# Patient Record
Sex: MALE | Race: WHITE | Employment: FULL TIME | URBAN - METROPOLITAN AREA
[De-identification: names, ages, dates, MRNs, and addresses within clinical notes are randomized per-mention and may not be internally consistent; named-entity substitution may affect disease eponyms.]

---

## 2017-01-27 RX ORDER — TRAZODONE HYDROCHLORIDE 100 MG/1
TABLET ORAL
Qty: 270 TABLET | Refills: 1 | Status: SHIPPED | OUTPATIENT
Start: 2017-01-27 | End: 2017-02-16 | Stop reason: SDUPTHER

## 2017-01-27 RX ORDER — ATORVASTATIN CALCIUM 80 MG/1
TABLET, FILM COATED ORAL
Qty: 90 TABLET | Refills: 1 | Status: SHIPPED | OUTPATIENT
Start: 2017-01-27 | End: 2017-02-16 | Stop reason: SDUPTHER

## 2017-01-27 RX ORDER — AMLODIPINE BESYLATE 10 MG/1
TABLET ORAL
Qty: 90 TABLET | Refills: 1 | Status: SHIPPED | OUTPATIENT
Start: 2017-01-27 | End: 2017-08-11 | Stop reason: SDUPTHER

## 2017-01-27 RX ORDER — VALSARTAN 320 MG/1
TABLET ORAL
Qty: 90 TABLET | Refills: 1 | Status: SHIPPED | OUTPATIENT
Start: 2017-01-27 | End: 2017-08-11 | Stop reason: SDUPTHER

## 2017-01-27 RX ORDER — POTASSIUM CHLORIDE 750 MG/1
TABLET, FILM COATED, EXTENDED RELEASE ORAL
Qty: 90 TABLET | Refills: 1 | Status: SHIPPED | OUTPATIENT
Start: 2017-01-27 | End: 2017-08-15 | Stop reason: SDUPTHER

## 2017-02-07 RX ORDER — OXYBUTYNIN CHLORIDE 10 MG/1
10 TABLET, EXTENDED RELEASE ORAL DAILY
Qty: 90 TABLET | Refills: 1 | Status: SHIPPED | OUTPATIENT
Start: 2017-02-07 | End: 2017-09-25 | Stop reason: SDUPTHER

## 2017-02-10 ENCOUNTER — TELEPHONE (OUTPATIENT)
Dept: FAMILY MEDICINE CLINIC | Age: 67
End: 2017-02-10

## 2017-02-10 RX ORDER — HYDROCHLOROTHIAZIDE 25 MG/1
25 TABLET ORAL DAILY
Qty: 30 TABLET | Refills: 0 | Status: SHIPPED | OUTPATIENT
Start: 2017-02-10 | End: 2017-03-28 | Stop reason: SDUPTHER

## 2017-02-16 ENCOUNTER — OFFICE VISIT (OUTPATIENT)
Dept: FAMILY MEDICINE CLINIC | Age: 67
End: 2017-02-16

## 2017-02-16 VITALS
OXYGEN SATURATION: 95 % | BODY MASS INDEX: 32.39 KG/M2 | WEIGHT: 266 LBS | SYSTOLIC BLOOD PRESSURE: 134 MMHG | DIASTOLIC BLOOD PRESSURE: 70 MMHG | RESPIRATION RATE: 17 BRPM | HEIGHT: 76 IN | HEART RATE: 83 BPM | TEMPERATURE: 97.7 F

## 2017-02-16 DIAGNOSIS — J40 BRONCHITIS: Primary | ICD-10-CM

## 2017-02-16 PROCEDURE — 1036F TOBACCO NON-USER: CPT | Performed by: NURSE PRACTITIONER

## 2017-02-16 PROCEDURE — G8427 DOCREV CUR MEDS BY ELIG CLIN: HCPCS | Performed by: NURSE PRACTITIONER

## 2017-02-16 PROCEDURE — G8417 CALC BMI ABV UP PARAM F/U: HCPCS | Performed by: NURSE PRACTITIONER

## 2017-02-16 PROCEDURE — 1123F ACP DISCUSS/DSCN MKR DOCD: CPT | Performed by: NURSE PRACTITIONER

## 2017-02-16 PROCEDURE — G8484 FLU IMMUNIZE NO ADMIN: HCPCS | Performed by: NURSE PRACTITIONER

## 2017-02-16 PROCEDURE — 3017F COLORECTAL CA SCREEN DOC REV: CPT | Performed by: NURSE PRACTITIONER

## 2017-02-16 PROCEDURE — 4040F PNEUMOC VAC/ADMIN/RCVD: CPT | Performed by: NURSE PRACTITIONER

## 2017-02-16 PROCEDURE — 99213 OFFICE O/P EST LOW 20 MIN: CPT | Performed by: NURSE PRACTITIONER

## 2017-02-16 RX ORDER — GUAIFENESIN AND CODEINE PHOSPHATE 100; 10 MG/5ML; MG/5ML
5-10 SOLUTION ORAL EVERY 4 HOURS PRN
Qty: 120 ML | Refills: 0 | Status: SHIPPED | OUTPATIENT
Start: 2017-02-16 | End: 2017-02-23

## 2017-02-16 RX ORDER — AZITHROMYCIN 250 MG/1
TABLET, FILM COATED ORAL
Qty: 6 TABLET | Refills: 0 | Status: SHIPPED | OUTPATIENT
Start: 2017-02-16 | End: 2017-02-26

## 2017-03-03 RX ORDER — FUROSEMIDE 40 MG/1
TABLET ORAL
Qty: 30 TABLET | Refills: 2 | Status: SHIPPED | OUTPATIENT
Start: 2017-03-03 | End: 2017-08-15 | Stop reason: SDUPTHER

## 2017-03-13 ENCOUNTER — PATIENT MESSAGE (OUTPATIENT)
Dept: FAMILY MEDICINE CLINIC | Age: 67
End: 2017-03-13

## 2017-03-28 RX ORDER — HYDROCHLOROTHIAZIDE 25 MG/1
TABLET ORAL
Qty: 30 TABLET | Refills: 0 | Status: SHIPPED | OUTPATIENT
Start: 2017-03-28 | End: 2017-04-24 | Stop reason: SDUPTHER

## 2017-04-24 RX ORDER — DULOXETIN HYDROCHLORIDE 60 MG/1
CAPSULE, DELAYED RELEASE ORAL
Qty: 30 CAPSULE | Refills: 2 | Status: SHIPPED | OUTPATIENT
Start: 2017-04-24 | End: 2017-07-18 | Stop reason: SDUPTHER

## 2017-04-24 RX ORDER — HYDROCHLOROTHIAZIDE 25 MG/1
TABLET ORAL
Qty: 30 TABLET | Refills: 0 | Status: SHIPPED | OUTPATIENT
Start: 2017-04-24 | End: 2017-05-26 | Stop reason: SDUPTHER

## 2017-04-27 RX ORDER — GABAPENTIN 800 MG/1
TABLET ORAL
Qty: 90 TABLET | Refills: 1 | Status: SHIPPED | OUTPATIENT
Start: 2017-04-27 | End: 2017-06-25 | Stop reason: SDUPTHER

## 2017-05-23 RX ORDER — FUROSEMIDE 40 MG/1
TABLET ORAL
Qty: 30 TABLET | Refills: 2 | Status: SHIPPED | OUTPATIENT
Start: 2017-05-23 | End: 2017-07-18 | Stop reason: SDUPTHER

## 2017-05-23 RX ORDER — OMEPRAZOLE 20 MG/1
CAPSULE, DELAYED RELEASE ORAL
Qty: 30 CAPSULE | Refills: 2 | Status: SHIPPED | OUTPATIENT
Start: 2017-05-23 | End: 2017-07-18 | Stop reason: SDUPTHER

## 2017-05-26 RX ORDER — HYDROCHLOROTHIAZIDE 25 MG/1
TABLET ORAL
Qty: 30 TABLET | Refills: 0 | Status: SHIPPED | OUTPATIENT
Start: 2017-05-26 | End: 2017-06-26 | Stop reason: SDUPTHER

## 2017-06-26 RX ORDER — GABAPENTIN 800 MG/1
TABLET ORAL
Qty: 90 TABLET | Refills: 1 | Status: SHIPPED | OUTPATIENT
Start: 2017-06-26 | End: 2017-10-13 | Stop reason: SDUPTHER

## 2017-07-18 RX ORDER — OMEPRAZOLE 20 MG/1
CAPSULE, DELAYED RELEASE ORAL
Qty: 30 CAPSULE | Refills: 2 | Status: SHIPPED | OUTPATIENT
Start: 2017-07-18 | End: 2017-08-15 | Stop reason: SDUPTHER

## 2017-07-18 RX ORDER — DULOXETIN HYDROCHLORIDE 60 MG/1
CAPSULE, DELAYED RELEASE ORAL
Qty: 30 CAPSULE | Refills: 2 | Status: SHIPPED | OUTPATIENT
Start: 2017-07-18 | End: 2017-08-15 | Stop reason: SDUPTHER

## 2017-07-18 RX ORDER — FUROSEMIDE 40 MG/1
TABLET ORAL
Qty: 90 TABLET | Refills: 1 | Status: CANCELLED | OUTPATIENT
Start: 2017-07-18

## 2017-07-18 RX ORDER — OMEPRAZOLE 20 MG/1
CAPSULE, DELAYED RELEASE ORAL
Qty: 90 CAPSULE | Refills: 1 | Status: CANCELLED | OUTPATIENT
Start: 2017-07-18

## 2017-07-18 RX ORDER — DULOXETIN HYDROCHLORIDE 60 MG/1
CAPSULE, DELAYED RELEASE ORAL
Qty: 90 CAPSULE | Refills: 1 | Status: CANCELLED | OUTPATIENT
Start: 2017-07-18

## 2017-07-18 RX ORDER — FUROSEMIDE 40 MG/1
40 TABLET ORAL DAILY
Qty: 30 TABLET | Refills: 2 | Status: SHIPPED | OUTPATIENT
Start: 2017-07-18 | End: 2017-08-15 | Stop reason: SDUPTHER

## 2017-08-22 RX ORDER — FINASTERIDE 5 MG/1
TABLET, FILM COATED ORAL
Qty: 30 TABLET | Refills: 8 | Status: SHIPPED | OUTPATIENT
Start: 2017-08-22 | End: 2018-05-14 | Stop reason: SDUPTHER

## 2017-09-13 RX ORDER — TAMSULOSIN HYDROCHLORIDE 0.4 MG/1
CAPSULE ORAL
Qty: 30 CAPSULE | Refills: 0 | Status: SHIPPED | OUTPATIENT
Start: 2017-09-13 | End: 2017-10-20 | Stop reason: SDUPTHER

## 2017-09-13 RX ORDER — DULOXETIN HYDROCHLORIDE 60 MG/1
CAPSULE, DELAYED RELEASE ORAL
Qty: 30 CAPSULE | Refills: 0 | Status: SHIPPED | OUTPATIENT
Start: 2017-09-13 | End: 2017-10-15 | Stop reason: SDUPTHER

## 2017-09-14 RX ORDER — AMLODIPINE BESYLATE 10 MG/1
TABLET ORAL
Qty: 30 TABLET | Refills: 0 | Status: SHIPPED | OUTPATIENT
Start: 2017-09-14 | End: 2017-09-20 | Stop reason: SDUPTHER

## 2017-09-14 RX ORDER — ATORVASTATIN CALCIUM 80 MG/1
TABLET, FILM COATED ORAL
Qty: 30 TABLET | Refills: 0 | Status: SHIPPED | OUTPATIENT
Start: 2017-09-14 | End: 2017-09-20 | Stop reason: SDUPTHER

## 2017-09-14 RX ORDER — OMEPRAZOLE 20 MG/1
CAPSULE, DELAYED RELEASE ORAL
Qty: 30 CAPSULE | Refills: 0 | Status: SHIPPED | OUTPATIENT
Start: 2017-09-14 | End: 2017-10-15 | Stop reason: SDUPTHER

## 2017-09-14 RX ORDER — POTASSIUM CHLORIDE 750 MG/1
TABLET, FILM COATED, EXTENDED RELEASE ORAL
Qty: 30 TABLET | Refills: 0 | Status: SHIPPED | OUTPATIENT
Start: 2017-09-14 | End: 2017-09-20 | Stop reason: SDUPTHER

## 2017-09-14 RX ORDER — VALSARTAN 320 MG/1
TABLET ORAL
Qty: 30 TABLET | Refills: 0 | Status: SHIPPED | OUTPATIENT
Start: 2017-09-14 | End: 2017-09-20 | Stop reason: SDUPTHER

## 2017-09-14 RX ORDER — FUROSEMIDE 40 MG/1
TABLET ORAL
Qty: 30 TABLET | Refills: 0 | Status: SHIPPED | OUTPATIENT
Start: 2017-09-14 | End: 2017-10-15 | Stop reason: SDUPTHER

## 2017-09-20 RX ORDER — ATORVASTATIN CALCIUM 80 MG/1
TABLET, FILM COATED ORAL
Qty: 30 TABLET | Refills: 0 | Status: SHIPPED | OUTPATIENT
Start: 2017-09-20 | End: 2018-10-16 | Stop reason: SDUPTHER

## 2017-09-20 RX ORDER — VALSARTAN 320 MG/1
TABLET ORAL
Qty: 30 TABLET | Refills: 0 | Status: SHIPPED | OUTPATIENT
Start: 2017-09-20 | End: 2018-08-23

## 2017-09-20 RX ORDER — POTASSIUM CHLORIDE 750 MG/1
TABLET, FILM COATED, EXTENDED RELEASE ORAL
Qty: 30 TABLET | Refills: 0 | Status: SHIPPED | OUTPATIENT
Start: 2017-09-20 | End: 2018-06-21 | Stop reason: SDUPTHER

## 2017-09-20 RX ORDER — AMLODIPINE BESYLATE 10 MG/1
TABLET ORAL
Qty: 30 TABLET | Refills: 0 | Status: SHIPPED | OUTPATIENT
Start: 2017-09-20 | End: 2017-11-22 | Stop reason: SDUPTHER

## 2017-09-25 RX ORDER — OXYBUTYNIN CHLORIDE 10 MG/1
TABLET, EXTENDED RELEASE ORAL
Qty: 90 TABLET | Refills: 0 | Status: SHIPPED | OUTPATIENT
Start: 2017-09-25 | End: 2018-01-02 | Stop reason: SDUPTHER

## 2017-10-12 ENCOUNTER — OFFICE VISIT (OUTPATIENT)
Dept: FAMILY MEDICINE CLINIC | Age: 67
End: 2017-10-12

## 2017-10-12 VITALS
BODY MASS INDEX: 32.76 KG/M2 | RESPIRATION RATE: 16 BRPM | DIASTOLIC BLOOD PRESSURE: 72 MMHG | SYSTOLIC BLOOD PRESSURE: 120 MMHG | HEIGHT: 76 IN | HEART RATE: 78 BPM | WEIGHT: 269 LBS

## 2017-10-12 DIAGNOSIS — I10 ESSENTIAL HYPERTENSION: Primary | ICD-10-CM

## 2017-10-12 DIAGNOSIS — F33.42 RECURRENT MAJOR DEPRESSIVE EPISODES, IN FULL REMISSION (HCC): ICD-10-CM

## 2017-10-12 DIAGNOSIS — E55.9 VITAMIN D DEFICIENCY: ICD-10-CM

## 2017-10-12 DIAGNOSIS — E78.00 HYPERCHOLESTEREMIA: ICD-10-CM

## 2017-10-12 DIAGNOSIS — E11.40 PAINFUL DIABETIC NEUROPATHY (HCC): ICD-10-CM

## 2017-10-12 DIAGNOSIS — K59.04 CHRONIC IDIOPATHIC CONSTIPATION: ICD-10-CM

## 2017-10-12 DIAGNOSIS — Z13.6 SCREENING FOR AAA (ABDOMINAL AORTIC ANEURYSM): ICD-10-CM

## 2017-10-12 DIAGNOSIS — N40.1 BENIGN PROSTATIC HYPERPLASIA WITH LOWER URINARY TRACT SYMPTOMS, UNSPECIFIED MORPHOLOGY: ICD-10-CM

## 2017-10-12 DIAGNOSIS — F51.01 PRIMARY INSOMNIA: ICD-10-CM

## 2017-10-12 PROCEDURE — 99214 OFFICE O/P EST MOD 30 MIN: CPT | Performed by: FAMILY MEDICINE

## 2017-10-12 PROCEDURE — G8484 FLU IMMUNIZE NO ADMIN: HCPCS | Performed by: FAMILY MEDICINE

## 2017-10-12 PROCEDURE — G8427 DOCREV CUR MEDS BY ELIG CLIN: HCPCS | Performed by: FAMILY MEDICINE

## 2017-10-12 PROCEDURE — 1123F ACP DISCUSS/DSCN MKR DOCD: CPT | Performed by: FAMILY MEDICINE

## 2017-10-12 PROCEDURE — 3046F HEMOGLOBIN A1C LEVEL >9.0%: CPT | Performed by: FAMILY MEDICINE

## 2017-10-12 PROCEDURE — G8510 SCR DEP NEG, NO PLAN REQD: HCPCS | Performed by: FAMILY MEDICINE

## 2017-10-12 PROCEDURE — 1036F TOBACCO NON-USER: CPT | Performed by: FAMILY MEDICINE

## 2017-10-12 PROCEDURE — 3288F FALL RISK ASSESSMENT DOCD: CPT | Performed by: FAMILY MEDICINE

## 2017-10-12 PROCEDURE — 3017F COLORECTAL CA SCREEN DOC REV: CPT | Performed by: FAMILY MEDICINE

## 2017-10-12 PROCEDURE — 4040F PNEUMOC VAC/ADMIN/RCVD: CPT | Performed by: FAMILY MEDICINE

## 2017-10-12 PROCEDURE — G8417 CALC BMI ABV UP PARAM F/U: HCPCS | Performed by: FAMILY MEDICINE

## 2017-10-12 RX ORDER — NEBIVOLOL 10 MG/1
10 TABLET ORAL DAILY
Qty: 30 TABLET | Refills: 3 | Status: SHIPPED | OUTPATIENT
Start: 2017-10-12 | End: 2018-01-16

## 2017-10-12 ASSESSMENT — PATIENT HEALTH QUESTIONNAIRE - PHQ9
SUM OF ALL RESPONSES TO PHQ QUESTIONS 1-9: 0
2. FEELING DOWN, DEPRESSED OR HOPELESS: 0
1. LITTLE INTEREST OR PLEASURE IN DOING THINGS: 0
SUM OF ALL RESPONSES TO PHQ9 QUESTIONS 1 & 2: 0

## 2017-10-12 NOTE — PROGRESS NOTES
Subjective:      Patient ID: Shivam Agosto is a 75 y.o. male. HPI  Chief Complaint   Patient presents with    Hypertension     HTN ROUTINE FOLLOW UP [facility] GOAL NEEDED FASTING LABS ORDERED FOR FUTURE WOULD LIKE TO DISCUSS TEST BECUASE HE WAS A FORMER SMOKE WANTS TO DISCUSS CHANGING AMLODIPINE     bp up off norvasc - 160'P systolic - went back on norvasc  BP Readings from Last 3 Encounters:   10/12/17 120/72   02/16/17 134/70   11/15/16 132/72     Pulse Readings from Last 3 Encounters:   10/12/17 78   02/16/17 83   11/15/16 94     Wt Readings from Last 3 Encounters:   10/12/17 269 lb (122 kg)   02/16/17 266 lb (120.7 kg)   11/15/16 266 lb (120.7 kg)   some constipation issues - takes occ stool softener - seems to work ok. Tries to get fiber / fluids  Current Outpatient Prescriptions   Medication Sig Dispense Refill    tiZANidine (ZANAFLEX) 4 MG tablet TAKE 2 TABLETS BY MOUTH EVERY DAY IN THE EVENING 60 tablet 1    oxybutynin (DITROPAN-XL) 10 MG extended release tablet TAKE 1 TABLET BY MOUTH DAILY 90 tablet 0    KLOR-CON 10 10 MEQ extended release tablet TAKE 1 TABLET BY MOUTH EVERY DAY 30 tablet 0    amLODIPine (NORVASC) 10 MG tablet TAKE 1 TABLET BY MOUTH EVERY DAY 30 tablet 0    atorvastatin (LIPITOR) 80 MG tablet TAKE 1 TABLET BY MOUTH EVERY DAY 30 tablet 0    valsartan (DIOVAN) 320 MG tablet TAKE 1 TABLET BY MOUTH EVERY DAY 30 tablet 0    omeprazole (PRILOSEC) 20 MG delayed release capsule TAKE ONE CAPSULE BY MOUTH EVERY DAY 30 capsule 0    furosemide (LASIX) 40 MG tablet TAKE 1 TABLET BY MOUTH DAILY APPT REQUIRED PRIOR TO ADDITIONAL REFILLS 30 tablet 0    DULoxetine (CYMBALTA) 60 MG extended release capsule TAKE 1 CAPSULE BY MOUTH DAILY 30 capsule 0    tamsulosin (FLOMAX) 0.4 MG capsule TAKE 1 CAPSULE BY MOUTH DAILY. 30 capsule 0    traZODone (DESYREL) 100 MG tablet TAKE 2 TO 3 TABLETS BY MOUTH NIGHTLY.  270 tablet 0    hydrochlorothiazide (HYDRODIURIL) 25 MG tablet TAKE 1 TABLET BY MOUTH DAILY 90 tablet 0    finasteride (PROSCAR) 5 MG tablet TAKE 1 TABLET BY MOUTH EVERY DAY 30 tablet 8    gabapentin (NEURONTIN) 800 MG tablet TAKE 1 TABLET BY MOUTH 3 TIMES A DAY 90 tablet 1    traZODone (DESYREL) 100 MG tablet TAKE 2-3 TABLETS BY MOUTH AT BEDTIME 90 tablet 1    chlorthalidone (HYGROTON) 25 MG tablet TAKE 1 TABLET BY MOUTH DAILY. 30 tablet 11    ibuprofen (ADVIL;MOTRIN) 200 MG tablet Take 200 mg by mouth every 6 hours as needed for Pain.  Pseudoephedrine-Guaifenesin (MUCINEX D PO) Take 1 tablet by mouth daily.  vitamin D-3 (CHOLECALCIFEROL) 5000 UNITS TABS Take 5,000 Units by mouth daily. Current Facility-Administered Medications   Medication Dose Route Frequency Provider Last Rate Last Dose    bupivacaine (MARCAINE) 0.5 % injection 7.5 mg  1.5 mL Intra-articular Once Arley Sans, CNP        lidocaine 1 % injection 1 mL  1 mL Intra-articular Once Arley Sans, CNP        methylPREDNISolone acetate (DEPO-MEDROL) injection 40 mg  40 mg Intra-articular Once Arley Sans, CNP       no ha/ dizzy  Trying to watch diet a little more  Not sleeping great - pills help get to sleep but up in 3-4 hours. Able to sleep again  Getting thompson test in near future  bph fairly good on flomax  Some apneic episodes  No cp/palp/sob  Review of Systems  Quit smoking 1973 -smoked about 7-8 years  Objective:   Physical Exam   Constitutional: He appears well-developed. No distress. HENT:   Mouth/Throat: Oropharynx is clear and moist.   Eyes: Conjunctivae are normal. No scleral icterus. Cardiovascular: Normal rate, regular rhythm, normal heart sounds and intact distal pulses. Exam reveals no gallop. No murmur heard. Pulmonary/Chest: Effort normal and breath sounds normal. No respiratory distress. He has no wheezes. He has no rhonchi. He has no rales. Abdominal: Soft. Bowel sounds are normal. He exhibits no distension. There is no tenderness. Musculoskeletal: He exhibits no edema.

## 2017-10-13 ENCOUNTER — NURSE ONLY (OUTPATIENT)
Dept: FAMILY MEDICINE CLINIC | Age: 67
End: 2017-10-13

## 2017-10-13 DIAGNOSIS — E55.9 VITAMIN D DEFICIENCY: ICD-10-CM

## 2017-10-13 DIAGNOSIS — E78.00 HYPERCHOLESTEREMIA: ICD-10-CM

## 2017-10-13 DIAGNOSIS — I10 ESSENTIAL HYPERTENSION: ICD-10-CM

## 2017-10-13 DIAGNOSIS — N40.1 BENIGN PROSTATIC HYPERPLASIA WITH LOWER URINARY TRACT SYMPTOMS, UNSPECIFIED MORPHOLOGY: ICD-10-CM

## 2017-10-13 LAB
A/G RATIO: 2.2 (ref 1.1–2.2)
ALBUMIN SERPL-MCNC: 4.4 G/DL (ref 3.4–5)
ALP BLD-CCNC: 78 U/L (ref 40–129)
ALT SERPL-CCNC: 22 U/L (ref 10–40)
ANION GAP SERPL CALCULATED.3IONS-SCNC: 15 MMOL/L (ref 3–16)
AST SERPL-CCNC: 16 U/L (ref 15–37)
BILIRUB SERPL-MCNC: 0.6 MG/DL (ref 0–1)
BUN BLDV-MCNC: 21 MG/DL (ref 7–20)
CALCIUM SERPL-MCNC: 9.2 MG/DL (ref 8.3–10.6)
CHLORIDE BLD-SCNC: 100 MMOL/L (ref 99–110)
CHOLESTEROL, TOTAL: 184 MG/DL (ref 0–199)
CO2: 26 MMOL/L (ref 21–32)
CREAT SERPL-MCNC: 0.8 MG/DL (ref 0.8–1.3)
GFR AFRICAN AMERICAN: >60
GFR NON-AFRICAN AMERICAN: >60
GLOBULIN: 2 G/DL
GLUCOSE BLD-MCNC: 108 MG/DL (ref 70–99)
HDLC SERPL-MCNC: 56 MG/DL (ref 40–60)
LDL CHOLESTEROL CALCULATED: 105 MG/DL
POTASSIUM SERPL-SCNC: 3.9 MMOL/L (ref 3.5–5.1)
PROSTATE SPECIFIC ANTIGEN: 1.48 NG/ML (ref 0–4)
SODIUM BLD-SCNC: 141 MMOL/L (ref 136–145)
TOTAL PROTEIN: 6.4 G/DL (ref 6.4–8.2)
TRIGL SERPL-MCNC: 115 MG/DL (ref 0–150)
VITAMIN D 25-HYDROXY: 58.3 NG/ML
VLDLC SERPL CALC-MCNC: 23 MG/DL

## 2017-10-13 PROCEDURE — 36415 COLL VENOUS BLD VENIPUNCTURE: CPT | Performed by: FAMILY MEDICINE

## 2017-10-16 RX ORDER — FUROSEMIDE 40 MG/1
TABLET ORAL
Qty: 30 TABLET | Refills: 0 | Status: SHIPPED | OUTPATIENT
Start: 2017-10-16 | End: 2017-11-22 | Stop reason: SDUPTHER

## 2017-10-16 RX ORDER — DULOXETIN HYDROCHLORIDE 60 MG/1
CAPSULE, DELAYED RELEASE ORAL
Qty: 30 CAPSULE | Refills: 0 | Status: SHIPPED | OUTPATIENT
Start: 2017-10-16 | End: 2017-11-22 | Stop reason: SDUPTHER

## 2017-10-16 RX ORDER — OMEPRAZOLE 20 MG/1
CAPSULE, DELAYED RELEASE ORAL
Qty: 30 CAPSULE | Refills: 0 | Status: SHIPPED | OUTPATIENT
Start: 2017-10-16 | End: 2017-11-22 | Stop reason: SDUPTHER

## 2017-10-23 RX ORDER — TAMSULOSIN HYDROCHLORIDE 0.4 MG/1
CAPSULE ORAL
Qty: 30 CAPSULE | Refills: 0 | Status: SHIPPED | OUTPATIENT
Start: 2017-10-23 | End: 2017-11-22 | Stop reason: SDUPTHER

## 2017-10-24 RX ORDER — HYDROCHLOROTHIAZIDE 25 MG/1
TABLET ORAL
Qty: 90 TABLET | Refills: 1 | Status: SHIPPED | OUTPATIENT
Start: 2017-10-24 | End: 2018-04-02

## 2017-11-22 RX ORDER — FUROSEMIDE 40 MG/1
TABLET ORAL
Qty: 30 TABLET | Refills: 0 | Status: SHIPPED | OUTPATIENT
Start: 2017-11-22 | End: 2017-12-18 | Stop reason: SDUPTHER

## 2017-11-22 RX ORDER — TIZANIDINE 4 MG/1
TABLET ORAL
Qty: 180 TABLET | Refills: 0 | Status: SHIPPED | OUTPATIENT
Start: 2017-11-22 | End: 2018-03-03 | Stop reason: SDUPTHER

## 2017-11-22 RX ORDER — OMEPRAZOLE 20 MG/1
CAPSULE, DELAYED RELEASE ORAL
Qty: 30 CAPSULE | Refills: 0 | Status: SHIPPED | OUTPATIENT
Start: 2017-11-22 | End: 2017-12-18 | Stop reason: SDUPTHER

## 2017-11-22 RX ORDER — AMLODIPINE BESYLATE 10 MG/1
TABLET ORAL
Qty: 90 TABLET | Refills: 1 | Status: SHIPPED | OUTPATIENT
Start: 2017-11-22 | End: 2018-04-16 | Stop reason: CLARIF

## 2017-11-22 RX ORDER — TAMSULOSIN HYDROCHLORIDE 0.4 MG/1
CAPSULE ORAL
Qty: 30 CAPSULE | Refills: 0 | Status: SHIPPED | OUTPATIENT
Start: 2017-11-22 | End: 2017-12-18 | Stop reason: SDUPTHER

## 2017-11-22 RX ORDER — DULOXETIN HYDROCHLORIDE 60 MG/1
CAPSULE, DELAYED RELEASE ORAL
Qty: 30 CAPSULE | Refills: 0 | Status: SHIPPED | OUTPATIENT
Start: 2017-11-22 | End: 2017-12-18 | Stop reason: SDUPTHER

## 2017-11-22 RX ORDER — AMLODIPINE BESYLATE 10 MG/1
TABLET ORAL
Qty: 90 TABLET | Refills: 0 | Status: CANCELLED | OUTPATIENT
Start: 2017-11-22

## 2017-11-24 RX ORDER — TRAZODONE HYDROCHLORIDE 100 MG/1
TABLET ORAL
Qty: 270 TABLET | Refills: 0 | Status: SHIPPED | OUTPATIENT
Start: 2017-11-24 | End: 2018-03-03 | Stop reason: SDUPTHER

## 2017-12-08 ENCOUNTER — HOSPITAL ENCOUNTER (OUTPATIENT)
Dept: ULTRASOUND IMAGING | Age: 67
Discharge: HOME | End: 2017-12-08
Attending: FAMILY MEDICINE | Admitting: FAMILY MEDICINE

## 2017-12-08 DIAGNOSIS — Z13.6 ENCOUNTER FOR SCREENING FOR CARDIOVASCULAR DISORDERS: ICD-10-CM

## 2017-12-08 DIAGNOSIS — Z13.6 SCREENING FOR AAA (ABDOMINAL AORTIC ANEURYSM): ICD-10-CM

## 2017-12-14 ENCOUNTER — TELEPHONE (OUTPATIENT)
Dept: FAMILY MEDICINE CLINIC | Age: 67
End: 2017-12-14

## 2017-12-14 ENCOUNTER — PATIENT MESSAGE (OUTPATIENT)
Dept: FAMILY MEDICINE CLINIC | Age: 67
End: 2017-12-14

## 2017-12-14 DIAGNOSIS — K76.89 LIVER CYST: Primary | ICD-10-CM

## 2017-12-14 NOTE — TELEPHONE ENCOUNTER
From: Jonna Agosto  To: Analia Jacques MD  Sent: 12/14/2017 8:58 AM EST  Subject: Test Results Question    I had the AAA scan at [facility] on Friday December 8th and wanted to know if you have received the results     Thank you,      Vick Agosto

## 2017-12-18 NOTE — TELEPHONE ENCOUNTER
No evidence of abdominal aortic aneurysm.       Minimally complicated 9 cm hepatic cyst, likely benign.  Six-month   sonographic follow-up to document stability. SPOKE TO PT AND SCHEDULED AN APPT FOR 1/30/2018.  [facility]

## 2017-12-19 RX ORDER — TAMSULOSIN HYDROCHLORIDE 0.4 MG/1
CAPSULE ORAL
Qty: 30 CAPSULE | Refills: 2 | Status: SHIPPED | OUTPATIENT
Start: 2017-12-19 | End: 2017-12-22 | Stop reason: SDUPTHER

## 2017-12-19 RX ORDER — FUROSEMIDE 40 MG/1
TABLET ORAL
Qty: 30 TABLET | Refills: 2 | Status: SHIPPED | OUTPATIENT
Start: 2017-12-19 | End: 2017-12-22 | Stop reason: SDUPTHER

## 2017-12-19 RX ORDER — OMEPRAZOLE 20 MG/1
CAPSULE, DELAYED RELEASE ORAL
Qty: 30 CAPSULE | Refills: 2 | Status: SHIPPED | OUTPATIENT
Start: 2017-12-19 | End: 2017-12-22 | Stop reason: SDUPTHER

## 2017-12-19 RX ORDER — DULOXETIN HYDROCHLORIDE 60 MG/1
CAPSULE, DELAYED RELEASE ORAL
Qty: 30 CAPSULE | Refills: 2 | Status: SHIPPED | OUTPATIENT
Start: 2017-12-19 | End: 2017-12-22 | Stop reason: SDUPTHER

## 2017-12-20 DIAGNOSIS — K76.89 HEPATIC CYST: Primary | ICD-10-CM

## 2017-12-26 RX ORDER — DULOXETIN HYDROCHLORIDE 60 MG/1
CAPSULE, DELAYED RELEASE ORAL
Qty: 90 CAPSULE | Refills: 0 | Status: SHIPPED | OUTPATIENT
Start: 2017-12-26 | End: 2018-04-11 | Stop reason: SDUPTHER

## 2017-12-26 RX ORDER — OMEPRAZOLE 20 MG/1
CAPSULE, DELAYED RELEASE ORAL
Qty: 90 CAPSULE | Refills: 0 | Status: SHIPPED | OUTPATIENT
Start: 2017-12-26 | End: 2018-04-11 | Stop reason: SDUPTHER

## 2017-12-26 RX ORDER — FUROSEMIDE 40 MG/1
TABLET ORAL
Qty: 90 TABLET | Refills: 0 | Status: SHIPPED | OUTPATIENT
Start: 2017-12-26 | End: 2018-04-24 | Stop reason: SDUPTHER

## 2017-12-26 RX ORDER — TAMSULOSIN HYDROCHLORIDE 0.4 MG/1
CAPSULE ORAL
Qty: 90 CAPSULE | Refills: 0 | Status: SHIPPED | OUTPATIENT
Start: 2017-12-26 | End: 2018-01-02 | Stop reason: SDUPTHER

## 2018-01-02 RX ORDER — OXYBUTYNIN CHLORIDE 10 MG/1
TABLET, EXTENDED RELEASE ORAL
Qty: 90 TABLET | Refills: 0 | Status: CANCELLED | OUTPATIENT
Start: 2018-01-02

## 2018-01-02 RX ORDER — TAMSULOSIN HYDROCHLORIDE 0.4 MG/1
CAPSULE ORAL
Qty: 90 CAPSULE | Refills: 0 | Status: CANCELLED | OUTPATIENT
Start: 2018-01-02

## 2018-01-16 RX ORDER — METOPROLOL SUCCINATE 50 MG/1
50 TABLET, EXTENDED RELEASE ORAL DAILY
Qty: 30 TABLET | Refills: 0 | Status: SHIPPED | OUTPATIENT
Start: 2018-01-16 | End: 2018-02-07 | Stop reason: SDUPTHER

## 2018-01-17 ENCOUNTER — TELEPHONE (OUTPATIENT)
Dept: FAMILY MEDICINE CLINIC | Age: 68
End: 2018-01-17

## 2018-01-17 NOTE — TELEPHONE ENCOUNTER
CALLING PT IN REGARDS TO LETTER HE LEFT. SASCHA CHANGED HIS BYSTOLIC TO TOPROL XL 22GX TO START AFTER HE RUNS OUT OF THE BYSTOLIC AND WANTS HIM TO KEEP HIS APT ON 2/7/18 AND TO CHECK HIS BP 1-2 X WKLY AND BRING RECORD TO HIS VISIT.

## 2018-01-23 ENCOUNTER — TELEPHONE (OUTPATIENT)
Dept: FAMILY MEDICINE CLINIC | Age: 68
End: 2018-01-23

## 2018-02-07 ENCOUNTER — OFFICE VISIT (OUTPATIENT)
Dept: FAMILY MEDICINE CLINIC | Age: 68
End: 2018-02-07

## 2018-02-07 VITALS
RESPIRATION RATE: 16 BRPM | HEART RATE: 82 BPM | WEIGHT: 270 LBS | BODY MASS INDEX: 32.88 KG/M2 | HEIGHT: 76 IN | DIASTOLIC BLOOD PRESSURE: 80 MMHG | SYSTOLIC BLOOD PRESSURE: 138 MMHG

## 2018-02-07 DIAGNOSIS — R07.9 CHEST PAIN, UNSPECIFIED TYPE: Primary | ICD-10-CM

## 2018-02-07 DIAGNOSIS — K76.89 HEPATIC CYST: ICD-10-CM

## 2018-02-07 DIAGNOSIS — F33.42 RECURRENT MAJOR DEPRESSIVE EPISODES, IN FULL REMISSION (HCC): ICD-10-CM

## 2018-02-07 DIAGNOSIS — I10 ESSENTIAL HYPERTENSION: ICD-10-CM

## 2018-02-07 PROCEDURE — G8484 FLU IMMUNIZE NO ADMIN: HCPCS | Performed by: FAMILY MEDICINE

## 2018-02-07 PROCEDURE — 1036F TOBACCO NON-USER: CPT | Performed by: FAMILY MEDICINE

## 2018-02-07 PROCEDURE — 1123F ACP DISCUSS/DSCN MKR DOCD: CPT | Performed by: FAMILY MEDICINE

## 2018-02-07 PROCEDURE — G8417 CALC BMI ABV UP PARAM F/U: HCPCS | Performed by: FAMILY MEDICINE

## 2018-02-07 PROCEDURE — 4040F PNEUMOC VAC/ADMIN/RCVD: CPT | Performed by: FAMILY MEDICINE

## 2018-02-07 PROCEDURE — 99214 OFFICE O/P EST MOD 30 MIN: CPT | Performed by: FAMILY MEDICINE

## 2018-02-07 PROCEDURE — G8427 DOCREV CUR MEDS BY ELIG CLIN: HCPCS | Performed by: FAMILY MEDICINE

## 2018-02-07 PROCEDURE — 3017F COLORECTAL CA SCREEN DOC REV: CPT | Performed by: FAMILY MEDICINE

## 2018-02-07 PROCEDURE — 93000 ELECTROCARDIOGRAM COMPLETE: CPT | Performed by: FAMILY MEDICINE

## 2018-02-07 RX ORDER — METOPROLOL SUCCINATE 100 MG/1
100 TABLET, EXTENDED RELEASE ORAL DAILY
Qty: 90 TABLET | Refills: 1 | Status: SHIPPED | OUTPATIENT
Start: 2018-02-07 | End: 2018-08-04 | Stop reason: SDUPTHER

## 2018-02-07 NOTE — PROGRESS NOTES
Subjective:      Patient ID: Kirill Agosto is a 75 y.o. male. HPI  Fever/ chills/ malaise/ aches over weekend - no nasal / resp sx or st over weekend - several days ago - feels ok now. Temp actually low  bp running higher lately - as high as 160/90 one day - usually 130's to 140's  Had been 120's for long time  Changed meds  No sx w/ high bp   No ha/ dizzy/ cp/ palpitations - occ tightness in chest brief - nonexertional - been that way for awhile. Stress test many years ago - mom w/ cad/ high cholesterol  Running around -moving around - not going to gym  Gets in 10k steps - lifts light weights. Stomach ok - had test for aaa  No abd pain/ nausea/ abd distention  Not using ibuprofen/ alleve. No change in diet/ salt/ caffeine  Chief Complaint   Patient presents with    Hypertension     HTN ROUTINE FOLLOW UP GOAL TODAY      BP Readings from Last 3 Encounters:   02/07/18 138/80   10/12/17 120/72   02/16/17 134/70     Pulse Readings from Last 3 Encounters:   02/07/18 82   10/12/17 78   02/16/17 83     Wt Readings from Last 3 Encounters:   02/07/18 270 lb (122.5 kg)   10/12/17 269 lb (122 kg)   02/16/17 266 lb (120.7 kg)         Review of Systems    Objective:   Physical Exam   Constitutional: He appears well-developed. No distress. HENT:   Mouth/Throat: Oropharynx is clear and moist.   Eyes: Conjunctivae are normal. No scleral icterus. Cardiovascular: Normal rate, regular rhythm, normal heart sounds and intact distal pulses. Exam reveals no gallop. No murmur heard. Pulmonary/Chest: Effort normal and breath sounds normal. No respiratory distress. He has no wheezes. He has no rhonchi. He has no rales. Abdominal: Soft. Bowel sounds are normal. He exhibits distension (mild). There is no tenderness. Musculoskeletal: He exhibits no edema. Neurological: He is alert. Skin: Skin is intact. No rash noted. No erythema. Psychiatric: He has a normal mood and affect. Assessment:      1.  Chest pain,

## 2018-03-05 ENCOUNTER — TELEPHONE (OUTPATIENT)
Dept: FAMILY MEDICINE CLINIC | Age: 68
End: 2018-03-05

## 2018-03-05 DIAGNOSIS — R07.9 CHEST PAIN, UNSPECIFIED TYPE: Primary | ICD-10-CM

## 2018-03-06 RX ORDER — TIZANIDINE 4 MG/1
TABLET ORAL
Qty: 180 TABLET | Refills: 0 | Status: SHIPPED | OUTPATIENT
Start: 2018-03-06 | End: 2018-05-18 | Stop reason: SDUPTHER

## 2018-03-06 RX ORDER — TRAZODONE HYDROCHLORIDE 100 MG/1
TABLET ORAL
Qty: 270 TABLET | Refills: 0 | Status: SHIPPED | OUTPATIENT
Start: 2018-03-06 | End: 2018-05-18 | Stop reason: SDUPTHER

## 2018-03-08 RX ORDER — ATORVASTATIN CALCIUM 80 MG/1
TABLET, FILM COATED ORAL
Qty: 30 TABLET | Refills: 6 | Status: SHIPPED | OUTPATIENT
Start: 2018-03-08 | End: 2018-04-02

## 2018-03-08 RX ORDER — VALSARTAN 320 MG/1
TABLET ORAL
Qty: 30 TABLET | Refills: 6 | Status: SHIPPED | OUTPATIENT
Start: 2018-03-08 | End: 2018-04-02

## 2018-03-27 ENCOUNTER — PATIENT MESSAGE (OUTPATIENT)
Dept: FAMILY MEDICINE CLINIC | Age: 68
End: 2018-03-27

## 2018-04-02 ENCOUNTER — PAT TELEPHONE (OUTPATIENT)
Dept: PREADMISSION TESTING | Age: 68
End: 2018-04-02

## 2018-04-02 VITALS — HEIGHT: 76 IN | BODY MASS INDEX: 32.27 KG/M2 | WEIGHT: 265 LBS

## 2018-04-10 ENCOUNTER — HOSPITAL ENCOUNTER (OUTPATIENT)
Dept: ENDOSCOPY | Age: 68
Discharge: HOME | End: 2018-04-10
Attending: INTERNAL MEDICINE | Admitting: INTERNAL MEDICINE

## 2018-04-10 VITALS
HEART RATE: 56 BPM | RESPIRATION RATE: 18 BRPM | BODY MASS INDEX: 32.39 KG/M2 | WEIGHT: 266.13 LBS | SYSTOLIC BLOOD PRESSURE: 106 MMHG | TEMPERATURE: 97.9 F | OXYGEN SATURATION: 95 % | DIASTOLIC BLOOD PRESSURE: 60 MMHG

## 2018-04-10 DIAGNOSIS — Z86.010 HISTORY OF COLONIC POLYPS: ICD-10-CM

## 2018-04-10 RX ORDER — SODIUM CHLORIDE 9 MG/ML
INJECTION, SOLUTION INTRAVENOUS CONTINUOUS
Status: DISCONTINUED | OUTPATIENT
Start: 2018-04-10 | End: 2018-04-11 | Stop reason: HOSPADM

## 2018-04-10 RX ORDER — SODIUM CHLORIDE 0.9 % (FLUSH) 0.9 %
10 SYRINGE (ML) INJECTION PRN
Status: DISCONTINUED | OUTPATIENT
Start: 2018-04-10 | End: 2018-04-11 | Stop reason: HOSPADM

## 2018-04-10 RX ORDER — SODIUM CHLORIDE 0.9 % (FLUSH) 0.9 %
10 SYRINGE (ML) INJECTION EVERY 12 HOURS SCHEDULED
Status: DISCONTINUED | OUTPATIENT
Start: 2018-04-10 | End: 2018-04-11 | Stop reason: HOSPADM

## 2018-04-10 RX ORDER — ONDANSETRON 2 MG/ML
4 INJECTION INTRAMUSCULAR; INTRAVENOUS
Status: ACTIVE | OUTPATIENT
Start: 2018-04-10 | End: 2018-04-10

## 2018-04-10 RX ADMIN — SODIUM CHLORIDE: 9 INJECTION, SOLUTION INTRAVENOUS at 08:43

## 2018-04-10 ASSESSMENT — ENCOUNTER SYMPTOMS: SHORTNESS OF BREATH: 0

## 2018-04-10 ASSESSMENT — PAIN SCALES - GENERAL
PAINLEVEL_OUTOF10: 0

## 2018-04-10 ASSESSMENT — LIFESTYLE VARIABLES: SMOKING_STATUS: 0

## 2018-04-10 ASSESSMENT — PAIN - FUNCTIONAL ASSESSMENT: PAIN_FUNCTIONAL_ASSESSMENT: 0-10

## 2018-04-12 ENCOUNTER — TELEPHONE (OUTPATIENT)
Dept: FAMILY MEDICINE CLINIC | Age: 68
End: 2018-04-12

## 2018-04-12 DIAGNOSIS — R73.9 HYPERGLYCEMIA: ICD-10-CM

## 2018-04-12 DIAGNOSIS — I10 ESSENTIAL HYPERTENSION: Primary | ICD-10-CM

## 2018-04-12 DIAGNOSIS — Z11.59 ENCOUNTER FOR HEPATITIS C SCREENING TEST FOR LOW RISK PATIENT: ICD-10-CM

## 2018-04-13 ENCOUNTER — NURSE ONLY (OUTPATIENT)
Dept: FAMILY MEDICINE CLINIC | Age: 68
End: 2018-04-13

## 2018-04-13 DIAGNOSIS — Z11.59 ENCOUNTER FOR HEPATITIS C SCREENING TEST FOR LOW RISK PATIENT: ICD-10-CM

## 2018-04-13 DIAGNOSIS — R73.9 HYPERGLYCEMIA: ICD-10-CM

## 2018-04-13 DIAGNOSIS — I10 ESSENTIAL HYPERTENSION: ICD-10-CM

## 2018-04-13 LAB
ANION GAP SERPL CALCULATED.3IONS-SCNC: 13 MMOL/L (ref 3–16)
BUN BLDV-MCNC: 16 MG/DL (ref 7–20)
CALCIUM SERPL-MCNC: 8.7 MG/DL (ref 8.3–10.6)
CHLORIDE BLD-SCNC: 98 MMOL/L (ref 99–110)
CO2: 30 MMOL/L (ref 21–32)
CREAT SERPL-MCNC: 0.9 MG/DL (ref 0.8–1.3)
GFR AFRICAN AMERICAN: >60
GFR NON-AFRICAN AMERICAN: >60
GLUCOSE BLD-MCNC: 114 MG/DL (ref 70–99)
HEPATITIS C ANTIBODY INTERPRETATION: NORMAL
POTASSIUM SERPL-SCNC: 3.6 MMOL/L (ref 3.5–5.1)
SODIUM BLD-SCNC: 141 MMOL/L (ref 136–145)

## 2018-04-13 PROCEDURE — 36415 COLL VENOUS BLD VENIPUNCTURE: CPT | Performed by: FAMILY MEDICINE

## 2018-04-14 LAB
ESTIMATED AVERAGE GLUCOSE: 114 MG/DL
HBA1C MFR BLD: 5.6 %

## 2018-04-16 ENCOUNTER — OFFICE VISIT (OUTPATIENT)
Dept: SLEEP MEDICINE | Age: 68
End: 2018-04-16

## 2018-04-16 VITALS
OXYGEN SATURATION: 95 % | RESPIRATION RATE: 20 BRPM | TEMPERATURE: 97.5 F | HEIGHT: 76 IN | BODY MASS INDEX: 33.29 KG/M2 | HEART RATE: 67 BPM | WEIGHT: 273.4 LBS | DIASTOLIC BLOOD PRESSURE: 72 MMHG | SYSTOLIC BLOOD PRESSURE: 128 MMHG

## 2018-04-16 DIAGNOSIS — G47.33 OSA (OBSTRUCTIVE SLEEP APNEA): Primary | ICD-10-CM

## 2018-04-16 PROCEDURE — G8427 DOCREV CUR MEDS BY ELIG CLIN: HCPCS | Performed by: PSYCHIATRY & NEUROLOGY

## 2018-04-16 PROCEDURE — 1123F ACP DISCUSS/DSCN MKR DOCD: CPT | Performed by: PSYCHIATRY & NEUROLOGY

## 2018-04-16 PROCEDURE — 1036F TOBACCO NON-USER: CPT | Performed by: PSYCHIATRY & NEUROLOGY

## 2018-04-16 PROCEDURE — G8417 CALC BMI ABV UP PARAM F/U: HCPCS | Performed by: PSYCHIATRY & NEUROLOGY

## 2018-04-16 PROCEDURE — 4040F PNEUMOC VAC/ADMIN/RCVD: CPT | Performed by: PSYCHIATRY & NEUROLOGY

## 2018-04-16 PROCEDURE — 3017F COLORECTAL CA SCREEN DOC REV: CPT | Performed by: PSYCHIATRY & NEUROLOGY

## 2018-04-16 PROCEDURE — 99204 OFFICE O/P NEW MOD 45 MIN: CPT | Performed by: PSYCHIATRY & NEUROLOGY

## 2018-04-16 ASSESSMENT — ENCOUNTER SYMPTOMS
SHORTNESS OF BREATH: 1
CHOKING: 0
APNEA: 0
GASTROINTESTINAL NEGATIVE: 1
EYES NEGATIVE: 1
ALLERGIC/IMMUNOLOGIC NEGATIVE: 1

## 2018-04-16 ASSESSMENT — SLEEP AND FATIGUE QUESTIONNAIRES
HOW LIKELY ARE YOU TO NOD OFF OR FALL ASLEEP WHILE SITTING QUIETLY AFTER LUNCH WITHOUT ALCOHOL: 1
HOW LIKELY ARE YOU TO NOD OFF OR FALL ASLEEP WHILE LYING DOWN TO REST IN THE AFTERNOON WHEN CIRCUMSTANCES PERMIT: 1
ESS TOTAL SCORE: 10
HOW LIKELY ARE YOU TO NOD OFF OR FALL ASLEEP WHEN YOU ARE A PASSENGER IN A CAR FOR AN HOUR WITHOUT A BREAK: 1
HOW LIKELY ARE YOU TO NOD OFF OR FALL ASLEEP WHILE SITTING AND TALKING TO SOMEONE: 1
HOW LIKELY ARE YOU TO NOD OFF OR FALL ASLEEP IN A CAR, WHILE STOPPED FOR A FEW MINUTES IN TRAFFIC: 1
NECK CIRCUMFERENCE (INCHES): 19.75
HOW LIKELY ARE YOU TO NOD OFF OR FALL ASLEEP WHILE WATCHING TV: 2
HOW LIKELY ARE YOU TO NOD OFF OR FALL ASLEEP WHILE SITTING AND READING: 2
HOW LIKELY ARE YOU TO NOD OFF OR FALL ASLEEP WHILE SITTING INACTIVE IN A PUBLIC PLACE: 1

## 2018-04-18 ENCOUNTER — PATIENT MESSAGE (OUTPATIENT)
Dept: FAMILY MEDICINE CLINIC | Age: 68
End: 2018-04-18

## 2018-04-18 DIAGNOSIS — N40.1 BENIGN PROSTATIC HYPERPLASIA WITH LOWER URINARY TRACT SYMPTOMS, SYMPTOM DETAILS UNSPECIFIED: Primary | ICD-10-CM

## 2018-04-23 ENCOUNTER — NURSE ONLY (OUTPATIENT)
Dept: FAMILY MEDICINE CLINIC | Age: 68
End: 2018-04-23

## 2018-04-23 DIAGNOSIS — N40.1 BENIGN PROSTATIC HYPERPLASIA WITH LOWER URINARY TRACT SYMPTOMS, SYMPTOM DETAILS UNSPECIFIED: ICD-10-CM

## 2018-04-23 LAB — PROSTATE SPECIFIC ANTIGEN: 1.83 NG/ML (ref 0–4)

## 2018-04-23 PROCEDURE — 36415 COLL VENOUS BLD VENIPUNCTURE: CPT | Performed by: NURSE PRACTITIONER

## 2018-04-24 RX ORDER — FUROSEMIDE 40 MG/1
TABLET ORAL
Qty: 90 TABLET | Refills: 0 | Status: SHIPPED | OUTPATIENT
Start: 2018-04-24 | End: 2018-07-24 | Stop reason: SDUPTHER

## 2018-05-14 ENCOUNTER — HOSPITAL ENCOUNTER (OUTPATIENT)
Dept: OTHER | Age: 68
Discharge: HOME | End: 2018-05-16
Attending: PSYCHIATRY & NEUROLOGY | Admitting: PSYCHIATRY & NEUROLOGY

## 2018-05-14 DIAGNOSIS — G47.33 OSA (OBSTRUCTIVE SLEEP APNEA): ICD-10-CM

## 2018-05-14 RX ORDER — FINASTERIDE 5 MG/1
TABLET, FILM COATED ORAL
Qty: 30 TABLET | Refills: 5 | Status: SHIPPED | OUTPATIENT
Start: 2018-05-14 | End: 2019-11-20 | Stop reason: SDUPTHER

## 2018-05-15 PROCEDURE — 95811 POLYSOM 6/>YRS CPAP 4/> PARM: CPT | Performed by: PSYCHIATRY & NEUROLOGY

## 2018-05-16 ENCOUNTER — TELEPHONE (OUTPATIENT)
Dept: SLEEP MEDICINE | Age: 68
End: 2018-05-16

## 2018-05-18 RX ORDER — TRAZODONE HYDROCHLORIDE 100 MG/1
TABLET ORAL
Qty: 270 TABLET | Refills: 0 | Status: SHIPPED | OUTPATIENT
Start: 2018-05-18 | End: 2018-08-15 | Stop reason: SDUPTHER

## 2018-05-18 RX ORDER — TIZANIDINE 4 MG/1
TABLET ORAL
Qty: 180 TABLET | Refills: 0 | Status: SHIPPED | OUTPATIENT
Start: 2018-05-18 | End: 2018-08-15 | Stop reason: SDUPTHER

## 2018-06-21 ENCOUNTER — HOSPITAL ENCOUNTER (OUTPATIENT)
Dept: ULTRASOUND IMAGING | Age: 68
Discharge: HOME | End: 2018-06-21
Attending: FAMILY MEDICINE | Admitting: FAMILY MEDICINE

## 2018-06-21 DIAGNOSIS — K76.89 HEPATIC CYST: ICD-10-CM

## 2018-06-21 DIAGNOSIS — K76.89 OTHER SPECIFIED DISEASES OF LIVER (CODE): ICD-10-CM

## 2018-06-21 RX ORDER — POTASSIUM CHLORIDE 750 MG/1
TABLET, FILM COATED, EXTENDED RELEASE ORAL
Qty: 30 TABLET | Refills: 2 | Status: SHIPPED | OUTPATIENT
Start: 2018-06-21 | End: 2018-09-18 | Stop reason: SDUPTHER

## 2018-06-21 RX ORDER — OXYBUTYNIN CHLORIDE 10 MG/1
TABLET, EXTENDED RELEASE ORAL
Qty: 90 TABLET | Refills: 1 | Status: SHIPPED | OUTPATIENT
Start: 2018-06-21 | End: 2018-12-06 | Stop reason: SDUPTHER

## 2018-06-21 RX ORDER — TAMSULOSIN HYDROCHLORIDE 0.4 MG/1
CAPSULE ORAL
Qty: 90 CAPSULE | Refills: 1 | Status: SHIPPED | OUTPATIENT
Start: 2018-06-21 | End: 2019-02-25 | Stop reason: ALTCHOICE

## 2018-06-27 RX ORDER — POTASSIUM CHLORIDE 750 MG/1
TABLET, FILM COATED, EXTENDED RELEASE ORAL
Qty: 30 TABLET | Refills: 2 | OUTPATIENT
Start: 2018-06-27

## 2018-06-29 ENCOUNTER — OFFICE VISIT (OUTPATIENT)
Dept: FAMILY MEDICINE CLINIC | Age: 68
End: 2018-06-29

## 2018-06-29 VITALS
HEART RATE: 67 BPM | RESPIRATION RATE: 16 BRPM | DIASTOLIC BLOOD PRESSURE: 78 MMHG | BODY MASS INDEX: 33.76 KG/M2 | SYSTOLIC BLOOD PRESSURE: 124 MMHG | HEIGHT: 76 IN | OXYGEN SATURATION: 94 % | WEIGHT: 277.2 LBS

## 2018-06-29 DIAGNOSIS — K76.89 HEPATIC CYST: Primary | ICD-10-CM

## 2018-06-29 DIAGNOSIS — G62.9 PERIPHERAL POLYNEUROPATHY: ICD-10-CM

## 2018-06-29 DIAGNOSIS — M79.605 BILATERAL LEG PAIN: ICD-10-CM

## 2018-06-29 DIAGNOSIS — M79.604 BILATERAL LEG PAIN: ICD-10-CM

## 2018-06-29 DIAGNOSIS — M25.552 LEFT HIP PAIN: ICD-10-CM

## 2018-06-29 PROCEDURE — 4040F PNEUMOC VAC/ADMIN/RCVD: CPT | Performed by: FAMILY MEDICINE

## 2018-06-29 PROCEDURE — 99213 OFFICE O/P EST LOW 20 MIN: CPT | Performed by: FAMILY MEDICINE

## 2018-06-29 PROCEDURE — 1123F ACP DISCUSS/DSCN MKR DOCD: CPT | Performed by: FAMILY MEDICINE

## 2018-06-29 PROCEDURE — G8417 CALC BMI ABV UP PARAM F/U: HCPCS | Performed by: FAMILY MEDICINE

## 2018-06-29 PROCEDURE — 1036F TOBACCO NON-USER: CPT | Performed by: FAMILY MEDICINE

## 2018-06-29 PROCEDURE — 3017F COLORECTAL CA SCREEN DOC REV: CPT | Performed by: FAMILY MEDICINE

## 2018-06-29 PROCEDURE — G8427 DOCREV CUR MEDS BY ELIG CLIN: HCPCS | Performed by: FAMILY MEDICINE

## 2018-07-24 RX ORDER — FUROSEMIDE 40 MG/1
TABLET ORAL
Qty: 90 TABLET | Refills: 0 | Status: SHIPPED | OUTPATIENT
Start: 2018-07-24 | End: 2018-10-16 | Stop reason: SDUPTHER

## 2018-08-06 RX ORDER — METOPROLOL SUCCINATE 100 MG/1
100 TABLET, EXTENDED RELEASE ORAL DAILY
Qty: 90 TABLET | Refills: 1 | Status: SHIPPED | OUTPATIENT
Start: 2018-08-06 | End: 2019-02-12 | Stop reason: SDUPTHER

## 2018-08-15 RX ORDER — TIZANIDINE 4 MG/1
TABLET ORAL
Qty: 180 TABLET | Refills: 0 | Status: SHIPPED | OUTPATIENT
Start: 2018-08-15 | End: 2018-11-19 | Stop reason: SDUPTHER

## 2018-08-15 RX ORDER — TRAZODONE HYDROCHLORIDE 100 MG/1
TABLET ORAL
Qty: 270 TABLET | Refills: 0 | Status: SHIPPED | OUTPATIENT
Start: 2018-08-15 | End: 2018-11-19 | Stop reason: SDUPTHER

## 2018-08-16 ENCOUNTER — OFFICE VISIT (OUTPATIENT)
Dept: SLEEP MEDICINE | Age: 68
End: 2018-08-16

## 2018-08-16 VITALS
TEMPERATURE: 98 F | HEART RATE: 59 BPM | HEIGHT: 76 IN | SYSTOLIC BLOOD PRESSURE: 130 MMHG | OXYGEN SATURATION: 95 % | BODY MASS INDEX: 33.93 KG/M2 | RESPIRATION RATE: 18 BRPM | WEIGHT: 278.6 LBS | DIASTOLIC BLOOD PRESSURE: 80 MMHG

## 2018-08-16 DIAGNOSIS — Z99.89 OSA ON CPAP: Primary | ICD-10-CM

## 2018-08-16 DIAGNOSIS — G47.33 OSA ON CPAP: Primary | ICD-10-CM

## 2018-08-16 PROCEDURE — 1036F TOBACCO NON-USER: CPT | Performed by: PSYCHIATRY & NEUROLOGY

## 2018-08-16 PROCEDURE — 4040F PNEUMOC VAC/ADMIN/RCVD: CPT | Performed by: PSYCHIATRY & NEUROLOGY

## 2018-08-16 PROCEDURE — G8417 CALC BMI ABV UP PARAM F/U: HCPCS | Performed by: PSYCHIATRY & NEUROLOGY

## 2018-08-16 PROCEDURE — 99213 OFFICE O/P EST LOW 20 MIN: CPT | Performed by: PSYCHIATRY & NEUROLOGY

## 2018-08-16 PROCEDURE — G8427 DOCREV CUR MEDS BY ELIG CLIN: HCPCS | Performed by: PSYCHIATRY & NEUROLOGY

## 2018-08-16 PROCEDURE — 1101F PT FALLS ASSESS-DOCD LE1/YR: CPT | Performed by: PSYCHIATRY & NEUROLOGY

## 2018-08-16 PROCEDURE — 1123F ACP DISCUSS/DSCN MKR DOCD: CPT | Performed by: PSYCHIATRY & NEUROLOGY

## 2018-08-16 PROCEDURE — 3017F COLORECTAL CA SCREEN DOC REV: CPT | Performed by: PSYCHIATRY & NEUROLOGY

## 2018-08-16 ASSESSMENT — SLEEP AND FATIGUE QUESTIONNAIRES
HOW LIKELY ARE YOU TO NOD OFF OR FALL ASLEEP WHEN YOU ARE A PASSENGER IN A CAR FOR AN HOUR WITHOUT A BREAK: 1
HOW LIKELY ARE YOU TO NOD OFF OR FALL ASLEEP WHILE SITTING AND TALKING TO SOMEONE: 0
HOW LIKELY ARE YOU TO NOD OFF OR FALL ASLEEP WHILE SITTING AND READING: 1
HOW LIKELY ARE YOU TO NOD OFF OR FALL ASLEEP WHILE WATCHING TV: 1
HOW LIKELY ARE YOU TO NOD OFF OR FALL ASLEEP WHILE LYING DOWN TO REST IN THE AFTERNOON WHEN CIRCUMSTANCES PERMIT: 3
HOW LIKELY ARE YOU TO NOD OFF OR FALL ASLEEP WHILE SITTING QUIETLY AFTER LUNCH WITHOUT ALCOHOL: 0
HOW LIKELY ARE YOU TO NOD OFF OR FALL ASLEEP WHILE SITTING INACTIVE IN A PUBLIC PLACE: 0
ESS TOTAL SCORE: 6
HOW LIKELY ARE YOU TO NOD OFF OR FALL ASLEEP IN A CAR, WHILE STOPPED FOR A FEW MINUTES IN TRAFFIC: 0

## 2018-08-16 ASSESSMENT — ENCOUNTER SYMPTOMS
GASTROINTESTINAL NEGATIVE: 1
ALLERGIC/IMMUNOLOGIC NEGATIVE: 1
APNEA: 0
EYES NEGATIVE: 1
CHOKING: 0

## 2018-08-16 NOTE — PROGRESS NOTES
MD SMOOTH Mayorga Board Certified in Sleep Medicine  Certified in [address] [facility] Certified in Neurology [facility] [address], [address] ([facility], [address]                      [address] SLEEP MEDICINE [place]    Subjective:     Patient ID: Sonali Agosto is a 70 y.o. male. Chief Complaint   Patient presents with    Follow-up     cpap       HPI:        Sonali Agosto is a 70 y.o. male was seen today as a follow for obstructive sleep apnea. The patient underwent comprehensive split polysomnogram on 05/14/2018, the overnight registration revealed severe obstructive sleep apnea with apnea hypopnea index of 75.2 with lowest O2 saturation of 67%, patient spent about 100.9 minutes below 90%. Subsequently, the patient underwent successful PAP titration on the second half of the night, the lowest O2 saturation while on PAP was 90%. Patient is using the PAP machine about 97% of the time, more than 4 hours a night about  97 %, in total average of 7:46 hours a night in last 30 days. Currently on PAP at 13 cm, the AHI is only 2.3 events per hour at this pressure. Patient improved regarding daytime sleepiness and fatigue, wakes up refreshed in the morning. The Patient scored Total score: 6 on Bolt Sleepiness Scale ( more than 10 is indicative of daytime sleepiness)   Patient has no problem with PAP pressure or mask, uses SimPlus  Wakes up in the morning with dry mouth, the setting of the heated humidifier at # 2. DOT/CDL - N/A        Previous Report(s) Reviewed: historical medical records         Social History     Social History    Marital status:      Spouse name: N/A    Number of children: N/A    Years of education: N/A     Occupational History    Not on file.      Social History Main Topics    Smoking supplies, mask, filters. ... No orders of the defined types were placed in this encounter. Return in about 1 year (around 8/16/2019) for Reveiwing CPAP usage and compliance report and tro.     Mya York MD  Medical Director [address] [place] [address]

## 2018-08-17 DIAGNOSIS — M47.816 ARTHROPATHY OF LUMBAR FACET JOINT: ICD-10-CM

## 2018-08-17 DIAGNOSIS — M16.10 ARTHRITIS, HIP: ICD-10-CM

## 2018-08-17 DIAGNOSIS — M51.36 DDD (DEGENERATIVE DISC DISEASE), LUMBAR: ICD-10-CM

## 2018-08-17 DIAGNOSIS — M51.37 DEGENERATION OF LUMBAR OR LUMBOSACRAL INTERVERTEBRAL DISC: ICD-10-CM

## 2018-08-17 RX ORDER — GABAPENTIN 800 MG/1
TABLET ORAL
Qty: 90 TABLET | Refills: 2 | Status: SHIPPED | OUTPATIENT
Start: 2018-08-17 | End: 2018-11-14 | Stop reason: SDUPTHER

## 2018-08-28 ENCOUNTER — OFFICE VISIT (OUTPATIENT)
Dept: FAMILY MEDICINE CLINIC | Age: 68
End: 2018-08-28

## 2018-08-28 VITALS
OXYGEN SATURATION: 97 % | WEIGHT: 278.2 LBS | SYSTOLIC BLOOD PRESSURE: 130 MMHG | DIASTOLIC BLOOD PRESSURE: 72 MMHG | HEIGHT: 76 IN | BODY MASS INDEX: 33.88 KG/M2 | TEMPERATURE: 97.7 F | HEART RATE: 61 BPM

## 2018-08-28 DIAGNOSIS — J01.91 ACUTE RECURRENT SINUSITIS, UNSPECIFIED LOCATION: Primary | ICD-10-CM

## 2018-08-28 PROCEDURE — G8417 CALC BMI ABV UP PARAM F/U: HCPCS | Performed by: FAMILY MEDICINE

## 2018-08-28 PROCEDURE — 1123F ACP DISCUSS/DSCN MKR DOCD: CPT | Performed by: FAMILY MEDICINE

## 2018-08-28 PROCEDURE — 1036F TOBACCO NON-USER: CPT | Performed by: FAMILY MEDICINE

## 2018-08-28 PROCEDURE — 3017F COLORECTAL CA SCREEN DOC REV: CPT | Performed by: FAMILY MEDICINE

## 2018-08-28 PROCEDURE — 4040F PNEUMOC VAC/ADMIN/RCVD: CPT | Performed by: FAMILY MEDICINE

## 2018-08-28 PROCEDURE — 1101F PT FALLS ASSESS-DOCD LE1/YR: CPT | Performed by: FAMILY MEDICINE

## 2018-08-28 PROCEDURE — G8427 DOCREV CUR MEDS BY ELIG CLIN: HCPCS | Performed by: FAMILY MEDICINE

## 2018-08-28 PROCEDURE — 99213 OFFICE O/P EST LOW 20 MIN: CPT | Performed by: FAMILY MEDICINE

## 2018-08-28 RX ORDER — AZITHROMYCIN 250 MG/1
TABLET, FILM COATED ORAL
Qty: 6 TABLET | Refills: 0 | Status: SHIPPED | OUTPATIENT
Start: 2018-08-28 | End: 2018-09-07

## 2018-08-28 NOTE — PROGRESS NOTES
Subjective:       Yolanda Agosto is a 70 y.o. male who presents for evaluation of possible sinus infection. Symptoms include achiness, congestion, facial pain, nasal congestion, post nasal drip, purulent nasal discharge and sinus pressure with no fever, chills, night sweats or weight loss. Onset of symptoms was 3 days ago, gradually worsening since that time. He is drinking plenty of fluids. Past history is significant for yearly sinus infection which resolves with Zpack. Patient is a non-smoker. Patient's medications, allergies, past medical, surgical, social and family histories were reviewed and updated as appropriate. Review of Systems  Pertinent items are noted in HPI. Objective:      /72 (Site: Left Arm, Position: Sitting, Cuff Size: Large Adult)   Pulse 61   Temp 97.7 °F (36.5 °C) (Oral)   Ht 6' 4\" (1.93 m)   Wt 278 lb 3.2 oz (126.2 kg) Comment: WITH SANDELS  SpO2 97%   BMI 33.86 kg/m²      General appearance: alert, appears stated age and cooperative  Head: Normocephalic, without obvious abnormality, atraumatic, sinuses tender to percussion  Eyes: conjunctivae/corneas clear. PERRL, EOM's intact. Ears: normal TM's and external ear canals both ears  Nose: mild congestion  Throat: lips, mucosa, and tongue normal; teeth and gums normal  Lungs: clear to auscultation bilaterally  Heart: regular rate and rhythm     Assessment:      Acute possible bacterial sinusitis      Plan:      1. Suggest otc nasal steroid  2. Zithromax  3. Nasal saline rinses as needed for congestion.   4. Follow-up with PCP in several days if symptoms worsen or persist.

## 2018-09-20 ENCOUNTER — OFFICE VISIT (OUTPATIENT)
Dept: FAMILY MEDICINE CLINIC | Age: 68
End: 2018-09-20

## 2018-09-20 VITALS
SYSTOLIC BLOOD PRESSURE: 122 MMHG | HEART RATE: 66 BPM | BODY MASS INDEX: 33.73 KG/M2 | WEIGHT: 277 LBS | RESPIRATION RATE: 16 BRPM | HEIGHT: 76 IN | DIASTOLIC BLOOD PRESSURE: 74 MMHG

## 2018-09-20 DIAGNOSIS — G56.02 CARPAL TUNNEL SYNDROME ON LEFT: ICD-10-CM

## 2018-09-20 DIAGNOSIS — Z23 NEED FOR INFLUENZA VACCINATION: ICD-10-CM

## 2018-09-20 DIAGNOSIS — R97.20 PSA ELEVATION: ICD-10-CM

## 2018-09-20 DIAGNOSIS — N40.0 BENIGN PROSTATIC HYPERPLASIA, UNSPECIFIED WHETHER LOWER URINARY TRACT SYMPTOMS PRESENT: ICD-10-CM

## 2018-09-20 DIAGNOSIS — G62.9 PERIPHERAL POLYNEUROPATHY: ICD-10-CM

## 2018-09-20 DIAGNOSIS — E78.00 HYPERCHOLESTEREMIA: ICD-10-CM

## 2018-09-20 DIAGNOSIS — I77.9 CAROTID DISEASE, BILATERAL (HCC): ICD-10-CM

## 2018-09-20 DIAGNOSIS — Z99.89 OSA ON CPAP: ICD-10-CM

## 2018-09-20 DIAGNOSIS — G47.33 OSA ON CPAP: ICD-10-CM

## 2018-09-20 DIAGNOSIS — I10 ESSENTIAL HYPERTENSION: ICD-10-CM

## 2018-09-20 DIAGNOSIS — K76.89 LIVER CYST: Primary | ICD-10-CM

## 2018-09-20 DIAGNOSIS — M47.816 ARTHROPATHY OF LUMBAR FACET JOINT: ICD-10-CM

## 2018-09-20 PROCEDURE — 99214 OFFICE O/P EST MOD 30 MIN: CPT | Performed by: FAMILY MEDICINE

## 2018-09-20 PROCEDURE — 1123F ACP DISCUSS/DSCN MKR DOCD: CPT | Performed by: FAMILY MEDICINE

## 2018-09-20 PROCEDURE — G8417 CALC BMI ABV UP PARAM F/U: HCPCS | Performed by: FAMILY MEDICINE

## 2018-09-20 PROCEDURE — G0008 ADMIN INFLUENZA VIRUS VAC: HCPCS | Performed by: FAMILY MEDICINE

## 2018-09-20 PROCEDURE — 90662 IIV NO PRSV INCREASED AG IM: CPT | Performed by: FAMILY MEDICINE

## 2018-09-20 PROCEDURE — 3017F COLORECTAL CA SCREEN DOC REV: CPT | Performed by: FAMILY MEDICINE

## 2018-09-20 PROCEDURE — 1101F PT FALLS ASSESS-DOCD LE1/YR: CPT | Performed by: FAMILY MEDICINE

## 2018-09-20 PROCEDURE — 4040F PNEUMOC VAC/ADMIN/RCVD: CPT | Performed by: FAMILY MEDICINE

## 2018-09-20 PROCEDURE — G8510 SCR DEP NEG, NO PLAN REQD: HCPCS | Performed by: FAMILY MEDICINE

## 2018-09-20 PROCEDURE — 1036F TOBACCO NON-USER: CPT | Performed by: FAMILY MEDICINE

## 2018-09-20 PROCEDURE — G8427 DOCREV CUR MEDS BY ELIG CLIN: HCPCS | Performed by: FAMILY MEDICINE

## 2018-09-20 ASSESSMENT — PATIENT HEALTH QUESTIONNAIRE - PHQ9
1. LITTLE INTEREST OR PLEASURE IN DOING THINGS: 0
SUM OF ALL RESPONSES TO PHQ QUESTIONS 1-9: 0
SUM OF ALL RESPONSES TO PHQ9 QUESTIONS 1 & 2: 0
SUM OF ALL RESPONSES TO PHQ QUESTIONS 1-9: 0
2. FEELING DOWN, DEPRESSED OR HOPELESS: 0

## 2018-09-20 NOTE — PROGRESS NOTES
Subjective:      Patient ID: Noah Agosto is a 70 y.o. male. HPI  Chief Complaint   Patient presents with    Results     WANTS TO FOLLOW UP ON LIFE SCREEN HE HAD DONE AND HE HAD [address] ALSO HAS A CYST NEXT TO LIVER HE IS SEEING SOMEONE FOR BUT WANTS TO DISCUSS WITH [address].  Depression     NEGATIVE DEPRESSION SCREEN    HAD RECENT SCREENING  DONE - HAS NEUROPATHY IN LEGS - PAD SCREEN NEGATIVE  HAD MILD ASVD IN CAROTID - GOING TO [employer] -PLANS TO GET . WALKING A LOT DOWN TOWN- TRIES TO STAY ACTIVE  Low testosterone - has low libido. Mood generally good  Strength seems okay - just leg issues  Felt like left leg wanting to give out  Left hand goes numb at night - has to work it to keep from falling asleep  Sleep doing well - using cpap for thompson -makes big difference  Wife also sleeping better. BP Readings from Last 3 Encounters:   09/20/18 122/74   08/28/18 130/72   08/16/18 130/80     Pulse Readings from Last 3 Encounters:   09/20/18 66   08/28/18 61   08/16/18 59     Wt Readings from Last 3 Encounters:   09/20/18 277 lb (125.6 kg)   08/28/18 278 lb 3.2 oz (126.2 kg)   08/16/18 278 lb 9.6 oz (126.4 kg)   urine flow generally good on present meds  On cymbalta daily - oa pain in hips - back fairly good overall  Stiff/ achy all over  Not much swelling in feet/ ankles. Review of Systems    Objective:   Physical Exam   Constitutional: He appears well-developed. No distress. HENT:   Mouth/Throat: Oropharynx is clear and moist.   Eyes: Conjunctivae are normal. No scleral icterus. Cardiovascular: Normal rate, regular rhythm and normal heart sounds. Exam reveals no gallop. No murmur heard. Pulmonary/Chest: Effort normal and breath sounds normal. No respiratory distress. He has no wheezes. He has no rhonchi. He has no rales. Abdominal: Soft. Bowel sounds are normal. He exhibits no distension. There is no tenderness. Musculoskeletal: He exhibits no edema.    Neurological: He is alert.   Skin: Skin is intact. No rash noted. No erythema. Psychiatric: He has a normal mood and affect. Assessment:       Diagnosis Orders   1. Liver cyst     2. Need for influenza vaccination     3. PSA elevation  PSA, Prostatic Specific Antigen   4. Hypercholesteremia  Lipid Panel   5. Carotid disease, bilateral ([address]     6. Peripheral polyneuropathy     7. Essential hypertension  Comprehensive Metabolic Panel   8. MATIAS on CPAP     9. Benign prostatic hyperplasia, unspecified whether lower urinary tract symptoms present     10. Carpal tunnel syndrome on left             Plan:      Cymbalta/ neurontin for chronic nerve pain/ oa pain  Flu shot today  Fasting labs next month  bp stable on bb/ avapro/ hctz  Trazodone w/ cpap for sleep - working well  Ditropan for oab  zanaflex for tight muscles at night  Cut back on lasix to qod and if tolerated - prn - w/ kcl  Mri/ specialist note reviewed as well as lif[facility] screen  Low testosterone dw/ pt at length - hold on tx for now  Carotid scan in 1-2 years  Weight loss/ diet/ exercise and import of lipid,bp.   Asa low dose +/- d/w pt - cont low dose for now as long as tolerated w/ carotid disease          Olivier Toribio MD

## 2018-09-21 RX ORDER — IRBESARTAN 300 MG/1
300 TABLET ORAL DAILY
Qty: 90 TABLET | Refills: 2 | Status: SHIPPED | OUTPATIENT
Start: 2018-09-21 | End: 2019-07-01 | Stop reason: SDUPTHER

## 2018-09-21 RX ORDER — POTASSIUM CHLORIDE 750 MG/1
10 TABLET, FILM COATED, EXTENDED RELEASE ORAL DAILY
Qty: 90 TABLET | Refills: 2 | Status: SHIPPED | OUTPATIENT
Start: 2018-09-21 | End: 2019-06-26 | Stop reason: SDUPTHER

## 2018-10-15 RX ORDER — DULOXETIN HYDROCHLORIDE 60 MG/1
CAPSULE, DELAYED RELEASE ORAL
Qty: 90 CAPSULE | Refills: 1 | Status: SHIPPED | OUTPATIENT
Start: 2018-10-15 | End: 2019-04-05 | Stop reason: SDUPTHER

## 2018-10-15 RX ORDER — HYDROCHLOROTHIAZIDE 25 MG/1
TABLET ORAL
Qty: 90 TABLET | Refills: 1 | Status: SHIPPED | OUTPATIENT
Start: 2018-10-15 | End: 2019-04-05 | Stop reason: SDUPTHER

## 2018-10-16 RX ORDER — ATORVASTATIN CALCIUM 80 MG/1
TABLET, FILM COATED ORAL
Qty: 90 TABLET | Refills: 3 | Status: SHIPPED | OUTPATIENT
Start: 2018-10-16 | End: 2019-10-03 | Stop reason: SDUPTHER

## 2018-10-16 RX ORDER — FUROSEMIDE 40 MG/1
TABLET ORAL
Qty: 90 TABLET | Refills: 0 | Status: SHIPPED | OUTPATIENT
Start: 2018-10-16 | End: 2019-01-16 | Stop reason: SDUPTHER

## 2018-10-18 ENCOUNTER — NURSE ONLY (OUTPATIENT)
Dept: FAMILY MEDICINE CLINIC | Age: 68
End: 2018-10-18
Payer: MEDICARE

## 2018-10-18 DIAGNOSIS — E78.00 HYPERCHOLESTEREMIA: ICD-10-CM

## 2018-10-18 DIAGNOSIS — I10 ESSENTIAL HYPERTENSION: ICD-10-CM

## 2018-10-18 DIAGNOSIS — R97.20 PSA ELEVATION: ICD-10-CM

## 2018-10-18 LAB
A/G RATIO: 2.4 (ref 1.1–2.2)
ALBUMIN SERPL-MCNC: 4.3 G/DL (ref 3.4–5)
ALP BLD-CCNC: 77 U/L (ref 40–129)
ALT SERPL-CCNC: 20 U/L (ref 10–40)
ANION GAP SERPL CALCULATED.3IONS-SCNC: 13 MMOL/L (ref 3–16)
AST SERPL-CCNC: 15 U/L (ref 15–37)
BILIRUB SERPL-MCNC: 0.4 MG/DL (ref 0–1)
BUN BLDV-MCNC: 21 MG/DL (ref 7–20)
CALCIUM SERPL-MCNC: 9.7 MG/DL (ref 8.3–10.6)
CHLORIDE BLD-SCNC: 101 MMOL/L (ref 99–110)
CHOLESTEROL, TOTAL: 168 MG/DL (ref 0–199)
CO2: 28 MMOL/L (ref 21–32)
CREAT SERPL-MCNC: 0.9 MG/DL (ref 0.8–1.3)
GFR AFRICAN AMERICAN: >60
GFR NON-AFRICAN AMERICAN: >60
GLOBULIN: 1.8 G/DL
GLUCOSE BLD-MCNC: 108 MG/DL (ref 70–99)
HDLC SERPL-MCNC: 45 MG/DL (ref 40–60)
LDL CHOLESTEROL CALCULATED: 102 MG/DL
POTASSIUM SERPL-SCNC: 4.1 MMOL/L (ref 3.5–5.1)
PROSTATE SPECIFIC ANTIGEN: 2.09 NG/ML (ref 0–4)
SODIUM BLD-SCNC: 142 MMOL/L (ref 136–145)
TOTAL PROTEIN: 6.1 G/DL (ref 6.4–8.2)
TRIGL SERPL-MCNC: 104 MG/DL (ref 0–150)
VLDLC SERPL CALC-MCNC: 21 MG/DL

## 2018-10-18 PROCEDURE — 36415 COLL VENOUS BLD VENIPUNCTURE: CPT | Performed by: FAMILY MEDICINE

## 2018-10-18 RX ORDER — OMEPRAZOLE 20 MG/1
CAPSULE, DELAYED RELEASE ORAL
Qty: 90 CAPSULE | Refills: 1 | Status: SHIPPED | OUTPATIENT
Start: 2018-10-18 | End: 2019-02-12 | Stop reason: SDUPTHER

## 2018-10-18 RX ORDER — ATORVASTATIN CALCIUM 80 MG/1
TABLET, FILM COATED ORAL
Qty: 30 TABLET | Refills: 1 | OUTPATIENT
Start: 2018-10-18

## 2018-11-13 RX ORDER — METOPROLOL SUCCINATE 100 MG/1
100 TABLET, EXTENDED RELEASE ORAL DAILY
Qty: 90 TABLET | Refills: 1 | OUTPATIENT
Start: 2018-11-13

## 2018-11-14 DIAGNOSIS — M47.816 ARTHROPATHY OF LUMBAR FACET JOINT: ICD-10-CM

## 2018-11-14 DIAGNOSIS — M16.10 ARTHRITIS, HIP: ICD-10-CM

## 2018-11-14 DIAGNOSIS — M51.36 DDD (DEGENERATIVE DISC DISEASE), LUMBAR: ICD-10-CM

## 2018-11-14 DIAGNOSIS — M51.37 DEGENERATION OF LUMBAR OR LUMBOSACRAL INTERVERTEBRAL DISC: ICD-10-CM

## 2018-11-15 RX ORDER — GABAPENTIN 800 MG/1
TABLET ORAL
Qty: 180 TABLET | Refills: 0 | Status: SHIPPED | OUTPATIENT
Start: 2018-11-15 | End: 2019-05-28 | Stop reason: SDUPTHER

## 2018-11-19 RX ORDER — TRAZODONE HYDROCHLORIDE 100 MG/1
TABLET ORAL
Qty: 270 TABLET | Refills: 0 | Status: SHIPPED | OUTPATIENT
Start: 2018-11-19 | End: 2019-02-14 | Stop reason: SDUPTHER

## 2018-11-19 RX ORDER — TIZANIDINE 4 MG/1
TABLET ORAL
Qty: 180 TABLET | Refills: 0 | Status: SHIPPED | OUTPATIENT
Start: 2018-11-19 | End: 2019-02-14 | Stop reason: SDUPTHER

## 2018-12-06 RX ORDER — OXYBUTYNIN CHLORIDE 10 MG/1
10 TABLET, EXTENDED RELEASE ORAL DAILY
Qty: 90 TABLET | Refills: 1 | Status: SHIPPED | OUTPATIENT
Start: 2018-12-06 | End: 2019-05-19 | Stop reason: SDUPTHER

## 2019-01-10 DIAGNOSIS — F43.22 ADJUSTMENT DISORDER WITH ANXIETY: Primary | ICD-10-CM

## 2019-01-10 DIAGNOSIS — F40.240 CLAUSTROPHOBIA: ICD-10-CM

## 2019-01-10 RX ORDER — ALPRAZOLAM 0.5 MG/1
TABLET ORAL
Qty: 20 TABLET | Refills: 0 | Status: SHIPPED | OUTPATIENT
Start: 2019-01-10 | End: 2019-01-10

## 2019-01-16 RX ORDER — FUROSEMIDE 40 MG/1
TABLET ORAL
Qty: 90 TABLET | Refills: 0 | Status: SHIPPED | OUTPATIENT
Start: 2019-01-16 | End: 2019-04-11 | Stop reason: SDUPTHER

## 2019-01-31 ENCOUNTER — TELEPHONE (OUTPATIENT)
Dept: FAMILY MEDICINE CLINIC | Age: 69
End: 2019-01-31

## 2019-01-31 RX ORDER — L-METHYLFOLATE-ALGAE-VIT B12-B6 CAP 3-90.314-2-35 MG 3-90.314-2-35 MG
1 CAP ORAL 2 TIMES DAILY
Qty: 180 CAPSULE | Refills: 3 | Status: SHIPPED | OUTPATIENT
Start: 2019-01-31 | End: 2020-01-23

## 2019-02-12 RX ORDER — METOPROLOL SUCCINATE 100 MG/1
100 TABLET, EXTENDED RELEASE ORAL DAILY
Qty: 90 TABLET | Refills: 1 | Status: SHIPPED | OUTPATIENT
Start: 2019-02-12 | End: 2019-08-15 | Stop reason: SDUPTHER

## 2019-02-12 RX ORDER — OMEPRAZOLE 20 MG/1
CAPSULE, DELAYED RELEASE ORAL
Qty: 90 CAPSULE | Refills: 1 | Status: SHIPPED | OUTPATIENT
Start: 2019-02-12 | End: 2019-10-28 | Stop reason: SDUPTHER

## 2019-02-14 RX ORDER — TRAZODONE HYDROCHLORIDE 100 MG/1
TABLET ORAL
Qty: 270 TABLET | Refills: 0 | Status: SHIPPED | OUTPATIENT
Start: 2019-02-14 | End: 2019-05-11 | Stop reason: SDUPTHER

## 2019-02-14 RX ORDER — TIZANIDINE 4 MG/1
TABLET ORAL
Qty: 180 TABLET | Refills: 0 | Status: SHIPPED | OUTPATIENT
Start: 2019-02-14 | End: 2019-05-11 | Stop reason: SDUPTHER

## 2019-02-25 RX ORDER — TAMSULOSIN HYDROCHLORIDE 0.4 MG/1
CAPSULE ORAL
Qty: 90 CAPSULE | Refills: 0 | Status: SHIPPED | OUTPATIENT
Start: 2019-02-25 | End: 2019-05-19 | Stop reason: SDUPTHER

## 2019-04-05 ENCOUNTER — OFFICE VISIT (OUTPATIENT)
Dept: FAMILY MEDICINE CLINIC | Age: 69
End: 2019-04-05
Payer: MEDICARE

## 2019-04-05 VITALS
HEIGHT: 76 IN | BODY MASS INDEX: 33.12 KG/M2 | HEART RATE: 60 BPM | OXYGEN SATURATION: 98 % | TEMPERATURE: 98.4 F | WEIGHT: 272 LBS | RESPIRATION RATE: 16 BRPM | SYSTOLIC BLOOD PRESSURE: 120 MMHG | DIASTOLIC BLOOD PRESSURE: 70 MMHG

## 2019-04-05 DIAGNOSIS — H25.013 CORTICAL AGE-RELATED CATARACT OF BOTH EYES: Primary | ICD-10-CM

## 2019-04-05 DIAGNOSIS — F33.40 RECURRENT MAJOR DEPRESSIVE DISORDER, IN REMISSION (HCC): ICD-10-CM

## 2019-04-05 DIAGNOSIS — G47.33 OSA (OBSTRUCTIVE SLEEP APNEA): ICD-10-CM

## 2019-04-05 DIAGNOSIS — M16.10 ARTHRITIS, HIP: ICD-10-CM

## 2019-04-05 DIAGNOSIS — E78.00 HYPERCHOLESTEREMIA: ICD-10-CM

## 2019-04-05 DIAGNOSIS — F33.42 MAJOR DEPRESSIVE DISORDER, RECURRENT, IN FULL REMISSION (HCC): ICD-10-CM

## 2019-04-05 DIAGNOSIS — K76.89 LIVER CYST: ICD-10-CM

## 2019-04-05 DIAGNOSIS — I10 ESSENTIAL HYPERTENSION: ICD-10-CM

## 2019-04-05 DIAGNOSIS — N40.1 BENIGN PROSTATIC HYPERPLASIA WITH LOWER URINARY TRACT SYMPTOMS, SYMPTOM DETAILS UNSPECIFIED: ICD-10-CM

## 2019-04-05 LAB
A/G RATIO: 2 (ref 1.1–2.2)
ALBUMIN SERPL-MCNC: 4.5 G/DL (ref 3.4–5)
ALP BLD-CCNC: 101 U/L (ref 40–129)
ALT SERPL-CCNC: 23 U/L (ref 10–40)
ANION GAP SERPL CALCULATED.3IONS-SCNC: 12 MMOL/L (ref 3–16)
AST SERPL-CCNC: 18 U/L (ref 15–37)
BILIRUB SERPL-MCNC: 0.5 MG/DL (ref 0–1)
BUN BLDV-MCNC: 26 MG/DL (ref 7–20)
CALCIUM SERPL-MCNC: 9.7 MG/DL (ref 8.3–10.6)
CHLORIDE BLD-SCNC: 98 MMOL/L (ref 99–110)
CHOLESTEROL, TOTAL: 200 MG/DL (ref 0–199)
CO2: 31 MMOL/L (ref 21–32)
CREAT SERPL-MCNC: 1.1 MG/DL (ref 0.8–1.3)
GFR AFRICAN AMERICAN: >60
GFR NON-AFRICAN AMERICAN: >60
GLOBULIN: 2.3 G/DL
GLUCOSE BLD-MCNC: 105 MG/DL (ref 70–99)
HDLC SERPL-MCNC: 49 MG/DL (ref 40–60)
LDL CHOLESTEROL CALCULATED: 118 MG/DL
POTASSIUM SERPL-SCNC: 4.1 MMOL/L (ref 3.5–5.1)
PROSTATE SPECIFIC ANTIGEN: 3.18 NG/ML (ref 0–4)
SODIUM BLD-SCNC: 141 MMOL/L (ref 136–145)
TOTAL PROTEIN: 6.8 G/DL (ref 6.4–8.2)
TRIGL SERPL-MCNC: 163 MG/DL (ref 0–150)
VLDLC SERPL CALC-MCNC: 33 MG/DL

## 2019-04-05 PROCEDURE — G8417 CALC BMI ABV UP PARAM F/U: HCPCS | Performed by: FAMILY MEDICINE

## 2019-04-05 PROCEDURE — 3017F COLORECTAL CA SCREEN DOC REV: CPT | Performed by: FAMILY MEDICINE

## 2019-04-05 PROCEDURE — 1123F ACP DISCUSS/DSCN MKR DOCD: CPT | Performed by: FAMILY MEDICINE

## 2019-04-05 PROCEDURE — 36415 COLL VENOUS BLD VENIPUNCTURE: CPT | Performed by: FAMILY MEDICINE

## 2019-04-05 PROCEDURE — 4040F PNEUMOC VAC/ADMIN/RCVD: CPT | Performed by: FAMILY MEDICINE

## 2019-04-05 PROCEDURE — 99214 OFFICE O/P EST MOD 30 MIN: CPT | Performed by: FAMILY MEDICINE

## 2019-04-05 PROCEDURE — G8427 DOCREV CUR MEDS BY ELIG CLIN: HCPCS | Performed by: FAMILY MEDICINE

## 2019-04-05 PROCEDURE — 1036F TOBACCO NON-USER: CPT | Performed by: FAMILY MEDICINE

## 2019-04-05 RX ORDER — DULOXETIN HYDROCHLORIDE 60 MG/1
CAPSULE, DELAYED RELEASE ORAL
Qty: 90 CAPSULE | Refills: 3 | Status: SHIPPED | OUTPATIENT
Start: 2019-04-05 | End: 2020-03-31

## 2019-04-05 RX ORDER — HYDROCHLOROTHIAZIDE 25 MG/1
TABLET ORAL
Qty: 90 TABLET | Refills: 3 | Status: SHIPPED | OUTPATIENT
Start: 2019-04-05 | End: 2020-03-31

## 2019-04-05 NOTE — PROGRESS NOTES
2019     Sindi Agosto (:  1950) is a 78 y.o. male, here for evaluation of the following medical concerns:    HPI  NIGHT VISION EFFECTED OU - GUSTABO W/ DRIVING  Getting cataracts done soon - one eye then another  Went to [place]/ [place] for 1 month - 78 birthday present  BP Readings from Last 3 Encounters:   19 120/70   18 122/74   18 130/72     Pulse Readings from Last 3 Encounters:   19 60   18 66   18 61     Wt Readings from Last 3 Encounters:   19 272 lb (123.4 kg)   18 277 lb (125.6 kg)   18 278 lb 3.2 oz (126.2 kg)     Doing 10k steps daily. Good balanced diet. Joints pretty good  No cp/palp/sob/  Quit smoking . Lost hair w/ anesthetic after thr , left - no other problems  No urinary changes/ frequency/ weak stream  No n/v/c/d/ abd/ pain/ gerd  No recent uri/ fever - some mild cough  Needs refills on cymbalta/ hctz    Past Surgical History:   Procedure Laterality Date    COLONOSCOPY  2008    normal,    COLONOSCOPY  2018    [place]    JOINT REPLACEMENT Left     TONSILLECTOMY AND ADENOIDECTOMY             Review of Systems    Prior to Visit Medications    Medication Sig Taking? Authorizing Provider   tamsulosin (FLOMAX) 0.4 MG capsule TAKE 1 CAPSULE BY MOUTH DAILY. Yes SARA Chamberlain CNP   traZODone (DESYREL) 100 MG tablet TAKE 2 TO 3 TABLETS BY MOUTH NIGHTLY.  Yes SARA Menjivar CNP   tiZANidine (ZANAFLEX) 4 MG tablet TAKE 2 TABLETS BY MOUTH EVERY DAY IN THE EVENING Yes SARA Menjivar CNP   omeprazole (PRILOSEC) 20 MG delayed release capsule TAKE ONE CAPSULE BY MOUTH EVERY DAY Yes SARA Fish CNP   metoprolol succinate (TOPROL XL) 100 MG extended release tablet TAKE 1 TABLET BY MOUTH DAILY Yes SARA Fish CNP   L-methylfolate-B6-B12 [facility]) 9-21.949-7-49 MG CAPS capsule Take 1 capsule by mouth 2 times daily Yes Mike Serrano MD   furosemide (LASIX) 40 MG tablet TAKE 1 TABLET BY MOUTH EVERY DAY Yes Wilmar Benitez MD   oxybutynin (DITROPAN-XL) 10 MG extended release tablet Take 1 tablet by mouth daily Yes Wilmar Benitez MD   gabapentin (NEURONTIN) 800 MG tablet TAKE 1 TABLET BY MOUTH 3 TIMES A DAY. Yes SARA Lama CNP   atorvastatin (LIPITOR) 80 MG tablet TAKE 1 TABLET BY MOUTH EVERY DAY Yes Wilmar Benitez MD   DULoxetine (CYMBALTA) 60 MG extended release capsule TAKE 1 CAPSULE BY MOUTH DAILY Yes SARA Lira CNP   hydrochlorothiazide (HYDRODIURIL) 25 MG tablet TAKE 1 TABLET BY MOUTH EVERY DAY Yes SARA Lira CNP   potassium chloride (KLOR-CON 10) 10 MEQ extended release tablet Take 1 tablet by mouth daily Yes SARA Lama CNP   irbesartan (AVAPRO) 300 MG tablet Take 1 tablet by mouth daily Yes Wilmar Benitez MD   finasteride (PROSCAR) 5 MG tablet TAKE 1 TABLET BY MOUTH EVERY DAY Yes SARA Lama CNP   ibuprofen (ADVIL;MOTRIN) 200 MG tablet Take 200 mg by mouth every 6 hours as needed for Pain. Yes Historical Provider, MD   Pseudoephedrine-Guaifenesin [facility] D PO) Take 1 tablet by mouth daily. Yes Historical Provider, MD   vitamin D-3 (CHOLECALCIFEROL) 5000 UNITS TABS Take 5,000 Units by mouth daily.  Yes Historical Provider, MD        Social History     Tobacco Use    Smoking status: Former Smoker     Packs/day: 2.00     Years: 8.00     Pack years: 16.00     Last attempt to quit: 1974     Years since quittin.2    Smokeless tobacco: Never Used    Tobacco comment: quit smoking 40 years ago   Substance Use Topics    Alcohol use: Yes    see H and P for complete ros    Vitals:    19 0918   BP: 120/70   Site: Left Upper Arm   Position: Sitting   Cuff Size: Large Adult   Pulse: 60   Resp: 16   Temp: 98.4 °F (36.9 °C)   TempSrc: Oral   SpO2: 98%   Weight: 272 lb (123.4 kg)   Height: 6' 4\" (1.93 m)   cpap helps thompson - energy/ sleep  Estimated body mass index is 33.11 kg/m² as calculated from the following: Height as of this encounter: 6' 4\" (1.93 m). Weight as of this encounter: 272 lb (123.4 kg). Physical Exam   Constitutional: He appears well-developed. No distress. HENT:   Mouth/Throat: Oropharynx is clear and moist.   Eyes: Conjunctivae are normal. No scleral icterus. Cardiovascular: Normal rate, regular rhythm, normal heart sounds and intact distal pulses. Exam reveals no gallop. No murmur heard. Pulmonary/Chest: Effort normal and breath sounds normal. No respiratory distress. He has no wheezes. He has no rhonchi. He has no rales. Abdominal: Soft. Bowel sounds are normal. He exhibits no distension. There is no tenderness. Musculoskeletal: He exhibits no edema. Neurological: He is alert. Skin: Skin is intact. No rash noted. No erythema. Psychiatric: He has a normal mood and affect. ASSESSMENT/PLAN:  There are no diagnoses linked to this encounter. Noa Bacon was seen today for pre-op exam.    Diagnoses and all orders for this visit:    Cortical age-related cataract of both eyes  Clear for surgery - low risk ou  Essential hypertension  Stable on present regimen  Hypercholesteremia  On lipitor - check labs today  Arthritis, hip  Doing well s/p thr  Benign prostatic hyperplasia with lower urinary tract symptoms, symptom details unspecified  proscar - stable  Recurrent major depressive disorder, in remission ([facility] [address]  cymbalta refill - stable  Liver cyst  F/u gi yearly for mointoring  MATIAS (obstructive sleep apnea)  cpap - doing well      No follow-ups on file. An electronic signature was used to authenticate this note.     --Eliu Rudolph MD on 4/5/2019 at 9:37 AM

## 2019-04-12 RX ORDER — FUROSEMIDE 40 MG/1
TABLET ORAL
Qty: 90 TABLET | Refills: 0 | Status: SHIPPED | OUTPATIENT
Start: 2019-04-12 | End: 2019-07-09 | Stop reason: SDUPTHER

## 2019-05-13 RX ORDER — TRAZODONE HYDROCHLORIDE 100 MG/1
TABLET ORAL
Qty: 270 TABLET | Refills: 0 | Status: SHIPPED | OUTPATIENT
Start: 2019-05-13 | End: 2019-08-16 | Stop reason: SDUPTHER

## 2019-05-13 RX ORDER — TIZANIDINE 4 MG/1
TABLET ORAL
Qty: 180 TABLET | Refills: 0 | Status: SHIPPED | OUTPATIENT
Start: 2019-05-13 | End: 2019-08-06 | Stop reason: SDUPTHER

## 2019-05-20 RX ORDER — TAMSULOSIN HYDROCHLORIDE 0.4 MG/1
CAPSULE ORAL
Qty: 90 CAPSULE | Refills: 0 | Status: SHIPPED | OUTPATIENT
Start: 2019-05-20 | End: 2019-08-14 | Stop reason: SDUPTHER

## 2019-05-20 RX ORDER — OXYBUTYNIN CHLORIDE 10 MG/1
TABLET, EXTENDED RELEASE ORAL
Qty: 90 TABLET | Refills: 1 | Status: SHIPPED | OUTPATIENT
Start: 2019-05-20 | End: 2019-11-18 | Stop reason: SDUPTHER

## 2019-06-05 ENCOUNTER — TELEPHONE (OUTPATIENT)
Dept: FAMILY MEDICINE CLINIC | Age: 69
End: 2019-06-05

## 2019-06-05 RX ORDER — AMOXICILLIN 875 MG/1
875 TABLET, COATED ORAL 2 TIMES DAILY
Qty: 14 TABLET | Refills: 0 | Status: SHIPPED | OUTPATIENT
Start: 2019-06-05 | End: 2019-06-12

## 2019-06-05 NOTE — TELEPHONE ENCOUNTER
Patient is in [place] for vacation and he has a sinus infection and was wondering if Dr. Clement Palmer could call something into [facility] - phone [phone]. Please give him a call back. This started about 5 days ago, coughing up - yellow, head congestion, no fever. Just feeling bad.

## 2019-06-05 NOTE — TELEPHONE ENCOUNTER
Amoxil sent to pharmacy in [facility] but take as directed with food and take probiotic supplement along with this.

## 2019-06-27 RX ORDER — POTASSIUM CHLORIDE 750 MG/1
TABLET, FILM COATED, EXTENDED RELEASE ORAL
Qty: 90 TABLET | Refills: 2 | Status: SHIPPED | OUTPATIENT
Start: 2019-06-27 | End: 2021-03-04 | Stop reason: SDUPTHER

## 2019-07-01 RX ORDER — IRBESARTAN 300 MG/1
TABLET ORAL
Qty: 90 TABLET | Refills: 2 | Status: SHIPPED | OUTPATIENT
Start: 2019-07-01 | End: 2019-07-05

## 2019-07-05 ENCOUNTER — TELEPHONE (OUTPATIENT)
Dept: FAMILY MEDICINE CLINIC | Age: 69
End: 2019-07-05

## 2019-07-05 RX ORDER — TELMISARTAN 80 MG/1
80 TABLET ORAL DAILY
Qty: 90 TABLET | Refills: 1 | Status: SHIPPED | OUTPATIENT
Start: 2019-07-05 | End: 2020-01-03

## 2019-07-05 NOTE — TELEPHONE ENCOUNTER
[facility] is calling because the medication IRBESARTAN IS ON BACKORDER. What would we like to replace this medication with? Please give [facility] a call back.

## 2019-07-09 RX ORDER — FUROSEMIDE 40 MG/1
TABLET ORAL
Qty: 90 TABLET | Refills: 0 | Status: SHIPPED | OUTPATIENT
Start: 2019-07-09 | End: 2019-10-03 | Stop reason: SDUPTHER

## 2019-08-07 RX ORDER — TIZANIDINE 4 MG/1
TABLET ORAL
Qty: 180 TABLET | Refills: 0 | Status: SHIPPED | OUTPATIENT
Start: 2019-08-07 | End: 2019-11-03 | Stop reason: SDUPTHER

## 2019-08-13 ENCOUNTER — PATIENT MESSAGE (OUTPATIENT)
Dept: FAMILY MEDICINE CLINIC | Age: 69
End: 2019-08-13

## 2019-08-13 NOTE — LETTER
[address] [address]  Phone: [phone]  Fax: [phone]    Antoine Zarate MD        August 14, 2019     Patient: Marissa Agosto   YOB: 1950   Date of Visit: 8/13/2019       To Whom It May Concern: It is my medical opinion that Rob Agosto requires a disability parking placard for the following reasons:  He has limited walking ability due to an arthritic condition. Duration of need: 5 years    If you have any questions or concerns, please don't hesitate to call.     Sincerely,        Antoine Zarate MD

## 2019-08-13 NOTE — TELEPHONE ENCOUNTER
From: Franco Garcia Even  To: Karol Hurd MD  Sent: 2019 3:27 PM EDT  Subject: Non-Urgent Medical Question    Dr. Barbra Sharif, My handicap plaque has  and I would like to get a renewal if its ok with you Still having some trouble with my legs. .    Thank Nupur Marcelo Even

## 2019-08-14 RX ORDER — TAMSULOSIN HYDROCHLORIDE 0.4 MG/1
CAPSULE ORAL
Qty: 90 CAPSULE | Refills: 2 | Status: SHIPPED | OUTPATIENT
Start: 2019-08-14 | End: 2020-05-19

## 2019-08-15 DIAGNOSIS — M47.816 ARTHROPATHY OF LUMBAR FACET JOINT: ICD-10-CM

## 2019-08-15 DIAGNOSIS — M16.10 ARTHRITIS, HIP: ICD-10-CM

## 2019-08-15 DIAGNOSIS — M51.36 DDD (DEGENERATIVE DISC DISEASE), LUMBAR: ICD-10-CM

## 2019-08-15 DIAGNOSIS — M51.37 DEGENERATION OF LUMBAR OR LUMBOSACRAL INTERVERTEBRAL DISC: ICD-10-CM

## 2019-08-15 RX ORDER — METOPROLOL SUCCINATE 100 MG/1
TABLET, EXTENDED RELEASE ORAL
Qty: 90 TABLET | Refills: 0 | Status: SHIPPED | OUTPATIENT
Start: 2019-08-15 | End: 2019-11-17 | Stop reason: SDUPTHER

## 2019-08-15 RX ORDER — GABAPENTIN 800 MG/1
TABLET ORAL
Qty: 180 TABLET | Refills: 0 | Status: SHIPPED | OUTPATIENT
Start: 2019-08-15 | End: 2020-01-27

## 2019-08-16 RX ORDER — TRAZODONE HYDROCHLORIDE 100 MG/1
TABLET ORAL
Qty: 270 TABLET | Refills: 0 | Status: SHIPPED | OUTPATIENT
Start: 2019-08-16 | End: 2020-01-06

## 2019-08-29 ENCOUNTER — OFFICE VISIT (OUTPATIENT)
Dept: SLEEP MEDICINE | Age: 69
End: 2019-08-29
Payer: MEDICARE

## 2019-08-29 VITALS
BODY MASS INDEX: 33.97 KG/M2 | DIASTOLIC BLOOD PRESSURE: 63 MMHG | OXYGEN SATURATION: 97 % | RESPIRATION RATE: 16 BRPM | HEART RATE: 66 BPM | SYSTOLIC BLOOD PRESSURE: 109 MMHG | HEIGHT: 76 IN | WEIGHT: 279 LBS

## 2019-08-29 DIAGNOSIS — Z99.89 DEPENDENCE ON OTHER ENABLING MACHINES AND DEVICES: ICD-10-CM

## 2019-08-29 DIAGNOSIS — Z99.89 OSA ON CPAP: Primary | ICD-10-CM

## 2019-08-29 DIAGNOSIS — G47.33 OSA ON CPAP: Primary | ICD-10-CM

## 2019-08-29 PROCEDURE — 1036F TOBACCO NON-USER: CPT | Performed by: PSYCHIATRY & NEUROLOGY

## 2019-08-29 PROCEDURE — 99213 OFFICE O/P EST LOW 20 MIN: CPT | Performed by: PSYCHIATRY & NEUROLOGY

## 2019-08-29 PROCEDURE — G8417 CALC BMI ABV UP PARAM F/U: HCPCS | Performed by: PSYCHIATRY & NEUROLOGY

## 2019-08-29 PROCEDURE — 3017F COLORECTAL CA SCREEN DOC REV: CPT | Performed by: PSYCHIATRY & NEUROLOGY

## 2019-08-29 PROCEDURE — G8427 DOCREV CUR MEDS BY ELIG CLIN: HCPCS | Performed by: PSYCHIATRY & NEUROLOGY

## 2019-08-29 PROCEDURE — 1123F ACP DISCUSS/DSCN MKR DOCD: CPT | Performed by: PSYCHIATRY & NEUROLOGY

## 2019-08-29 PROCEDURE — 4040F PNEUMOC VAC/ADMIN/RCVD: CPT | Performed by: PSYCHIATRY & NEUROLOGY

## 2019-08-29 ASSESSMENT — SLEEP AND FATIGUE QUESTIONNAIRES
HOW LIKELY ARE YOU TO NOD OFF OR FALL ASLEEP IN A CAR, WHILE STOPPED FOR A FEW MINUTES IN TRAFFIC: 0
HOW LIKELY ARE YOU TO NOD OFF OR FALL ASLEEP WHILE SITTING AND TALKING TO SOMEONE: 0
HOW LIKELY ARE YOU TO NOD OFF OR FALL ASLEEP WHILE LYING DOWN TO REST IN THE AFTERNOON WHEN CIRCUMSTANCES PERMIT: 1
HOW LIKELY ARE YOU TO NOD OFF OR FALL ASLEEP WHILE SITTING AND READING: 0
HOW LIKELY ARE YOU TO NOD OFF OR FALL ASLEEP WHILE WATCHING TV: 1
ESS TOTAL SCORE: 2
HOW LIKELY ARE YOU TO NOD OFF OR FALL ASLEEP WHILE SITTING INACTIVE IN A PUBLIC PLACE: 0
HOW LIKELY ARE YOU TO NOD OFF OR FALL ASLEEP WHILE SITTING QUIETLY AFTER LUNCH WITHOUT ALCOHOL: 0
HOW LIKELY ARE YOU TO NOD OFF OR FALL ASLEEP WHEN YOU ARE A PASSENGER IN A CAR FOR AN HOUR WITHOUT A BREAK: 0

## 2019-08-29 ASSESSMENT — ENCOUNTER SYMPTOMS
CHOKING: 0
APNEA: 0

## 2019-08-29 NOTE — PROGRESS NOTES
MD SMOOTH De Los Santos Board Certified in Sleep Medicine  Certified in [address] [place] [address] Certified in Neurology [address]  Darcy Rockwell 56 911 [address],  [phone]   [address] [place] [address], [address]                      [address]  [address] [facility] [address] [phone]    Subjective:     Patient ID: Delta Agosto is a 78 y.o. male. Chief Complaint   Patient presents with    Follow-up     yearly       HPI:        Delta Agosto is a 78 y.o. male was seen today as annual follow for severe obstructive sleep apnea with apnea hypopnea index of 75.2 with lowest O2 saturation of 67%, patient spent about 100.9 minutes below 90%. Patient is using the PAP machine about 100% of the time, more than 4 hours a nightabout  97 %, in total average of 8:24 hours a night in last 90 days. Currently on PAP at 13 cm, the AHI is only 3.4 events per hour atthis pressure. Patient improved regarding daytime sleepiness and fatigue, wakes up refreshed in the morning. The Patient scored Total score: 2 on [place] Sleepiness Scale ( more than 10 is indicative of daytime sleepiness)   Patient has no problem with PAP pressure or mask. Some morning sleepiness, takes Trazodone 100 mg QHS, Gabapentin 800 mg QHS and Zanaflex 4 mg QHS.     DOT/CDL - N/A        Previous Report(s)Reviewed: historical medical records         Social History     Socioeconomic History    Marital status:      Spouse name: Not on file    Number of children: Not on file    Years of education: Not on file    Highest education level: Not on file   Occupational History    Not on file   Social Needs    Financial resource strain: Not on file    Food insecurity:     Worry: Not on file     Inability: Not on file    Transportation needs:     Medical: Not on file - CNP   KLOR-CON 10 10 MEQ extended release tablet TAKE 1 TABLET BY MOUTH EVERY DAY 6/27/19   SARA Brenner - CNP   oxybutynin (DITROPAN-XL) 10 MG extended release tablet TAKE 1 TABLET BY MOUTH EVERY DAY 5/20/19   SARA Ward CNP   hydrochlorothiazide (HYDRODIURIL) 25 MG tablet TAKE 1 TABLET BY MOUTH EVERY DAY 4/5/19   Lauryn Tobin MD   DULoxetine (CYMBALTA) 60 MG extended release capsule TAKE 1 CAPSULE BY MOUTH DAILY 4/5/19   Lauryn Tobin MD   omeprazole (PRILOSEC) 20 MG delayed release capsule TAKE ONE CAPSULE BY MOUTH EVERY DAY 2/12/19   SARA Ward CNP   L-methylfolate-B6-B12 [facility]) 2-95.374-7-11 MG CAPS capsule Take 1 capsule by mouth 2 times daily 1/31/19   Lauryn Tobin MD   atorvastatin (LIPITOR) 80 MG tablet TAKE 1 TABLET BY MOUTH EVERY DAY 10/16/18   Lauryn Tobin MD   finasteride (PROSCAR) 5 MG tablet TAKE 1 TABLET BY MOUTH EVERY DAY 5/14/18   SARA Brenner CNP   ibuprofen (ADVIL;MOTRIN) 200 MG tablet Take 200 mg by mouth every 6 hours as needed for Pain. Historical Provider, MD   Pseudoephedrine-Guaifenesin [facility] D PO) Take 1 tablet by mouth daily. Historical Provider, MD   vitamin D-3 (CHOLECALCIFEROL) 5000 UNITS TABS Take 5,000 Units by mouth daily.     Historical Provider, MD       Allergies as of 08/29/2019 - Review Complete 08/29/2019   Allergen Reaction Noted    Ambien [zolpidem tartrate]  12/03/2013       Patient Active Problem List   Diagnosis    Hypercholesteremia    Hypertension    Insomnia    DDD (degenerative disc disease), lumbar    Arthritis, hip    BPH (benign prostatic hyperplasia)    Peyronie disease    Depression    Arthropathy of lumbar facet joint    Degeneration of lumbar or lumbosacral intervertebral disc    OAB (overactive bladder)    Liver cyst    MATIAS (obstructive sleep apnea)       Past Medical History:   Diagnosis Date    Arthritis, hip 6/29/2011    Arthropathy of lumbar facet joint 7/23/2013    BPH (benign prostatic hyperplasia) 6/29/2011    Current smoker     DDD (degenerative disc disease), lumbar 6/29/2011    Degeneration of lumbar or lumbosacral intervertebral disc 9/24/2013    Depression 11/11/2011    Hepatic cyst     Hypercholesteremia 6/29/2011    Hyperlipidemia     Hypertension     Insomnia     Painful respiration     Peyronie disease 6/29/2011    Sleep apnea        Past Surgical History:   Procedure Laterality Date    COLONOSCOPY  2008    normal,    COLONOSCOPY  2018    [facility]    JOINT REPLACEMENT Left 2000    [facility]    TONSILLECTOMY AND ADENOIDECTOMY         Family History   Problem Relation Age of Onset    Cancer Father        Review of Systems   Respiratory: Negative for apnea and choking. Cardiovascular: Negative for leg swelling. Genitourinary: Negative for frequency. Neurological: Negative for headaches. Objective:     Vitals:  Weight BMI Neck circumference    Wt Readings from Last 3 Encounters:   08/29/19 279 lb (126.6 kg)   04/05/19 272 lb (123.4 kg)   09/20/18 277 lb (125.6 kg)    Body mass index is 33.96 kg/m². BP HR SaO2   BP Readings from Last 3 Encounters:   08/29/19 109/63   04/05/19 120/70   09/20/18 122/74    Pulse Readings from Last 3 Encounters:   08/29/19 66   04/05/19 60   09/20/18 66    SpO2 Readings from Last 3 Encounters:   08/29/19 97%   04/05/19 98%   08/28/18 97%      Themandibular molar Class :   [x]1 []2 []3      Mallampati I Airway Classification:   []1 []2 []3 [x]4      Physical Exam   Constitutional: No distress. HENT:   Nose: Nose normal.   Eyes: EOM are normal.   Neck: Neck supple. Cardiovascular: Normal rate, regular rhythm and normal heart sounds. Pulmonary/Chest: Effort normal and breath sounds normal. No respiratory distress. Musculoskeletal: Normal range of motion. He exhibits no edema. Neurological: He is alert. Skin: Skin is warm. Psychiatric: He has a normal mood and affect.    Nursing

## 2019-10-04 RX ORDER — FUROSEMIDE 40 MG/1
TABLET ORAL
Qty: 90 TABLET | Refills: 0 | Status: SHIPPED | OUTPATIENT
Start: 2019-10-04 | End: 2021-01-22 | Stop reason: SDUPTHER

## 2019-10-04 RX ORDER — ATORVASTATIN CALCIUM 80 MG/1
TABLET, FILM COATED ORAL
Qty: 90 TABLET | Refills: 3 | Status: SHIPPED | OUTPATIENT
Start: 2019-10-04 | End: 2020-10-01 | Stop reason: SDUPTHER

## 2019-10-14 ENCOUNTER — TELEPHONE (OUTPATIENT)
Dept: FAMILY MEDICINE CLINIC | Age: 69
End: 2019-10-14

## 2019-10-15 ENCOUNTER — OFFICE VISIT (OUTPATIENT)
Dept: FAMILY MEDICINE CLINIC | Age: 69
End: 2019-10-15
Payer: MEDICARE

## 2019-10-15 VITALS
HEIGHT: 76 IN | DIASTOLIC BLOOD PRESSURE: 68 MMHG | WEIGHT: 278 LBS | BODY MASS INDEX: 33.85 KG/M2 | RESPIRATION RATE: 14 BRPM | HEART RATE: 44 BPM | SYSTOLIC BLOOD PRESSURE: 110 MMHG

## 2019-10-15 DIAGNOSIS — R00.1 BRADYCARDIA: Primary | ICD-10-CM

## 2019-10-15 DIAGNOSIS — R35.1 BENIGN PROSTATIC HYPERPLASIA WITH NOCTURIA: ICD-10-CM

## 2019-10-15 DIAGNOSIS — N40.1 BENIGN PROSTATIC HYPERPLASIA WITH NOCTURIA: ICD-10-CM

## 2019-10-15 DIAGNOSIS — R42 DIZZY: ICD-10-CM

## 2019-10-15 DIAGNOSIS — R06.02 SOB (SHORTNESS OF BREATH): ICD-10-CM

## 2019-10-15 LAB
A/G RATIO: 2.4 (ref 1.1–2.2)
ALBUMIN SERPL-MCNC: 4.4 G/DL (ref 3.4–5)
ALP BLD-CCNC: 79 U/L (ref 40–129)
ALT SERPL-CCNC: 27 U/L (ref 10–40)
ANION GAP SERPL CALCULATED.3IONS-SCNC: 14 MMOL/L (ref 3–16)
AST SERPL-CCNC: 17 U/L (ref 15–37)
BASOPHILS ABSOLUTE: 0 K/UL (ref 0–0.2)
BASOPHILS RELATIVE PERCENT: 0.6 %
BILIRUB SERPL-MCNC: 0.7 MG/DL (ref 0–1)
BUN BLDV-MCNC: 17 MG/DL (ref 7–20)
CALCIUM SERPL-MCNC: 9.6 MG/DL (ref 8.3–10.6)
CHLORIDE BLD-SCNC: 100 MMOL/L (ref 99–110)
CO2: 26 MMOL/L (ref 21–32)
CREAT SERPL-MCNC: 1 MG/DL (ref 0.8–1.3)
EOSINOPHILS ABSOLUTE: 0.1 K/UL (ref 0–0.6)
EOSINOPHILS RELATIVE PERCENT: 2 %
GFR AFRICAN AMERICAN: >60
GFR NON-AFRICAN AMERICAN: >60
GLOBULIN: 1.8 G/DL
GLUCOSE BLD-MCNC: 111 MG/DL (ref 70–99)
HCT VFR BLD CALC: 39.5 % (ref 40.5–52.5)
HEMOGLOBIN: 13.3 G/DL (ref 13.5–17.5)
LYMPHOCYTES ABSOLUTE: 1 K/UL (ref 1–5.1)
LYMPHOCYTES RELATIVE PERCENT: 15.9 %
MCH RBC QN AUTO: 32.6 PG (ref 26–34)
MCHC RBC AUTO-ENTMCNC: 33.8 G/DL (ref 31–36)
MCV RBC AUTO: 96.3 FL (ref 80–100)
MONOCYTES ABSOLUTE: 0.4 K/UL (ref 0–1.3)
MONOCYTES RELATIVE PERCENT: 6.3 %
NEUTROPHILS ABSOLUTE: 4.8 K/UL (ref 1.7–7.7)
NEUTROPHILS RELATIVE PERCENT: 75.2 %
PDW BLD-RTO: 13.7 % (ref 12.4–15.4)
PLATELET # BLD: 176 K/UL (ref 135–450)
PMV BLD AUTO: 9.8 FL (ref 5–10.5)
POTASSIUM SERPL-SCNC: 4.4 MMOL/L (ref 3.5–5.1)
PRO-BNP: 639 PG/ML (ref 0–124)
PROSTATE SPECIFIC ANTIGEN: 3.76 NG/ML (ref 0–4)
RBC # BLD: 4.1 M/UL (ref 4.2–5.9)
REASON FOR REJECTION: NORMAL
REJECTED TEST: NORMAL
SODIUM BLD-SCNC: 140 MMOL/L (ref 136–145)
TOTAL PROTEIN: 6.2 G/DL (ref 6.4–8.2)
WBC # BLD: 6.4 K/UL (ref 4–11)

## 2019-10-15 PROCEDURE — 36415 COLL VENOUS BLD VENIPUNCTURE: CPT | Performed by: FAMILY MEDICINE

## 2019-10-15 PROCEDURE — 1123F ACP DISCUSS/DSCN MKR DOCD: CPT | Performed by: FAMILY MEDICINE

## 2019-10-15 PROCEDURE — 93000 ELECTROCARDIOGRAM COMPLETE: CPT | Performed by: FAMILY MEDICINE

## 2019-10-15 PROCEDURE — G8417 CALC BMI ABV UP PARAM F/U: HCPCS | Performed by: FAMILY MEDICINE

## 2019-10-15 PROCEDURE — G8482 FLU IMMUNIZE ORDER/ADMIN: HCPCS | Performed by: FAMILY MEDICINE

## 2019-10-15 PROCEDURE — 99214 OFFICE O/P EST MOD 30 MIN: CPT | Performed by: FAMILY MEDICINE

## 2019-10-15 PROCEDURE — 1036F TOBACCO NON-USER: CPT | Performed by: FAMILY MEDICINE

## 2019-10-15 PROCEDURE — 3017F COLORECTAL CA SCREEN DOC REV: CPT | Performed by: FAMILY MEDICINE

## 2019-10-15 PROCEDURE — 4040F PNEUMOC VAC/ADMIN/RCVD: CPT | Performed by: FAMILY MEDICINE

## 2019-10-15 PROCEDURE — G8427 DOCREV CUR MEDS BY ELIG CLIN: HCPCS | Performed by: FAMILY MEDICINE

## 2019-10-17 DIAGNOSIS — R79.89 ELEVATED BRAIN NATRIURETIC PEPTIDE (BNP) LEVEL: ICD-10-CM

## 2019-10-17 DIAGNOSIS — R06.02 SOBOE (SHORTNESS OF BREATH ON EXERTION): Primary | ICD-10-CM

## 2019-10-28 ENCOUNTER — OFFICE VISIT (OUTPATIENT)
Dept: FAMILY MEDICINE CLINIC | Age: 69
End: 2019-10-28
Payer: MEDICARE

## 2019-10-28 VITALS
TEMPERATURE: 97.4 F | HEART RATE: 68 BPM | DIASTOLIC BLOOD PRESSURE: 84 MMHG | BODY MASS INDEX: 32.54 KG/M2 | RESPIRATION RATE: 14 BRPM | HEIGHT: 77 IN | SYSTOLIC BLOOD PRESSURE: 132 MMHG | WEIGHT: 275.6 LBS

## 2019-10-28 DIAGNOSIS — L30.9 DERMATITIS: Primary | ICD-10-CM

## 2019-10-28 PROCEDURE — 3017F COLORECTAL CA SCREEN DOC REV: CPT | Performed by: FAMILY MEDICINE

## 2019-10-28 PROCEDURE — 1036F TOBACCO NON-USER: CPT | Performed by: FAMILY MEDICINE

## 2019-10-28 PROCEDURE — G8417 CALC BMI ABV UP PARAM F/U: HCPCS | Performed by: FAMILY MEDICINE

## 2019-10-28 PROCEDURE — G8482 FLU IMMUNIZE ORDER/ADMIN: HCPCS | Performed by: FAMILY MEDICINE

## 2019-10-28 PROCEDURE — 96372 THER/PROPH/DIAG INJ SC/IM: CPT | Performed by: FAMILY MEDICINE

## 2019-10-28 PROCEDURE — 1123F ACP DISCUSS/DSCN MKR DOCD: CPT | Performed by: FAMILY MEDICINE

## 2019-10-28 PROCEDURE — G8427 DOCREV CUR MEDS BY ELIG CLIN: HCPCS | Performed by: FAMILY MEDICINE

## 2019-10-28 PROCEDURE — 4040F PNEUMOC VAC/ADMIN/RCVD: CPT | Performed by: FAMILY MEDICINE

## 2019-10-28 PROCEDURE — 99213 OFFICE O/P EST LOW 20 MIN: CPT | Performed by: FAMILY MEDICINE

## 2019-10-28 RX ORDER — AMOXICILLIN AND CLAVULANATE POTASSIUM 875; 125 MG/1; MG/1
TABLET, FILM COATED ORAL
COMMUNITY
Start: 2019-10-26 | End: 2020-06-01

## 2019-10-28 RX ORDER — OMEPRAZOLE 20 MG/1
CAPSULE, DELAYED RELEASE ORAL
Qty: 90 CAPSULE | Refills: 1 | Status: SHIPPED | OUTPATIENT
Start: 2019-10-28 | End: 2020-04-22 | Stop reason: SDUPTHER

## 2019-10-28 ASSESSMENT — ENCOUNTER SYMPTOMS
TROUBLE SWALLOWING: 0
COLOR CHANGE: 1
COUGH: 0
WHEEZING: 0
STRIDOR: 0
SHORTNESS OF BREATH: 0

## 2019-11-04 RX ORDER — TIZANIDINE 4 MG/1
TABLET ORAL
Qty: 180 TABLET | Refills: 0 | Status: SHIPPED | OUTPATIENT
Start: 2019-11-04 | End: 2020-02-25

## 2019-11-18 RX ORDER — OXYBUTYNIN CHLORIDE 10 MG/1
TABLET, EXTENDED RELEASE ORAL
Qty: 90 TABLET | Refills: 3 | Status: SHIPPED | OUTPATIENT
Start: 2019-11-18 | End: 2020-11-11 | Stop reason: SDUPTHER

## 2019-11-18 RX ORDER — METOPROLOL SUCCINATE 100 MG/1
TABLET, EXTENDED RELEASE ORAL
Qty: 90 TABLET | Refills: 0 | Status: SHIPPED | OUTPATIENT
Start: 2019-11-18 | End: 2020-02-17 | Stop reason: DRUGHIGH

## 2019-11-20 RX ORDER — FINASTERIDE 5 MG/1
TABLET, FILM COATED ORAL
Qty: 90 TABLET | Refills: 3 | Status: SHIPPED | OUTPATIENT
Start: 2019-11-20 | End: 2020-11-11 | Stop reason: SDUPTHER

## 2020-01-03 RX ORDER — TELMISARTAN 80 MG/1
TABLET ORAL
Qty: 90 TABLET | Refills: 1 | Status: SHIPPED | OUTPATIENT
Start: 2020-01-03 | End: 2020-06-03 | Stop reason: SDUPTHER

## 2020-01-06 RX ORDER — TRAZODONE HYDROCHLORIDE 100 MG/1
TABLET ORAL
Qty: 270 TABLET | Refills: 0 | Status: SHIPPED | OUTPATIENT
Start: 2020-01-06 | End: 2020-04-06

## 2020-01-23 RX ORDER — L-METHYLFOLATE-ALGAE-VIT B12-B6 CAP 3-90.314-2-35 MG 3-90.314-2-35 MG
CAP ORAL
Qty: 180 CAPSULE | Refills: 3 | Status: SHIPPED | OUTPATIENT
Start: 2020-01-23 | End: 2020-02-21 | Stop reason: SDUPTHER

## 2020-01-27 RX ORDER — GABAPENTIN 800 MG/1
TABLET ORAL
Qty: 180 TABLET | Refills: 1 | Status: SHIPPED | OUTPATIENT
Start: 2020-01-27 | End: 2020-05-28

## 2020-02-21 RX ORDER — L-METHYLFOLATE-ALGAE-VIT B12-B6 CAP 3-90.314-2-35 MG 3-90.314-2-35 MG
1 CAP ORAL 2 TIMES DAILY
Qty: 180 CAPSULE | Refills: 3 | Status: SHIPPED | OUTPATIENT
Start: 2020-02-21 | End: 2021-03-11

## 2020-02-25 RX ORDER — TIZANIDINE 4 MG/1
TABLET ORAL
Qty: 180 TABLET | Refills: 1 | Status: SHIPPED | OUTPATIENT
Start: 2020-02-25 | End: 2020-08-24

## 2020-02-26 ENCOUNTER — NURSE ONLY (OUTPATIENT)
Dept: FAMILY MEDICINE CLINIC | Age: 70
End: 2020-02-26
Payer: MEDICARE

## 2020-02-26 LAB
A/G RATIO: 2.4 (ref 1.1–2.2)
ALBUMIN SERPL-MCNC: 4.4 G/DL (ref 3.4–5)
ALP BLD-CCNC: 84 U/L (ref 40–129)
ALT SERPL-CCNC: 22 U/L (ref 10–40)
ANION GAP SERPL CALCULATED.3IONS-SCNC: 14 MMOL/L (ref 3–16)
AST SERPL-CCNC: 17 U/L (ref 15–37)
BASOPHILS ABSOLUTE: 0.1 K/UL (ref 0–0.2)
BASOPHILS RELATIVE PERCENT: 0.9 %
BILIRUB SERPL-MCNC: 0.4 MG/DL (ref 0–1)
BUN BLDV-MCNC: 24 MG/DL (ref 7–20)
CALCIUM SERPL-MCNC: 9.5 MG/DL (ref 8.3–10.6)
CHLORIDE BLD-SCNC: 102 MMOL/L (ref 99–110)
CHOLESTEROL, TOTAL: 203 MG/DL (ref 0–199)
CO2: 26 MMOL/L (ref 21–32)
CREAT SERPL-MCNC: 1.1 MG/DL (ref 0.8–1.3)
D DIMER: 204 NG/ML DDU (ref 0–229)
EOSINOPHILS ABSOLUTE: 0.1 K/UL (ref 0–0.6)
EOSINOPHILS RELATIVE PERCENT: 2.1 %
FOLATE: >20 NG/ML (ref 4.78–24.2)
GFR AFRICAN AMERICAN: >60
GFR NON-AFRICAN AMERICAN: >60
GLOBULIN: 1.8 G/DL
GLUCOSE BLD-MCNC: 110 MG/DL (ref 70–99)
HCT VFR BLD CALC: 39.5 % (ref 40.5–52.5)
HDLC SERPL-MCNC: 51 MG/DL (ref 40–60)
HEMOGLOBIN: 13.6 G/DL (ref 13.5–17.5)
LDL CHOLESTEROL CALCULATED: 114 MG/DL
LYMPHOCYTES ABSOLUTE: 1.3 K/UL (ref 1–5.1)
LYMPHOCYTES RELATIVE PERCENT: 21 %
MCH RBC QN AUTO: 33.5 PG (ref 26–34)
MCHC RBC AUTO-ENTMCNC: 34.5 G/DL (ref 31–36)
MCV RBC AUTO: 97.1 FL (ref 80–100)
MONOCYTES ABSOLUTE: 0.4 K/UL (ref 0–1.3)
MONOCYTES RELATIVE PERCENT: 5.9 %
NEUTROPHILS ABSOLUTE: 4.5 K/UL (ref 1.7–7.7)
NEUTROPHILS RELATIVE PERCENT: 70.1 %
PDW BLD-RTO: 13.1 % (ref 12.4–15.4)
PLATELET # BLD: 192 K/UL (ref 135–450)
PMV BLD AUTO: 9.4 FL (ref 5–10.5)
POTASSIUM SERPL-SCNC: 3.9 MMOL/L (ref 3.5–5.1)
PROSTATE SPECIFIC ANTIGEN: 3.64 NG/ML (ref 0–4)
RBC # BLD: 4.07 M/UL (ref 4.2–5.9)
SODIUM BLD-SCNC: 142 MMOL/L (ref 136–145)
TOTAL PROTEIN: 6.2 G/DL (ref 6.4–8.2)
TRIGL SERPL-MCNC: 192 MG/DL (ref 0–150)
VITAMIN B-12: 1063 PG/ML (ref 211–911)
VLDLC SERPL CALC-MCNC: 38 MG/DL
WBC # BLD: 6.3 K/UL (ref 4–11)

## 2020-02-26 PROCEDURE — 36415 COLL VENOUS BLD VENIPUNCTURE: CPT | Performed by: FAMILY MEDICINE

## 2020-03-31 ENCOUNTER — TELEMEDICINE (OUTPATIENT)
Dept: FAMILY MEDICINE CLINIC | Age: 70
End: 2020-03-31
Payer: MEDICARE

## 2020-03-31 PROCEDURE — 3017F COLORECTAL CA SCREEN DOC REV: CPT | Performed by: FAMILY MEDICINE

## 2020-03-31 PROCEDURE — 1123F ACP DISCUSS/DSCN MKR DOCD: CPT | Performed by: FAMILY MEDICINE

## 2020-03-31 PROCEDURE — G8428 CUR MEDS NOT DOCUMENT: HCPCS | Performed by: FAMILY MEDICINE

## 2020-03-31 PROCEDURE — 99213 OFFICE O/P EST LOW 20 MIN: CPT | Performed by: FAMILY MEDICINE

## 2020-03-31 PROCEDURE — 4040F PNEUMOC VAC/ADMIN/RCVD: CPT | Performed by: FAMILY MEDICINE

## 2020-03-31 RX ORDER — METOPROLOL SUCCINATE 100 MG/1
150 TABLET, EXTENDED RELEASE ORAL DAILY
Qty: 135 TABLET | Refills: 1 | Status: SHIPPED | OUTPATIENT
Start: 2020-03-31 | End: 2020-07-31

## 2020-03-31 RX ORDER — HYDROCHLOROTHIAZIDE 25 MG/1
TABLET ORAL
Qty: 90 TABLET | Refills: 3 | Status: SHIPPED | OUTPATIENT
Start: 2020-03-31 | End: 2021-04-15

## 2020-03-31 RX ORDER — DULOXETIN HYDROCHLORIDE 60 MG/1
CAPSULE, DELAYED RELEASE ORAL
Qty: 90 CAPSULE | Refills: 3 | Status: SHIPPED | OUTPATIENT
Start: 2020-03-31 | End: 2021-04-15

## 2020-03-31 NOTE — PROGRESS NOTES
amoxicillin-clavulanate (AUGMENTIN) 875-125 MG per tablet   Historical Provider, MD   furosemide (LASIX) 40 MG tablet TAKE 1 TABLET BY MOUTH EVERY DAY  SARA Amaro CNP   atorvastatin (LIPITOR) 80 MG tablet TAKE 1 TABLET BY MOUTH EVERY DAY  SARA Amaro CNP   tamsulosin (FLOMAX) 0.4 MG capsule TAKE 1 CAPSULE BY MOUTH EVERY DAY  Lysbeth Turner, MD   KLOR-CON 10 10 MEQ extended release tablet TAKE 1 TABLET BY MOUTH EVERY DAY  SARA Amaro CNP   ibuprofen (ADVIL;MOTRIN) 200 MG tablet Take 200 mg by mouth every 6 hours as needed for Pain. Historical Provider, MD   Pseudoephedrine-Guaifenesin [facility] D PO) Take 1 tablet by mouth daily. Historical Provider, MD   vitamin D-3 (CHOLECALCIFEROL) 5000 UNITS TABS Take 5,000 Units by mouth daily. Historical Provider, MD       Social History     Tobacco Use    Smoking status: Former Smoker     Packs/day: 2.00     Years: 8.00     Pack years: 16.00     Last attempt to quit: 1974     Years since quittin.2    Smokeless tobacco: Never Used    Tobacco comment: quit smoking 40 years ago   Substance Use Topics    Alcohol use: Yes    Drug use: No          PHYSICAL EXAMINATION:  [ INSTRUCTIONS:  \"[x]\" Indicates a positive item  \"[]\" Indicates a negative item  -- DELETE ALL ITEMS NOT EXAMINED]  Vital Signs: (As obtained by patient/caregiver or practitioner observation)    Blood pressure-  Heart rate-    Respiratory rate-    Temperature-  Pulse oximetry-     Constitutional: [x] Appears well-developed and well-nourished [x] No apparent distress      [] Abnormal-   Mental status  [x] Alert and awake  [x] Oriented to person/place/time [x]Able to follow commands      Eyes:  EOM    []  Normal  [] Abnormal-  Sclera  [x]  Normal  [] Abnormal -         Discharge []  None visible  [] Abnormal -    HENT:   [x] Normocephalic, atraumatic.   [] Abnormal   [] Mouth/Throat: Mucous membranes are moist.     External Ears [x] Normal  [] Abnormal-     Neck: [x] No of care for an established patient. Services were provided through a video synchronous discussion virtually to substitute for in-person clinic visit.

## 2020-04-03 ENCOUNTER — TELEPHONE (OUTPATIENT)
Dept: SLEEP MEDICINE | Age: 70
End: 2020-04-03

## 2020-04-06 RX ORDER — TRAZODONE HYDROCHLORIDE 100 MG/1
TABLET ORAL
Qty: 270 TABLET | Refills: 0 | Status: SHIPPED | OUTPATIENT
Start: 2020-04-06 | End: 2020-07-06

## 2020-04-22 RX ORDER — OMEPRAZOLE 20 MG/1
CAPSULE, DELAYED RELEASE ORAL
Qty: 90 CAPSULE | Refills: 1 | Status: SHIPPED | OUTPATIENT
Start: 2020-04-22 | End: 2020-10-19

## 2020-05-19 RX ORDER — TAMSULOSIN HYDROCHLORIDE 0.4 MG/1
CAPSULE ORAL
Qty: 90 CAPSULE | Refills: 2 | Status: SHIPPED | OUTPATIENT
Start: 2020-05-19 | End: 2021-02-19

## 2020-05-26 ENCOUNTER — TELEPHONE (OUTPATIENT)
Dept: FAMILY MEDICINE CLINIC | Age: 70
End: 2020-05-26

## 2020-05-26 NOTE — TELEPHONE ENCOUNTER
What surgery?   Shouldn't surgeon determine which labs are required for preop clearance - if surgeon can place - may be able to be done at [facility] including ekg

## 2020-05-27 NOTE — TELEPHONE ENCOUNTER
CALLED AND SPOKE TO DR. Vish Olvera OFFICE TO FIND OUT WHAT IS NEEDED AWAITING A CALL BACK PREOP IS ON June 1ST TO HAVE SURGERY ON FINGER. Richard Goss

## 2020-05-27 NOTE — TELEPHONE ENCOUNTER
I PENDED ORDER FOR EKG ALSO NEEDS HEMOGLOBIN AND HEMATOCRIT I THINK I ORDERED THIS RIGHT. Evelia Prim

## 2020-05-28 RX ORDER — GABAPENTIN 800 MG/1
800 TABLET ORAL 3 TIMES DAILY
Qty: 90 TABLET | Refills: 2 | Status: SHIPPED | OUTPATIENT
Start: 2020-05-28 | End: 2020-10-28 | Stop reason: SDUPTHER

## 2020-06-01 ENCOUNTER — OFFICE VISIT (OUTPATIENT)
Dept: FAMILY MEDICINE CLINIC | Age: 70
End: 2020-06-01
Payer: MEDICARE

## 2020-06-01 VITALS
DIASTOLIC BLOOD PRESSURE: 80 MMHG | WEIGHT: 276 LBS | OXYGEN SATURATION: 98 % | TEMPERATURE: 98 F | HEIGHT: 76 IN | HEART RATE: 67 BPM | SYSTOLIC BLOOD PRESSURE: 122 MMHG | BODY MASS INDEX: 33.61 KG/M2

## 2020-06-01 DIAGNOSIS — I10 ESSENTIAL HYPERTENSION: ICD-10-CM

## 2020-06-01 DIAGNOSIS — D64.9 MILD ANEMIA: ICD-10-CM

## 2020-06-01 LAB
A/G RATIO: 2.2 (ref 1.1–2.2)
ALBUMIN SERPL-MCNC: 4.4 G/DL (ref 3.4–5)
ALP BLD-CCNC: 88 U/L (ref 40–129)
ALT SERPL-CCNC: 29 U/L (ref 10–40)
ANION GAP SERPL CALCULATED.3IONS-SCNC: 15 MMOL/L (ref 3–16)
AST SERPL-CCNC: 33 U/L (ref 15–37)
BASOPHILS ABSOLUTE: 0 K/UL (ref 0–0.2)
BASOPHILS RELATIVE PERCENT: 0.3 %
BILIRUB SERPL-MCNC: 0.8 MG/DL (ref 0–1)
BUN BLDV-MCNC: 22 MG/DL (ref 7–20)
CALCIUM SERPL-MCNC: 9.5 MG/DL (ref 8.3–10.6)
CHLORIDE BLD-SCNC: 97 MMOL/L (ref 99–110)
CO2: 25 MMOL/L (ref 21–32)
CREAT SERPL-MCNC: 1.1 MG/DL (ref 0.8–1.3)
EOSINOPHILS ABSOLUTE: 0 K/UL (ref 0–0.6)
EOSINOPHILS RELATIVE PERCENT: 0.4 %
GFR AFRICAN AMERICAN: >60
GFR NON-AFRICAN AMERICAN: >60
GLOBULIN: 2 G/DL
GLUCOSE BLD-MCNC: 128 MG/DL (ref 70–99)
HCT VFR BLD CALC: 39.4 % (ref 40.5–52.5)
HEMOGLOBIN: 13.8 G/DL (ref 13.5–17.5)
LYMPHOCYTES ABSOLUTE: 0.7 K/UL (ref 1–5.1)
LYMPHOCYTES RELATIVE PERCENT: 9.2 %
MCH RBC QN AUTO: 34 PG (ref 26–34)
MCHC RBC AUTO-ENTMCNC: 35 G/DL (ref 31–36)
MCV RBC AUTO: 97.2 FL (ref 80–100)
MONOCYTES ABSOLUTE: 0.4 K/UL (ref 0–1.3)
MONOCYTES RELATIVE PERCENT: 5.3 %
NEUTROPHILS ABSOLUTE: 6.8 K/UL (ref 1.7–7.7)
NEUTROPHILS RELATIVE PERCENT: 84.8 %
PDW BLD-RTO: 12.9 % (ref 12.4–15.4)
PLATELET # BLD: 209 K/UL (ref 135–450)
PMV BLD AUTO: 9.3 FL (ref 5–10.5)
POTASSIUM SERPL-SCNC: 4.4 MMOL/L (ref 3.5–5.1)
RBC # BLD: 4.05 M/UL (ref 4.2–5.9)
SODIUM BLD-SCNC: 137 MMOL/L (ref 136–145)
TOTAL PROTEIN: 6.4 G/DL (ref 6.4–8.2)
WBC # BLD: 8 K/UL (ref 4–11)

## 2020-06-01 PROCEDURE — 93000 ELECTROCARDIOGRAM COMPLETE: CPT | Performed by: FAMILY MEDICINE

## 2020-06-01 PROCEDURE — G8427 DOCREV CUR MEDS BY ELIG CLIN: HCPCS | Performed by: INTERNAL MEDICINE

## 2020-06-01 PROCEDURE — 1123F ACP DISCUSS/DSCN MKR DOCD: CPT | Performed by: INTERNAL MEDICINE

## 2020-06-01 PROCEDURE — 1036F TOBACCO NON-USER: CPT | Performed by: INTERNAL MEDICINE

## 2020-06-01 PROCEDURE — 99214 OFFICE O/P EST MOD 30 MIN: CPT | Performed by: INTERNAL MEDICINE

## 2020-06-01 PROCEDURE — 4040F PNEUMOC VAC/ADMIN/RCVD: CPT | Performed by: INTERNAL MEDICINE

## 2020-06-01 PROCEDURE — G8417 CALC BMI ABV UP PARAM F/U: HCPCS | Performed by: INTERNAL MEDICINE

## 2020-06-01 PROCEDURE — 3017F COLORECTAL CA SCREEN DOC REV: CPT | Performed by: INTERNAL MEDICINE

## 2020-06-01 ASSESSMENT — ENCOUNTER SYMPTOMS
BACK PAIN: 1
SHORTNESS OF BREATH: 0
APNEA: 1
GASTROINTESTINAL NEGATIVE: 1

## 2020-06-01 NOTE — PROGRESS NOTES
Subjective:      Patient ID: Kay Agosto is a 69 y.o. male. Patient presents with:  Pre-op Exam: pre op for right 5th finger release. 6-5-20, at [place], dr Cesar Adrian , need ekg, labs, fax [phone], [phone]. Dupuytren's contraction deformity of rt 5th finger. Kay Agosto is a 69 y.o. male with the following history as recorded in [facility]:  Patient Active Problem List    MATIAS (obstructive sleep apnea)      Liver cyst         Date Noted: 12/14/2017      OAB (overactive bladder)         Date Noted: 08/31/2016      Degeneration of lumbar or lumbosacral intervertebral disc         Date Noted: 09/24/2013      Arthropathy of lumbar facet joint         Date Noted: 07/23/2013      Depression         Date Noted: 11/11/2011      Hypercholesteremia         Date Noted: 06/29/2011      Hypertension         Date Noted: 06/29/2011      Insomnia         Date Noted: 06/29/2011      DDD (degenerative disc disease), lumbar         Date Noted: 06/29/2011      Arthritis, hip         Date Noted: 06/29/2011      BPH (benign prostatic hyperplasia)         Date Noted: 06/29/2011      Peyronie disease         Date Noted: 06/29/2011      Current Outpatient Medications:  gabapentin (NEURONTIN) 800 MG tablet, Take 1 tablet by mouth 3 times daily for 90 days. , Disp: 90 tablet, Rfl: 2  LORazepam (ATIVAN) 0.5 MG tablet, Take 1 tablet by mouth 2 times daily as needed for Anxiety for up to 30 days. , Disp: 30 tablet, Rfl: 0  tamsulosin (FLOMAX) 0.4 MG capsule, TAKE 1 CAPSULE BY MOUTH EVERY DAY, Disp: 90 capsule, Rfl: 2  omeprazole (PRILOSEC) 20 MG delayed release capsule, TAKE 1 CAPSULE BY MOUTH EVERY DAY, Disp: 90 capsule, Rfl: 1  traZODone (DESYREL) 100 MG tablet, TAKE 2 TO 3 TABLETS BY MOUTH NIGHTLY., Disp: 270 tablet, Rfl: 0  DULoxetine (CYMBALTA) 60 MG extended release capsule, TAKE 1 CAPSULE BY MOUTH EVERY DAY, Disp: 90 capsule, Rfl: 3  hydroCHLOROthiazide (HYDRODIURIL) 25 MG tablet, TAKE 1 TABLET BY MOUTH EVERY DAY, Disp: 90 tablet, Rfl: Hypertension  No date: Insomnia  No date: Liver disease  No date: Painful respiration  2011: Peyronie disease  No date: Sleep apnea  Past Surgical History:  2008: COLONOSCOPY      Comment:  normal,  2018: COLONOSCOPY      Comment:  Jaci  2000: JOINT REPLACEMENT; Left      Comment:  [facility]  No date: TONSILLECTOMY AND ADENOIDECTOMY  Review of patient's family history indicates:  Problem: Cancer      Relation: Father          Age of Onset: (Not Specified)    Social History    Tobacco Use      Smoking status: Former Smoker        Packs/day: 2.00        Years: 8.00        Pack years: 12        Quit date:         Years since quittin.4      Smokeless tobacco: Never Used      Tobacco comment: quit smoking 40 years ago    Alcohol use: Yes      Alcohol/week: 28.0 standard drinks      Types: 28 Shots of liquor per week        Review of Systems   Constitutional: Negative for chills. HENT: Negative. Eyes: Negative for visual disturbance. Respiratory: Positive for apnea. Negative for shortness of breath. Cardiovascular: Negative. Gastrointestinal: Negative. Genitourinary:        Dx of BPH. Musculoskeletal: Positive for arthralgias, back pain and myalgias. Rt 5th finger deformity. Skin: Negative. Neurological: Positive for numbness. Psychiatric/Behavioral: Positive for dysphoric mood and sleep disturbance. Objective:   Physical Exam  Constitutional:       Appearance: He is well-developed. HENT:      Head: Normocephalic and atraumatic. Right Ear: External ear normal.      Left Ear: External ear normal.      Nose: Nose normal.   Eyes:      General: No scleral icterus. Right eye: No discharge. Left eye: No discharge. Conjunctiva/sclera: Conjunctivae normal.   Neck:      Musculoskeletal: Normal range of motion and neck supple. Thyroid: No thyromegaly. Vascular: No JVD. Trachea: No tracheal deviation.

## 2020-06-03 RX ORDER — TELMISARTAN 80 MG/1
TABLET ORAL
Qty: 90 TABLET | Refills: 1 | Status: SHIPPED | OUTPATIENT
Start: 2020-06-03 | End: 2020-12-18

## 2020-06-29 RX ORDER — LORAZEPAM 0.5 MG/1
0.5 TABLET ORAL 2 TIMES DAILY PRN
Qty: 30 TABLET | Refills: 0 | Status: SHIPPED | OUTPATIENT
Start: 2020-06-29 | End: 2020-08-07 | Stop reason: SDUPTHER

## 2020-07-06 RX ORDER — TRAZODONE HYDROCHLORIDE 100 MG/1
TABLET ORAL
Qty: 270 TABLET | Refills: 1 | Status: SHIPPED | OUTPATIENT
Start: 2020-07-06 | End: 2021-01-08

## 2020-08-11 ENCOUNTER — VIRTUAL VISIT (OUTPATIENT)
Dept: FAMILY MEDICINE CLINIC | Age: 70
End: 2020-08-11
Payer: MEDICARE

## 2020-08-11 PROCEDURE — G8417 CALC BMI ABV UP PARAM F/U: HCPCS | Performed by: FAMILY MEDICINE

## 2020-08-11 PROCEDURE — 1123F ACP DISCUSS/DSCN MKR DOCD: CPT | Performed by: FAMILY MEDICINE

## 2020-08-11 PROCEDURE — G8427 DOCREV CUR MEDS BY ELIG CLIN: HCPCS | Performed by: FAMILY MEDICINE

## 2020-08-11 PROCEDURE — 4040F PNEUMOC VAC/ADMIN/RCVD: CPT | Performed by: FAMILY MEDICINE

## 2020-08-11 PROCEDURE — 3017F COLORECTAL CA SCREEN DOC REV: CPT | Performed by: FAMILY MEDICINE

## 2020-08-11 PROCEDURE — 99214 OFFICE O/P EST MOD 30 MIN: CPT | Performed by: FAMILY MEDICINE

## 2020-08-11 PROCEDURE — 1036F TOBACCO NON-USER: CPT | Performed by: FAMILY MEDICINE

## 2020-08-11 RX ORDER — LORAZEPAM 0.5 MG/1
0.5 TABLET ORAL 2 TIMES DAILY PRN
Qty: 60 TABLET | Refills: 1 | Status: SHIPPED | OUTPATIENT
Start: 2020-08-21 | End: 2020-08-27

## 2020-08-11 ASSESSMENT — ENCOUNTER SYMPTOMS
CHEST TIGHTNESS: 1
SHORTNESS OF BREATH: 0

## 2020-08-11 NOTE — PROGRESS NOTES
Subjective:      Patient ID: Bulmaro Agosto is a 69 y.o. male. Chief Complaint   Patient presents with    Anxiety     PT TO FOLLOW UP ON ANXIETY FOR MED REFILL   311  HPI    Anxiety  For last 5 years wife undergoing cancer treatment. Had a double mastectomy 4 years ago. Was cleared from breast cancer. Was doing well. She broke her shoulder and left arm was in a cast.  Was incapacitated for several months. Son developed liver and kidney cancer. His wife developed pancreatic cancer. His wife is at home people coming in and out all day. He gets upset and helps him settle down to take the medication. He takes 1 in the morning 1 in the evening at dinnertime. He does not drink alcohol when he takes medication. He has a CPAP machine and it helps but lately he has trouble sleeping. Has to give his wife medication every 2 hours.     Past Medical History:   Diagnosis Date    Arthritis, hip 6/29/2011    Arthropathy of lumbar facet joint 7/23/2013    BPH (benign prostatic hyperplasia) 6/29/2011    Current smoker     DDD (degenerative disc disease), lumbar 6/29/2011    Degeneration of lumbar or lumbosacral intervertebral disc 9/24/2013    Depression 11/11/2011    Hypercholesteremia 6/29/2011    Hyperlipidemia     Hypertension     Insomnia     Liver disease     Painful respiration     Peyronie disease 6/29/2011    Sleep apnea        Past Surgical History:   Procedure Laterality Date    COLONOSCOPY  2008    normal,    [facility]COPY  2018    [facility]    FINGER SURGERY Right 06/05/2020    Dupuytren's contraction deformity of rt 5th finger corrected    [facility]    TONSILLECTOMY AND ADENOIDECTOMY         Social History     Socioeconomic History    Marital status:      Spouse name: Not on file    Number of children: Not on file    Years of education: Not on file    Highest education level: Not on file   Occupational History    Occupation: Retired - owner business   Social Needs    Financial resource strain: Not on file    Food insecurity     Worry: Not on file     Inability: Not on file   [employer] needs     Medical: Not on file     Non-medical: Not on file   Tobacco Use    Smoking status: Former Smoker     Packs/day: 2.00     Years: 8.00     Pack years: 16.00     Last attempt to quit: 1974     Years since quittin.6    Smokeless tobacco: Never Used    Tobacco comment: quit smoking 40 years ago   Substance and Sexual Activity    Alcohol use: Yes     Alcohol/week: 28.0 standard drinks     Types: 28 Shots of liquor per week    Drug use: No    Sexual activity: Not on file   Lifestyle    Physical activity     Days per week: Not on file     Minutes per session: Not on file    Stress: Not on file   Relationships    Social connections     Talks on phone: Not on file     Gets together: Not on file     Attends Yazidism service: Not on file     Active member of club or organization: Not on file     Attends meetings of clubs or organizations: Not on file     Relationship status: Not on file    Intimate partner violence     Fear of current or ex partner: Not on file     Emotionally abused: Not on file     Physically abused: Not on file     Forced sexual activity: Not on file   Other Topics Concern    Not on file   Social History Narrative    No exercise program. 19       Family History   Problem Relation Age of Onset    Heart Failure Mother     Diabetes type 2  Mother     Lung Cancer Father         smoker    Dementia Sister     Heart Surgery Brother     Coronary Art Dis Brother     High Cholesterol Brother     Hypertension Brother        Allergies   Allergen Reactions    Ambien [Zolpidem Tartrate]      Sleepwalking/ sleep eating       Current Outpatient Medications   Medication Sig Dispense Refill    LORazepam (ATIVAN) 0.5 MG tablet Take 1 tablet by mouth 2 times daily as needed for Anxiety for up to 30 days.  FILL 30 tablet 0    metoprolol succinate (TOPROL XL) 100 MG extended release tablet TAKE 1 AND 1/2 TABLETS BY MOUTH EVERY  tablet 0    traZODone (DESYREL) 100 MG tablet TAKE 2 TO 3 TABLETS BY MOUTH NIGHTLY. 270 tablet 1    telmisartan (MICARDIS) 80 MG tablet TAKE 1 TABLET BY MOUTH EVERY DAY 90 tablet 1    gabapentin (NEURONTIN) 800 MG tablet Take 1 tablet by mouth 3 times daily for 90 days. 90 tablet 2    tamsulosin (FLOMAX) 0.4 MG capsule TAKE 1 CAPSULE BY MOUTH EVERY DAY 90 capsule 2    omeprazole (PRILOSEC) 20 MG delayed release capsule TAKE 1 CAPSULE BY MOUTH EVERY DAY 90 capsule 1    DULoxetine (CYMBALTA) 60 MG extended release capsule TAKE 1 CAPSULE BY MOUTH EVERY DAY 90 capsule 3    hydroCHLOROthiazide (HYDRODIURIL) 25 MG tablet TAKE 1 TABLET BY MOUTH EVERY DAY 90 tablet 3    tiZANidine (ZANAFLEX) 4 MG tablet TAKE 2 TABLETS BY MOUTH EVERY DAY IN THE EVENING 180 tablet 1    L-methylfolate-B6-B12 (METANX) 4-50.153-6-04 MG CAPS capsule Take 1 capsule by mouth 2 times daily 180 capsule 3    finasteride (PROSCAR) 5 MG tablet TAKE 1 TABLET BY MOUTH EVERY DAY 90 tablet 3    oxybutynin (DITROPAN-XL) 10 MG extended release tablet TAKE 1 TABLET BY MOUTH EVERY DAY 90 tablet 3    furosemide (LASIX) 40 MG tablet TAKE 1 TABLET BY MOUTH EVERY DAY 90 tablet 0    atorvastatin (LIPITOR) 80 MG tablet TAKE 1 TABLET BY MOUTH EVERY DAY 90 tablet 3    KLOR-CON 10 10 MEQ extended release tablet TAKE 1 TABLET BY MOUTH EVERY DAY 90 tablet 2    Pseudoephedrine-Guaifenesin (MUCINEX D PO) Take 1 tablet by mouth daily.  vitamin D-3 (CHOLECALCIFEROL) 5000 UNITS TABS Take 5,000 Units by mouth daily. Review of Systems   Respiratory: Positive for chest tightness. Negative for shortness of breath. Cardiovascular: Negative for chest pain. Neurological: Negative for dizziness, tremors, light-headedness and headaches. Psychiatric/Behavioral: Positive for agitation and dysphoric mood.  Negative for confusion,

## 2020-08-24 RX ORDER — TIZANIDINE 4 MG/1
TABLET ORAL
Qty: 180 TABLET | Refills: 1 | Status: SHIPPED | OUTPATIENT
Start: 2020-08-24 | End: 2021-02-19

## 2020-08-27 ENCOUNTER — OFFICE VISIT (OUTPATIENT)
Dept: SLEEP MEDICINE | Age: 70
End: 2020-08-27
Payer: MEDICARE

## 2020-08-27 VITALS
HEIGHT: 76 IN | RESPIRATION RATE: 18 BRPM | TEMPERATURE: 98.6 F | WEIGHT: 275 LBS | HEART RATE: 71 BPM | BODY MASS INDEX: 33.49 KG/M2 | DIASTOLIC BLOOD PRESSURE: 70 MMHG | SYSTOLIC BLOOD PRESSURE: 128 MMHG | OXYGEN SATURATION: 94 %

## 2020-08-27 PROCEDURE — 3017F COLORECTAL CA SCREEN DOC REV: CPT | Performed by: PSYCHIATRY & NEUROLOGY

## 2020-08-27 PROCEDURE — G8417 CALC BMI ABV UP PARAM F/U: HCPCS | Performed by: PSYCHIATRY & NEUROLOGY

## 2020-08-27 PROCEDURE — 99213 OFFICE O/P EST LOW 20 MIN: CPT | Performed by: PSYCHIATRY & NEUROLOGY

## 2020-08-27 PROCEDURE — G8427 DOCREV CUR MEDS BY ELIG CLIN: HCPCS | Performed by: PSYCHIATRY & NEUROLOGY

## 2020-08-27 PROCEDURE — 4040F PNEUMOC VAC/ADMIN/RCVD: CPT | Performed by: PSYCHIATRY & NEUROLOGY

## 2020-08-27 PROCEDURE — 1036F TOBACCO NON-USER: CPT | Performed by: PSYCHIATRY & NEUROLOGY

## 2020-08-27 PROCEDURE — 1123F ACP DISCUSS/DSCN MKR DOCD: CPT | Performed by: PSYCHIATRY & NEUROLOGY

## 2020-08-27 ASSESSMENT — SLEEP AND FATIGUE QUESTIONNAIRES
HOW LIKELY ARE YOU TO NOD OFF OR FALL ASLEEP WHILE SITTING INACTIVE IN A PUBLIC PLACE: 2
HOW LIKELY ARE YOU TO NOD OFF OR FALL ASLEEP IN A CAR, WHILE STOPPED FOR A FEW MINUTES IN TRAFFIC: 0
HOW LIKELY ARE YOU TO NOD OFF OR FALL ASLEEP WHILE LYING DOWN TO REST IN THE AFTERNOON WHEN CIRCUMSTANCES PERMIT: 1
ESS TOTAL SCORE: 5
HOW LIKELY ARE YOU TO NOD OFF OR FALL ASLEEP WHILE SITTING AND TALKING TO SOMEONE: 0
HOW LIKELY ARE YOU TO NOD OFF OR FALL ASLEEP WHILE SITTING AND READING: 1
HOW LIKELY ARE YOU TO NOD OFF OR FALL ASLEEP WHEN YOU ARE A PASSENGER IN A CAR FOR AN HOUR WITHOUT A BREAK: 0
HOW LIKELY ARE YOU TO NOD OFF OR FALL ASLEEP WHILE WATCHING TV: 1
HOW LIKELY ARE YOU TO NOD OFF OR FALL ASLEEP WHILE SITTING QUIETLY AFTER LUNCH WITHOUT ALCOHOL: 0

## 2020-08-27 NOTE — PROGRESS NOTES
Non-medical: Not on file   Tobacco Use    Smoking status: Former Smoker     Packs/day: 2.00     Years: 8.00     Pack years: 16.00     Last attempt to quit: 1974     Years since quittin.6    Smokeless tobacco: Never Used    Tobacco comment: quit smoking 40 years ago   Substance and Sexual Activity    Alcohol use: Yes     Alcohol/week: 28.0 standard drinks     Types: 28 Shots of liquor per week    Drug use: No    Sexual activity: Not on file   Lifestyle    Physical activity     Days per week: Not on file     Minutes per session: Not on file    Stress: Not on file   Relationships    Social connections     Talks on phone: Not on file     Gets together: Not on file     Attends Bahai service: Not on file     Active member of club or organization: Not on file     Attends meetings of clubs or organizations: Not on file     Relationship status: Not on file    Intimate partner violence     Fear of current or ex partner: Not on file     Emotionally abused: Not on file     Physically abused: Not on file     Forced sexual activity: Not on file   Other Topics Concern    Not on file   Social History Narrative    No exercise program. 20       Prior to Admission medications    Medication Sig Start Date End Date Taking?  Authorizing Provider   tiZANidine (ZANAFLEX) 4 MG tablet TAKE 2 TABLETS BY MOUTH EVERY DAY IN THE EVENING 20  Yes Ifrah Solares APRN - CNP   metoprolol succinate (TOPROL XL) 100 MG extended release tablet TAKE 1 AND 1/2 TABLETS BY MOUTH EVERY DAY 20  Yes Jordana Jimenez APRN - CNP   traZODone (DESYREL) 100 MG tablet TAKE 2 TO 3 TABLETS BY MOUTH NIGHTLY. 20  Yes Mariela Snowden MD   telmisartan (MICARDIS) 80 MG tablet TAKE 1 TABLET BY MOUTH EVERY DAY 6/3/20  Yes Mariela Snowden MD   tamsulosin (FLOMAX) 0.4 MG capsule TAKE 1 CAPSULE BY MOUTH EVERY DAY 20  Yes Gayathri Blake APRN - CNP   omeprazole (PRILOSEC) 20 MG delayed release capsule TAKE 1 CAPSULE BY MOUTH EVERY DAY 4/22/20  Yes Awa Talley MD   DULoxetine (CYMBALTA) 60 MG extended release capsule TAKE 1 CAPSULE BY MOUTH EVERY DAY 3/31/20  Yes Awa Talley MD   hydroCHLOROthiazide (HYDRODIURIL) 25 MG tablet TAKE 1 TABLET BY MOUTH EVERY DAY 3/31/20  Yes Awa Talley MD   R-oyvszscrduco-V3-B12 [facility]) 7-81.070-6-18 MG CAPS capsule Take 1 capsule by mouth 2 times daily 2/21/20  Yes Awa Talley MD   finasteride (PROSCAR) 5 MG tablet TAKE 1 TABLET BY MOUTH EVERY DAY 11/20/19  Yes Awa Talley MD   oxybutynin (DITROPAN-XL) 10 MG extended release tablet TAKE 1 TABLET BY MOUTH EVERY DAY 11/18/19  Yes SARA Teran CNP   furosemide (LASIX) 40 MG tablet TAKE 1 TABLET BY MOUTH EVERY DAY 10/4/19  Yes SARA Teran CNP   atorvastatin (LIPITOR) 80 MG tablet TAKE 1 TABLET BY MOUTH EVERY DAY 10/4/19  Yes SARA Teran CNP   KLOR-CON 10 10 MEQ extended release tablet TAKE 1 TABLET BY MOUTH EVERY DAY 6/27/19  Yes SARA Teran CNP   vitamin D-3 (CHOLECALCIFEROL) 5000 UNITS TABS Take 5,000 Units by mouth daily. Yes Historical Provider, MD   gabapentin (NEURONTIN) 800 MG tablet Take 1 tablet by mouth 3 times daily for 90 days. 5/28/20 8/26/20  SARA Hudson CNP   Pseudoephedrine-Guaifenesin [facility] PO) Take 1 tablet by mouth daily.     Historical Provider, MD       Allergies as of 08/27/2020 - Review Complete 08/27/2020   Allergen Reaction Noted    Ambien [zolpidem tartrate]  12/03/2013       Patient Active Problem List   Diagnosis    Hypercholesteremia    Hypertension    Insomnia    DDD (degenerative disc disease), lumbar    Arthritis, hip    BPH (benign prostatic hyperplasia)    Peyronie disease    Depression    Arthropathy of lumbar facet joint    Degeneration of lumbar or lumbosacral intervertebral disc    OAB (overactive bladder)    Liver cyst    MATIAS (obstructive sleep apnea)       Past Medical History:   Diagnosis Date    Arthritis, hip 6/29/2011    Arthropathy of lumbar facet joint 7/23/2013    BPH (benign prostatic hyperplasia) 6/29/2011    Current smoker     DDD (degenerative disc disease), lumbar 6/29/2011    Degeneration of lumbar or lumbosacral intervertebral disc 9/24/2013    Depression 11/11/2011    Hypercholesteremia 6/29/2011    Hyperlipidemia     Hypertension     Insomnia     Liver disease     Painful respiration     Peyronie disease 6/29/2011    Sleep apnea        Past Surgical History:   Procedure Laterality Date    COLONOSCOPY  2008    normal,    COLONOSCOPY  2018    [facility]    FINGER SURGERY Right 06/05/2020    Dupuytren's contraction deformity of rt 5th finger corrected    JOINT REPLACEMENT Left 2000    [facility]    TONSILLECTOMY AND ADENOIDECTOMY         Family History   Problem Relation Age of Onset    Heart Failure Mother     Diabetes type 2  Mother     Lung Cancer Father         smoker    Dementia Sister     Heart Surgery Brother     Coronary Art Dis Brother     High Cholesterol Brother     Hypertension Brother     Kidney Cancer Son 41        liver cancer       Review of Systems    Objective:     Vitals:  Weight BMI Neck circumference    Wt Readings from Last 3 Encounters:   08/27/20 275 lb (124.7 kg)   06/01/20 276 lb (125.2 kg)   10/28/19 275 lb 9.6 oz (125 kg)    Body mass index is 33.47 kg/m². BP HR SaO2   BP Readings from Last 3 Encounters:   08/27/20 128/70   06/01/20 122/80   10/28/19 132/84    Pulse Readings from Last 3 Encounters:   08/27/20 71   06/01/20 67   10/28/19 68    SpO2 Readings from Last 3 Encounters:   08/27/20 94%   06/01/20 98%   08/29/19 97%        Themandibular molar Class :   [x]1 []2 []3      Mallampati I Airway Classification:   []1 []2 []3 [x]4      Physical Exam  Vitals signs and nursing note reviewed. Constitutional:       Appearance: Normal appearance. HENT:      Head: Atraumatic.       Nose: Nose normal.      Mouth/Throat:      Mouth: Mucous membranes are moist.   Eyes:      Extraocular Movements: Extraocular movements intact. Neck:      Musculoskeletal: Normal range of motion and neck supple. Cardiovascular:      Rate and Rhythm: Normal rate and regular rhythm. Pulmonary:      Effort: Pulmonary effort is normal.      Breath sounds: Normal breath sounds. Musculoskeletal: Normal range of motion. Skin:     General: Skin is warm. Neurological:      General: No focal deficit present. Psychiatric:         Mood and Affect: Mood normal.         :   Severe Obstructive Sleep Apnea/Hypopnea Syndrome under good control on PAP at 13 cmwp. Diagnosis Orders   1. MATIAS on CPAP     2. Dependence on other enabling machines and devices       Plan: Will continue the PAP at 13 cmwp. I will order PAP supplies, mask, filters. ... No orders of the defined types were placed in this encounter. No follow-ups on file.     Jesse Armstrong MD  Medical Director - [facility]

## 2020-09-02 RX ORDER — LORAZEPAM 0.5 MG/1
TABLET ORAL
Qty: 30 TABLET | Refills: 0 | Status: SHIPPED | OUTPATIENT
Start: 2020-09-06 | End: 2020-10-06

## 2020-10-01 RX ORDER — ATORVASTATIN CALCIUM 80 MG/1
TABLET, FILM COATED ORAL
Qty: 90 TABLET | Refills: 1 | Status: SHIPPED | OUTPATIENT
Start: 2020-10-01 | End: 2021-03-31

## 2020-10-17 NOTE — PATIENT INSTRUCTIONS
Niraj Santos was seen today for anxiety. Diagnoses and all orders for this visit:    Adjustment disorder with anxiety  -      LORazepam (ATIVAN) 0.5 MG tablet; Take 1 tablet by mouth 2 times daily as needed for Anxiety for up to 60 days. Try doing something relaxing before going to bed such as reading. Getting better sleep will help anxiety. Also exercise is excellent for anxiety. Try to find a time to get out and walk or even do exercises inside the home if you are not able to get out doors walk. The recommendation from the [facility] Service for exercise is 30 minutes brisk walking or the equivalent 5 times per week OR aerobic levels of exercise 25 minutes 3 times a week (breathing slightly hard and sweating at room temperature are indicators of aerobic exercise). OR walking 10,000 steps/day counted on a pedometer or cell phone or Fitbit type watch. However the newest recommendation encourages us to be active any way we can buy making good choices such as taking the stairs instead of the elevator/escalator, parking at the end of the parking lots stores and walking the distance in and back out, looking for ways to be active with our families like going for a walk with our children or grandchildren, riding bikes with our family and friends, and instead of sitting on the couch and talking on the phone to friends or family drive to the mall and walk together while talking or talking together on the cell phone while we walk on treadmills or right exercise bikes at home this can help encourage us to stick with a program to have accountability, or riding an exercise bike or walking on a treadmill or using an elliptical while watching our favorite television programs or movies for any period of time. For some people even 5 or 10 minutes 2 or 3 times a day would be extremely helpful.     Educated about COVID-19 virus infection  The risk is likely to increase for COVID-19 infection as the reopening occurs. This is because while some people are being careful with social distancing masks and social isolation other people are totally ignoring it. Preventing the Spread of Coronavirus Disease 2019 in Homes and Residential Communities   For the most recent information go to "[email or web address]    Prevention steps for People with confirmed or suspected COVID-19 (including persons under investigation) who do not need to be hospitalized  and   People with confirmed COVID-19 who were hospitalized and determined to be medically stable to go home    Your healthcare provider and public health staff will evaluate whether you can be cared for at home. If it is determined that you do not need to be hospitalized and can be isolated at home, you will be monitored by staff from your local or state health department. You should follow the prevention steps below until a healthcare provider or local or state health department says you can return to your normal activities. Stay home except to get medical care  People who are mildly ill with COVID-19 are able to isolate at home during their illness. You should restrict activities outside your home, except for getting medical care. Do not go to work, school, or public areas. Avoid using public transportation, ride-sharing, or taxis. Separate yourself from other people and animals in your home  People: As much as possible, you should stay in a specific room and away from other people in your home. Also, you should use a separate bathroom, if available. Animals: You should restrict contact with pets and other animals while you are sick with COVID-19, just like you would around other people. Although there have not been reports of pets or other animals becoming sick with COVID-19, it is still recommended that people sick with COVID-19 limit contact with animals until more information is known about the virus.  When possible, have another member of your household care for your animals while you are sick. If you are sick with COVID-19, avoid contact with your pet, including petting, snuggling, being kissed or licked, and sharing food. If you must care for your pet or be around animals while you are sick, wash your hands before and after you interact with pets and wear a facemask. Call ahead before visiting your doctor  If you have a medical appointment, call the healthcare provider and tell them that you have or may have COVID-19. This will help the healthcare providers office take steps to keep other people from getting infected or exposed. Wear a facemask  You should wear a facemask when you are around other people (e.g., sharing a room or vehicle) or pets and before you enter a healthcare providers office. If you are not able to wear a facemask (for example, because it causes trouble breathing), then people who live with you should not stay in the same room with you, or they should wear a facemask if they enter your room. Cover your coughs and sneezes  Cover your mouth and nose with a tissue when you cough or sneeze. Throw used tissues in a lined trash can. Immediately wash your hands with soap and water for at least 20 seconds or, if soap and water are not available, clean your hands with an alcohol-based hand  that contains at least 60% alcohol. Clean your hands often  Wash your hands often with soap and water for at least 20 seconds, especially after blowing your nose, coughing, or sneezing; going to the bathroom; and before eating or preparing food. If soap and water are not readily available, use an alcohol-based hand  with at least 60% alcohol, covering all surfaces of your hands and rubbing them together until they feel dry. Soap and water are the best option if hands are visibly dirty. Avoid touching your eyes, nose, and mouth with unwashed hands.   Avoid sharing personal household items  You should not share dishes, drinking glasses, cups, eating utensils, towels, or bedding with other people or pets in your home. After using these items, they should be washed thoroughly with soap and water. Clean all high-touch surfaces everyday  High touch surfaces include counters, tabletops, doorknobs, bathroom fixtures, toilets, phones, keyboards, tablets, and bedside tables. Also, clean any surfaces that may have blood, stool, or body fluids on them. Use a household cleaning spray or wipe, according to the label instructions. Labels contain instructions for safe and effective use of the cleaning product including precautions you should take when applying the product, such as wearing gloves and making sure you have good ventilation during use of the product. Monitor your symptoms  Seek prompt medical attention if your illness is worsening (e.g., difficulty breathing). Before seeking care, call your healthcare provider and tell them that you have, or are being evaluated for, COVID-19. Put on a facemask before you enter the facility. These steps will help the healthcare providers office to keep other people in the office or waiting room from getting infected or exposed. Ask your healthcare provider to call the local or [facility] health department. Persons who are placed under active monitoring or facilitated self-monitoring should follow instructions provided by their local health department or occupational health professionals, as appropriate. When working with your local health department check their available hours. If you have a medical emergency and need to call 911, notify the dispatch personnel that you have, or are being evaluated for COVID-19. If possible, put on a facemask before emergency medical services arrive. Discontinuing home isolation  Patients with confirmed COVID-19 should remain under home isolation precautions until the risk of secondary transmission to others is thought to be low.  The are not infected with or heavily exposed to COVID-19, handwashing/hand gel use, social distancing, and social isolation for nonessential activities. IF you develop ANY respiratory infection symptoms (not just allergy symptoms) suggestive of COVID-19 start the recommendations below: Instructions for Respiratory Infections (SAVE THIS SHEET)    For the first 7-14 days of symptoms follow instructions below, even before being seen in the office or even during treatment with antibiotics, until symptom free. 1. Water: Drink 1 ounce of water for every 2 pounds of body weight for adults, you need 120 ounces of water/fluids per day. This will loosen mucus in the head and chest & improve the weak feeling of dehydration, allow the body to get germ fighting resources to the infection. Half of fluids can be juice or sugar free Crystal Light. Don't count drinks with caffeine, alcohol or carbonation. Infants can have Pedialyte liquid or freezer pops. Avoid salt and sports drinks if you have high Blood Pressure, swelling in the feet or ankles or have heart problems. 2. Humidity: Humidify the air to 35-50% ( or until the windows fog over slightly). Can use a humidifier, vaporizer, boil water on the stove or put a coffee can full of water on the heater vents. This will loosen mucus from infections and allergies. 3. Sleep: Get 8-10 hours a night and rest during the evening after work or school. If you have trouble sleeping, adults can take Melatonin 5mg up to 2 tabs at bedtime ( not for children or pregnant women). If Mono is suspected then sleep during 9PM to 9AM time span (if possible.)  4. Cough: Take cough medicines with Guaifenesin ( to loosen chest or head congestion) and Dextromethorphan ( to decrease excess cough). Robitussin D.M. Syrup every 4-6 hrs OR Mucinex D. M. pills OR Delsym DM syrup twice a day.  Use the pediatric formulations for children over 6 months making sure they are alcohol & sugar free for children, pregnant women, and diabetics. 5. Pain And Fevers: Take Acetaminophen ( Tylenol) for fevers, aches, and headaches. 2-500 mg every 8 hours for adults. Appropriate doses at bedtime for children may help them sleep better. If pregnant take 1 -500 mg (Tylenol) every 8 hours as needed. Ibuprofen/Aleve/aspirin for pain and fevers SHOULD NOT BE USED IN THE SETTING OF POSSIBLE COVID-19 viral infection NOR if pregnant, if you have acid reflux, high blood pressure, CHF, or kidney problems. 6.Gargle: (DAY ONE OF SYMPTOMS) Gargle in the back of the throat with the head tilted back and to the sides with a strong mouthwash  ( Listerine or Scope) after meals and at bedtime at least 4 -5 times a day. This helps kill bacteria and viruses in the back of the throat and will shorten the duration and decrease the severity of your symptoms: sore throat, cough, ear popping,/ear pain, and possibly dizziness. 7. Smoking: Avoid smoking or exposure to second hand smoke. 8. Zinc: (DAY ONE OF SYMPTOMS)  Zinc lozenges such as Cold David (available most stores), or Basic ([employer] brand) will help shorten the duration and lessen symptoms such as sore throat, cough, nasal congestion, runny nose, and post nasal drip. Use 1 lozenge every 2-4 hours ( after meals if stomach is sensitive). Children can use 10-15 mg or less 3-4 times a day or Zinc lollypops. In pregnancy limit to 50-60 mg a day for 7 days as prenatals have Zinc also. With diarrhea use zinc pills 50 mg 1/2 to 1 pill 2x/day. 9. Vitamins: Vitamin C 500 mg with breakfast and dinner (with suspected or diagnosed COVID-19 infection take vitamin C 1000 mg 2-3 times a day). Children and pregnant women should drink citrus juices. This speeds healing and strengthens immune system. 10. Chest Symptoms: Vicks Vapor rub to the chest at bedtime. 11. Decongestants: Avoid all decongestants and antihistamine cold preparations in children.   Decongestants should always be avoided in people with high blood pressure, palpitations, heart disease and those on stimulant medications. Antihistamines may impede the body's ability to fight COVID-19.  12. Frequent hand washing and/or hand gel especially after coughing or sneezing into the hands or blowing the nose to help prevent spreading to others. Use kleenex and NOT handkerchiefs. Try all of the above starting with day 1 of symptoms. If Strep throat symptoms appear call to be seen in the office as soon as possible and don't gargle on that day. Newborns, infants, or anyone with earaches or influenza may need to be seen quickly. Adults with fevers over 103 degrees or shortness of breath should call the office immediately. Patient Education        Adjustment Disorder: Care Instructions  Your Care Instructions     Adjustment disorder means that you have emotional or behavioral problems because of stress. But your response to the stress is far more severe than a normal response. It is severe enough to affect your work or social life and may cause depression and physical pains and problems. Events that may cause this response can include a divorce, money problems, or starting school or a new job. It might be anything that causes some stress. This disorder is most often a short-term problem. It happens within 3 months of the stressful event or change. If the response lasts longer than 6 months after the event ends, you may have a more serious disorder. Follow-up care is a key part of your treatment and safety. Be sure to make and go to all appointments, and call your doctor if you are having problems. It's also a good idea to know your test results and keep a list of the medicines you take. How can you care for yourself at home? · Go to all counseling sessions. Do not skip any because you are feeling better. · If your doctor prescribed medicines, take them exactly as prescribed.  Call your doctor if you think you are having a problem with your medicine. You will get more details on the specific medicines your doctor prescribes. · Discuss the causes of your stress with a good friend or family member. Or you can join a support group for people with similar problems. Talking to others sometimes relieves stress. · Get at least 30 minutes of exercise on most days of the week. Walking is a good choice. You also may want to do other activities, such as running, swimming, cycling, or playing tennis or team sports. Relaxation techniques  Do relaxation exercises 10 to 20 minutes a day. You can play soothing, relaxing music while you do them, if you wish. · Tell others in your house that you are going to do your relaxation exercises. Ask them not to disturb you. · Find a comfortable, quiet place. · Lie down on your back, or sit with your back straight. · Focus on your breathing. Make it slow and steady. · Breathe in through your nose. Breathe out through either your nose or mouth. · Breathe deeply, filling up the area between your navel and your rib cage. Breathe so that your belly goes up and down. · Do not hold your breath. · Breathe like this for 5 to 10 minutes. Notice the feeling of calmness throughout your whole body. As you continue to breathe slowly and deeply, relax by doing these next steps for another 5 to 10 minutes:  · Tighten and relax each muscle group in your body. Start at your toes, and work your way up to your head. · Imagine your muscle groups relaxing and getting heavy. · Empty your mind of all thoughts. · Let yourself relax more and more deeply. · Be aware of the state of calmness that surrounds you. · When your relaxation time is over, you can bring yourself back to alertness by moving your fingers and toes. Then move your hands and feet. And then move your entire body. Sometimes people fall asleep during relaxation. But they most often wake up soon.   · Always give yourself time to return to full alertness before you drive a car. Wait to do anything that might cause an accident if you are not fully alert. Never play a relaxation tape while you drive a car. When should you call for help? Call 911 anytime you think you may need emergency care. For example, call if:    · You feel you cannot stop from hurting yourself or someone else. Keep the numbers for these national suicide hotlines: 8-097-213-TALK (7-447.221.3839) and 9-179-XSVAFZS (6-390.793.9596). If you or someone you know talks about suicide or feeling hopeless, get help right away. Watch closely for changes in your health, and be sure to contact your doctor if:    · You have new anxiety, or your anxiety gets worse.     · You have been feeling sad, depressed, or hopeless or have lost interest in things that you usually enjoy.     · You do not get better as expected. Where can you learn more? Go to https://[email or web address]. org and sign in to your [employer] account. Enter [id number] in the [employer] box to learn more about \"Adjustment Disorder: Care Instructions. \"     If you do not have an account, please click on the \"Sign Up Now\" link. Current as of: December 16, 2019               Content Version: 12.6  © 6728-0753 [employer]. Care instructions adapted under license by [facility] ([facility]). If you have questions about a medical condition or this instruction, always ask your healthcare professional. [place] [address] any warranty or liability for your use of this information.

## 2020-10-19 RX ORDER — OMEPRAZOLE 20 MG/1
CAPSULE, DELAYED RELEASE ORAL
Qty: 90 CAPSULE | Refills: 1 | Status: SHIPPED | OUTPATIENT
Start: 2020-10-19 | End: 2021-04-05

## 2020-10-26 RX ORDER — METOPROLOL SUCCINATE 100 MG/1
TABLET, EXTENDED RELEASE ORAL
Qty: 135 TABLET | Refills: 0 | Status: SHIPPED | OUTPATIENT
Start: 2020-10-26 | End: 2021-01-30 | Stop reason: SDUPTHER

## 2020-10-26 NOTE — TELEPHONE ENCOUNTER
LAST VISIT 8/11/2020 CHRONIC PAIN WAS NOT ADDRESSED, NEXT VISIT NONE. SPOKE TO PT GABAPENTIN NOT NEEDED RIGHT NOW. CANCELED ORDER.   LAB ORDERS PLACED FOR PT TO GET LABS THIS WEEK PRIOR TO FUTURE APPT

## 2020-10-27 DIAGNOSIS — N40.1 BENIGN PROSTATIC HYPERPLASIA WITH LOWER URINARY TRACT SYMPTOMS, SYMPTOM DETAILS UNSPECIFIED: ICD-10-CM

## 2020-10-27 DIAGNOSIS — Z12.5 SCREENING PSA (PROSTATE SPECIFIC ANTIGEN): ICD-10-CM

## 2020-10-27 DIAGNOSIS — E78.00 HYPERCHOLESTEREMIA: ICD-10-CM

## 2020-10-27 DIAGNOSIS — I10 ESSENTIAL HYPERTENSION: ICD-10-CM

## 2020-10-27 DIAGNOSIS — E55.9 VITAMIN D DEFICIENCY: ICD-10-CM

## 2020-10-27 DIAGNOSIS — D64.9 ANEMIA, UNSPECIFIED TYPE: ICD-10-CM

## 2020-10-27 LAB
A/G RATIO: 2.6 (ref 1.1–2.2)
ALBUMIN SERPL-MCNC: 4.4 G/DL (ref 3.4–5)
ALP BLD-CCNC: 77 U/L (ref 40–129)
ALT SERPL-CCNC: 21 U/L (ref 10–40)
ANION GAP SERPL CALCULATED.3IONS-SCNC: 10 MMOL/L (ref 3–16)
AST SERPL-CCNC: 15 U/L (ref 15–37)
BILIRUB SERPL-MCNC: 0.5 MG/DL (ref 0–1)
BUN BLDV-MCNC: 19 MG/DL (ref 7–20)
CALCIUM SERPL-MCNC: 9.6 MG/DL (ref 8.3–10.6)
CHLORIDE BLD-SCNC: 101 MMOL/L (ref 99–110)
CHOLESTEROL, TOTAL: 193 MG/DL (ref 0–199)
CO2: 29 MMOL/L (ref 21–32)
CREAT SERPL-MCNC: 0.9 MG/DL (ref 0.8–1.3)
FOLATE: >20 NG/ML (ref 4.78–24.2)
GFR AFRICAN AMERICAN: >60
GFR NON-AFRICAN AMERICAN: >60
GLOBULIN: 1.7 G/DL
GLUCOSE BLD-MCNC: 100 MG/DL (ref 70–99)
HDLC SERPL-MCNC: 56 MG/DL (ref 40–60)
LDL CHOLESTEROL CALCULATED: 113 MG/DL
POTASSIUM SERPL-SCNC: 4.4 MMOL/L (ref 3.5–5.1)
PROSTATE SPECIFIC ANTIGEN: 4.72 NG/ML (ref 0–4)
SODIUM BLD-SCNC: 140 MMOL/L (ref 136–145)
TOTAL PROTEIN: 6.1 G/DL (ref 6.4–8.2)
TRIGL SERPL-MCNC: 119 MG/DL (ref 0–150)
VITAMIN B-12: >2000 PG/ML (ref 211–911)
VITAMIN D 25-HYDROXY: 69.8 NG/ML
VLDLC SERPL CALC-MCNC: 24 MG/DL

## 2020-10-28 RX ORDER — GABAPENTIN 800 MG/1
800 TABLET ORAL 3 TIMES DAILY
Qty: 90 TABLET | Refills: 5 | Status: SHIPPED | OUTPATIENT
Start: 2020-10-28 | End: 2021-04-23 | Stop reason: SDUPTHER

## 2020-11-11 ENCOUNTER — VIRTUAL VISIT (OUTPATIENT)
Dept: FAMILY MEDICINE CLINIC | Age: 70
End: 2020-11-11
Payer: MEDICARE

## 2020-11-11 PROBLEM — I77.9 BILATERAL CAROTID ARTERY DISEASE (HCC): Status: ACTIVE | Noted: 2020-11-11

## 2020-11-11 PROBLEM — M46.96 UNSPECIFIED INFLAMMATORY SPONDYLOPATHY, LUMBAR REGION (HCC): Status: ACTIVE | Noted: 2020-11-11

## 2020-11-11 PROCEDURE — G8510 SCR DEP NEG, NO PLAN REQD: HCPCS | Performed by: FAMILY MEDICINE

## 2020-11-11 PROCEDURE — 99214 OFFICE O/P EST MOD 30 MIN: CPT | Performed by: FAMILY MEDICINE

## 2020-11-11 RX ORDER — FINASTERIDE 5 MG/1
TABLET, FILM COATED ORAL
Qty: 90 TABLET | Refills: 3 | Status: SHIPPED | OUTPATIENT
Start: 2020-11-11 | End: 2021-11-18

## 2020-11-11 RX ORDER — OXYBUTYNIN CHLORIDE 10 MG/1
TABLET, EXTENDED RELEASE ORAL
Qty: 90 TABLET | Refills: 3 | Status: SHIPPED | OUTPATIENT
Start: 2020-11-11 | End: 2021-11-18

## 2020-11-11 RX ORDER — AMOXICILLIN 875 MG/1
875 TABLET, COATED ORAL 2 TIMES DAILY
Qty: 14 TABLET | Refills: 0 | Status: SHIPPED | OUTPATIENT
Start: 2020-11-11 | End: 2020-11-18

## 2020-11-11 ASSESSMENT — PATIENT HEALTH QUESTIONNAIRE - PHQ9: DEPRESSION UNABLE TO ASSESS: PT REFUSES

## 2020-11-11 NOTE — PROGRESS NOTES
2020    TELEHEALTH EVALUATION -- Audio/Visual (During KQCBX-91 public health emergency)    HPI:    Dahlia Closs Even (:  1950) has requested an audio/video evaluation for the following concern(s):    Chief Complaint   Patient presents with    Pain     NEUROPATHY ROUTINE FOLLOW UP    Hypertension     HTN ROUTINE FOLLOW UP    Depression     DEPRESSION ROUTINE FOLLOW UP     BP Readings from Last 3 Encounters:   20 128/70   20 122/80   10/28/19 132/84     Pulse Readings from Last 3 Encounters:   20 71   20 67   10/28/19 68     Wt Readings from Last 3 Encounters:   20 275 lb (124.7 kg)   20 276 lb (125.2 kg)   10/28/19 275 lb 9.6 oz (125 kg)     psa up from 3.64 to 4.72  O/w recent labs including lipid,cmp, b12/folate, vit d looked good. Sinusitis symptoms for 5 days - had cold but more thick mucus. No focal sinus ttp - a lot of nasal congestion/ cough  Using lori seltzer plus. Seeing family some but limited going out  Son getting tested for covid - no exposure. Urine flow pretty good overall  Dx w/ peyronie's. Saw GI last week -reassured liver cysts - may do f/u imaging. Sleeping well lately. Missing wife - passed 3 months. cpap for thompson working well. No cp/palpitations - seen by cardio  Pain okay w/ cymbalta/ gabapentin - legs hurt if sits for long o/w walking/ riding bike.  gerd stable. Review of Systems    Prior to Visit Medications    Medication Sig Taking? Authorizing Provider   gabapentin (NEURONTIN) 800 MG tablet Take 1 tablet by mouth 3 times daily for 180 days.  Yes Gretta Rudolph MD   metoprolol succinate (TOPROL XL) 100 MG extended release tablet TAKE 1.5 TABLETS BY MOUTH EVERY DAY  Patient taking differently: Take 100 mg by mouth daily  Yes SARA Mace - CNP   omeprazole (PRILOSEC) 20 MG delayed release capsule TAKE 1 CAPSULE BY MOUTH EVERY DAY Yes SARA Matute - CNP   atorvastatin (LIPITOR) 80 MG tablet TAKE 1 TABLET BY MOUTH EVERY DAY Yes SARA Reeder CNP   tiZANidine (ZANAFLEX) 4 MG tablet TAKE 2 TABLETS BY MOUTH EVERY DAY IN THE EVENING Yes SARA Contreras CNP   traZODone (DESYREL) 100 MG tablet TAKE 2 TO 3 TABLETS BY MOUTH NIGHTLY. Yes Marge Elise MD   telmisartan (MICARDIS) 80 MG tablet TAKE 1 TABLET BY MOUTH EVERY DAY Yes Marge Elise MD   tamsulosin (FLOMAX) 0.4 MG capsule TAKE 1 CAPSULE BY MOUTH EVERY DAY Yes Lazarus Ishihara, APRN - CNP   DULoxetine (CYMBALTA) 60 MG extended release capsule TAKE 1 CAPSULE BY MOUTH EVERY DAY Yes Marge Elise MD   hydroCHLOROthiazide (HYDRODIURIL) 25 MG tablet TAKE 1 TABLET BY MOUTH EVERY DAY Yes Marge Elise MD   L-methylfolate-B6-B12 [facility]) 5-28.258-5-97 MG CAPS capsule Take 1 capsule by mouth 2 times daily Yes Marge Elise MD   finasteride (PROSCAR) 5 MG tablet TAKE 1 TABLET BY MOUTH EVERY DAY Yes Marge Elise MD   oxybutynin (DITROPAN-XL) 10 MG extended release tablet TAKE 1 TABLET BY MOUTH EVERY DAY Yes SARA Mccarthy CNP   furosemide (LASIX) 40 MG tablet TAKE 1 TABLET BY MOUTH EVERY DAY  Patient taking differently: Take 40 mg by mouth every other day  Yes SARA Mccarthy CNP   KLOR-CON 10 10 MEQ extended release tablet TAKE 1 TABLET BY MOUTH EVERY DAY  Patient taking differently: Take 10 mEq by mouth every other day  Yes SARA Mccarthy CNP   Pseudoephedrine-Guaifenesin [facility] D PO) Take 1 tablet by mouth daily. Yes Historical Provider, MD   vitamin D-3 (CHOLECALCIFEROL) 5000 UNITS TABS Take 5,000 Units by mouth daily. Yes Historical Provider, MD       Social History     Tobacco Use    Smoking status: Former Smoker     Packs/day: 2.00     Years: 8.00     Pack years: 16.00     Last attempt to quit: 1974     Years since quittin.8    Smokeless tobacco: Never Used    Tobacco comment: quit smoking 40 years ago   Substance Use Topics    Alcohol use:  Yes     Alcohol/week: 28.0 standard drinks     Types: 28 Shots of liquor per week disease), lumbar     7. Primary insomnia     8. Elevated PSA  AFL - Alexys Patel MD, [facility], [facility]   9. Unspecified inflammatory spondylopathy, lumbar region ([facility] [address]     10. Bilateral carotid artery disease, unspecified type ([facility] [address]     11. Acute sinusitis, recurrence not specified, unspecified location       Grieving d/w pt - sleep/ mood okay for most part - missing wife a lot -3months since passed  Amoxil for sinusitis w/ sx'atic tx d/w pt  Seen by cardio recently - ? Carotid imaging - bp/ lipid control and asa - bp well controlled w/ good lipids on latest labs  Trazodone for sleep w/ cymbalta for mood/ pain along w/ gabapentin - stable  Refer to urology for eval w/ bph/ fam hx prostate ca and elevated psa  No change in meds presently  Refills ordered  Reviewed labs w/ patient  Flu shot  covid 19 precautions d/w pt  Diet/ activity dw/ pt  F/u 6 months/ prn  No follow-ups on file. Nicole Agosto is a 69 y.o. male being evaluated by a Virtual Visit (video visit) encounter to address concerns as mentioned above. A caregiver was present when appropriate. Due to this being a TeleHealth encounter (During [place] [id number] public health emergency), evaluation of the following organ systems was limited: Vitals/Constitutional/EENT/Resp/CV/GI//MS/Neuro/Skin/Heme-Lymph-Imm. Pursuant to the emergency declaration under the [facility], [address] [place] [address] authority and the [employer] Act, this Virtual Visit was conducted with patient's (and/or legal guardian's) consent, to reduce the patient's risk of exposure to COVID-19 and provide necessary medical care. The patient (and/or legal guardian) has also been advised to contact this office for worsening conditions or problems, and seek emergency medical treatment and/or call 911 if deemed necessary.      Patient identification was verified at the start of the visit: Yes    Total time spent on this encounter: 21-30 minutes    Services were provided through a video synchronous discussion virtually to substitute for in-person clinic visit. Patient and provider were located at their individual homes. --Uvaldo Velasquez MD on 11/11/2020 at 1:32 PM    An electronic signature was used to authenticate this note.

## 2020-12-18 RX ORDER — TELMISARTAN 80 MG/1
TABLET ORAL
Qty: 90 TABLET | Refills: 1 | Status: SHIPPED | OUTPATIENT
Start: 2020-12-18 | End: 2021-06-28

## 2021-01-08 RX ORDER — TRAZODONE HYDROCHLORIDE 100 MG/1
TABLET ORAL
Qty: 270 TABLET | Refills: 0 | Status: SHIPPED | OUTPATIENT
Start: 2021-01-08 | End: 2021-04-12

## 2021-01-22 RX ORDER — FUROSEMIDE 40 MG/1
TABLET ORAL
Qty: 90 TABLET | Refills: 0 | Status: SHIPPED | OUTPATIENT
Start: 2021-01-22 | End: 2021-04-21

## 2021-01-30 ENCOUNTER — PATIENT MESSAGE (OUTPATIENT)
Dept: FAMILY MEDICINE CLINIC | Age: 71
End: 2021-01-30

## 2021-02-01 ENCOUNTER — OFFICE VISIT (OUTPATIENT)
Dept: FAMILY MEDICINE CLINIC | Age: 71
End: 2021-02-01
Payer: MEDICARE

## 2021-02-01 VITALS
WEIGHT: 275 LBS | HEIGHT: 76 IN | DIASTOLIC BLOOD PRESSURE: 78 MMHG | TEMPERATURE: 97.1 F | SYSTOLIC BLOOD PRESSURE: 120 MMHG | BODY MASS INDEX: 33.49 KG/M2

## 2021-02-01 DIAGNOSIS — N63.23 UNSPECIFIED LUMP IN THE LEFT BREAST, LOWER OUTER QUADRANT: Primary | ICD-10-CM

## 2021-02-01 PROCEDURE — 99213 OFFICE O/P EST LOW 20 MIN: CPT | Performed by: NURSE PRACTITIONER

## 2021-02-01 PROCEDURE — G8427 DOCREV CUR MEDS BY ELIG CLIN: HCPCS | Performed by: NURSE PRACTITIONER

## 2021-02-01 PROCEDURE — G8484 FLU IMMUNIZE NO ADMIN: HCPCS | Performed by: NURSE PRACTITIONER

## 2021-02-01 PROCEDURE — 3017F COLORECTAL CA SCREEN DOC REV: CPT | Performed by: NURSE PRACTITIONER

## 2021-02-01 PROCEDURE — G8417 CALC BMI ABV UP PARAM F/U: HCPCS | Performed by: NURSE PRACTITIONER

## 2021-02-01 PROCEDURE — 4040F PNEUMOC VAC/ADMIN/RCVD: CPT | Performed by: NURSE PRACTITIONER

## 2021-02-01 PROCEDURE — 1123F ACP DISCUSS/DSCN MKR DOCD: CPT | Performed by: NURSE PRACTITIONER

## 2021-02-01 PROCEDURE — 1036F TOBACCO NON-USER: CPT | Performed by: NURSE PRACTITIONER

## 2021-02-01 NOTE — PROGRESS NOTES
Sundar Agosto (:  1950) is a 69 y.o. male,Established patient, here for evaluation of the following chief complaint(s):  Breast Mass (lump on left breast painfull at times no blood discharge coming from nipple)      ASSESSMENT/PLAN:  1. Unspecified lump in the left breast, lower outer quadrant   -The palpable mass to the left breast is mobile and mildly tender. Feels fibrous on palpation. Ordering ultrasound to rule out breast cancer or other etiology which would require further intervention. He will follow-up as needed if symptoms worsen or fail to improve. -     US BREAST COMPLETE LEFT; Future      Return if symptoms worsen or fail to improve. SUBJECTIVE/OBJECTIVE:  HORACIO Jett presents today with concerns of a lump to his left breast.  He states that this is been present for multiple months. He was not thinking of anything of it, but a friend who has had a breast cancer told him he should get it checked. He states that at times it can be tender when suppressed. Denies any discharge. Also denies any fever or recent illness. Review of Systems   Constitutional: Negative for chills and fever. Musculoskeletal:        Lump to left breast       Physical Exam  Constitutional:       General: He is not in acute distress. Appearance: Normal appearance. He is obese. Musculoskeletal:      Comments: Mobile, mildly tender, fibrous nodule in the 5 o'clock position just under the nipple. No discharge. Neurological:      Mental Status: He is alert and oriented to person, place, and time. Psychiatric:         Mood and Affect: Mood normal.         Behavior: Behavior normal.         Thought Content:  Thought content normal.         Judgment: Judgment normal. On this date 02/01/21 I have spent 20 minutes reviewing previous notes, test results and face to face with the patient discussing the diagnosis and importance of compliance with the treatment plan as well as documenting on the day of the visit. An electronic signature was used to authenticate this note.     --Kulwant Connell, SARA - CNP

## 2021-02-01 NOTE — TELEPHONE ENCOUNTER
From: Charmaine Lawrence Even  To: Mian Duong MD  Sent: 1/30/2021 11:19 AM EST  Subject: Non-Urgent Medical Question    Dr. Armen Mario,    I have a lump on my left breast and was wondering if it should be looked at by someone. Hope all is well with you and your family. Sincerely.     Octavio Anguiano even

## 2021-02-04 ENCOUNTER — HOSPITAL ENCOUNTER (OUTPATIENT)
Dept: ULTRASOUND IMAGING | Age: 71
Discharge: HOME | End: 2021-02-04
Payer: MEDICARE

## 2021-02-04 ENCOUNTER — HOSPITAL ENCOUNTER (OUTPATIENT)
Dept: WOMENS IMAGING | Age: 71
Discharge: HOME | End: 2021-02-04
Payer: MEDICARE

## 2021-02-04 DIAGNOSIS — N63.23 UNSPECIFIED LUMP IN THE LEFT BREAST, LOWER OUTER QUADRANT: ICD-10-CM

## 2021-02-04 DIAGNOSIS — N63.0 LUMP OF BREAST: ICD-10-CM

## 2021-02-04 PROCEDURE — G0279 TOMOSYNTHESIS, MAMMO: HCPCS

## 2021-02-19 RX ORDER — TIZANIDINE 4 MG/1
TABLET ORAL
Qty: 180 TABLET | Refills: 0 | Status: SHIPPED | OUTPATIENT
Start: 2021-02-19 | End: 2021-05-20 | Stop reason: SDUPTHER

## 2021-02-19 RX ORDER — TAMSULOSIN HYDROCHLORIDE 0.4 MG/1
CAPSULE ORAL
Qty: 90 CAPSULE | Refills: 0 | Status: SHIPPED | OUTPATIENT
Start: 2021-02-19 | End: 2021-06-04 | Stop reason: SDUPTHER

## 2021-03-04 ENCOUNTER — PATIENT MESSAGE (OUTPATIENT)
Dept: FAMILY MEDICINE CLINIC | Age: 71
End: 2021-03-04

## 2021-03-04 RX ORDER — POTASSIUM CHLORIDE 750 MG/1
TABLET, FILM COATED, EXTENDED RELEASE ORAL
Qty: 90 TABLET | Refills: 2 | Status: SHIPPED | OUTPATIENT
Start: 2021-03-04 | End: 2022-03-09 | Stop reason: SDUPTHER

## 2021-03-04 NOTE — TELEPHONE ENCOUNTER
From: Kylee Gentile Even  To: Raleigh Carpio MD  Sent: 3/4/2021 11:18 AM EST  Subject: Prescription Question    Dr. Wm Dupont, Please refill my prescription for Klor-Con. .    Thank Lulú Vazquez Even

## 2021-03-11 RX ORDER — L-METHYLFOLATE-ALGAE-VIT B12-B6 CAP 3-90.314-2-35 MG 3-90.314-2-35 MG
CAP ORAL
Qty: 180 CAPSULE | Refills: 3 | Status: SHIPPED | OUTPATIENT
Start: 2021-03-11 | End: 2021-10-20

## 2021-03-31 RX ORDER — ATORVASTATIN CALCIUM 80 MG/1
TABLET, FILM COATED ORAL
Qty: 90 TABLET | Refills: 1 | Status: SHIPPED | OUTPATIENT
Start: 2021-03-31 | End: 2021-09-23 | Stop reason: SDUPTHER

## 2021-04-05 RX ORDER — OMEPRAZOLE 20 MG/1
CAPSULE, DELAYED RELEASE ORAL
Qty: 90 CAPSULE | Refills: 0 | Status: SHIPPED | OUTPATIENT
Start: 2021-04-05 | End: 2021-06-28

## 2021-04-12 RX ORDER — TRAZODONE HYDROCHLORIDE 100 MG/1
TABLET ORAL
Qty: 270 TABLET | Refills: 0 | Status: SHIPPED | OUTPATIENT
Start: 2021-04-12 | End: 2021-07-12

## 2021-04-15 ENCOUNTER — PATIENT MESSAGE (OUTPATIENT)
Dept: FAMILY MEDICINE CLINIC | Age: 71
End: 2021-04-15

## 2021-04-15 DIAGNOSIS — F41.8 SITUATIONAL ANXIETY: ICD-10-CM

## 2021-04-15 DIAGNOSIS — R97.20 ELEVATED PSA: Primary | ICD-10-CM

## 2021-04-15 RX ORDER — HYDROCHLOROTHIAZIDE 25 MG/1
TABLET ORAL
Qty: 90 TABLET | Refills: 0 | Status: SHIPPED | OUTPATIENT
Start: 2021-04-15 | End: 2021-07-12

## 2021-04-15 RX ORDER — DIAZEPAM 10 MG/1
TABLET ORAL
Qty: 1 TABLET | Refills: 0 | Status: SHIPPED | OUTPATIENT
Start: 2021-04-15 | End: 2021-04-16

## 2021-04-15 RX ORDER — DULOXETIN HYDROCHLORIDE 60 MG/1
CAPSULE, DELAYED RELEASE ORAL
Qty: 90 CAPSULE | Refills: 0 | Status: SHIPPED | OUTPATIENT
Start: 2021-04-15 | End: 2021-05-24 | Stop reason: SDUPTHER

## 2021-04-15 NOTE — TELEPHONE ENCOUNTER
From: Yana Bowens Even  To: Yasmin Hunter MD  Sent: 4/15/2021 10:18 AM EDT  Subject: Prescription Question    Good morning Dr. Negrita Andrade,    I am having a prostate biopsy on Wednesday and the  recommended I take valium before the procedure   Could you call that in to [facility].  Thank you and I hope you are doing well    Sincerely,    Randy Trevino Even

## 2021-04-16 ASSESSMENT — LIFESTYLE VARIABLES
HOW OFTEN DURING THE LAST YEAR HAVE YOU NEEDED AN ALCOHOLIC DRINK FIRST THING IN THE MORNING TO GET YOURSELF GOING AFTER A NIGHT OF HEAVY DRINKING: 0
HAVE YOU OR SOMEONE ELSE BEEN INJURED AS A RESULT OF YOUR DRINKING: 0
HOW OFTEN DURING THE LAST YEAR HAVE YOU BEEN UNABLE TO REMEMBER WHAT HAPPENED THE NIGHT BEFORE BECAUSE YOU HAD BEEN DRINKING: LESS THAN MONTHLY
AUDIT TOTAL SCORE: 0
HOW OFTEN DURING THE LAST YEAR HAVE YOU FOUND THAT YOU WERE NOT ABLE TO STOP DRINKING ONCE YOU HAD STARTED: 0
HOW OFTEN DO YOU HAVE A DRINK CONTAINING ALCOHOL: FOUR OR MORE TIMES A WEEK
AUDIT-C TOTAL SCORE: 0
HOW OFTEN DURING THE LAST YEAR HAVE YOU NEEDED AN ALCOHOLIC DRINK FIRST THING IN THE MORNING TO GET YOURSELF GOING AFTER A NIGHT OF HEAVY DRINKING: NEVER
HOW MANY STANDARD DRINKS CONTAINING ALCOHOL DO YOU HAVE ON A TYPICAL DAY: THREE OR FOUR
HOW OFTEN DO YOU HAVE SIX OR MORE DRINKS ON ONE OCCASION: 0
HOW OFTEN DURING THE LAST YEAR HAVE YOU FAILED TO DO WHAT WAS NORMALLY EXPECTED FROM YOU BECAUSE OF DRINKING: NEVER
AUDIT TOTAL SCORE: 9
HOW OFTEN DURING THE LAST YEAR HAVE YOU HAD A FEELING OF GUILT OR REMORSE AFTER DRINKING: 1
HOW OFTEN DURING THE LAST YEAR HAVE YOU HAD A FEELING OF GUILT OR REMORSE AFTER DRINKING: LESS THAN MONTHLY
HAVE YOU OR SOMEONE ELSE BEEN INJURED AS A RESULT OF YOUR DRINKING: NO
HOW OFTEN DURING THE LAST YEAR HAVE YOU BEEN UNABLE TO REMEMBER WHAT HAPPENED THE NIGHT BEFORE BECAUSE YOU HAD BEEN DRINKING: 1
HOW OFTEN DO YOU HAVE SIX OR MORE DRINKS ON ONE OCCASION: NEVER
AUDIT-C TOTAL SCORE: 5
HOW OFTEN DURING THE LAST YEAR HAVE YOU FOUND THAT YOU WERE NOT ABLE TO STOP DRINKING ONCE YOU HAD STARTED: NEVER
HAS A RELATIVE, FRIEND, DOCTOR, OR ANOTHER HEALTH PROFESSIONAL EXPRESSED CONCERN ABOUT YOUR DRINKING OR SUGGESTED YOU CUT DOWN: YES, BUT NOT IN THE PAST YEAR

## 2021-04-16 ASSESSMENT — PATIENT HEALTH QUESTIONNAIRE - PHQ9
SUM OF ALL RESPONSES TO PHQ QUESTIONS 1-9: 0
2. FEELING DOWN, DEPRESSED OR HOPELESS: 0
SUM OF ALL RESPONSES TO PHQ9 QUESTIONS 1 & 2: 0

## 2021-04-21 RX ORDER — FUROSEMIDE 40 MG/1
TABLET ORAL
Qty: 90 TABLET | Refills: 0 | Status: SHIPPED | OUTPATIENT
Start: 2021-04-21 | End: 2022-01-03

## 2021-04-23 ENCOUNTER — OFFICE VISIT (OUTPATIENT)
Dept: FAMILY MEDICINE CLINIC | Age: 71
End: 2021-04-23
Payer: MEDICARE

## 2021-04-23 VITALS
DIASTOLIC BLOOD PRESSURE: 74 MMHG | HEIGHT: 76 IN | BODY MASS INDEX: 33.1 KG/M2 | HEART RATE: 84 BPM | TEMPERATURE: 97.8 F | WEIGHT: 271.8 LBS | OXYGEN SATURATION: 95 % | SYSTOLIC BLOOD PRESSURE: 126 MMHG

## 2021-04-23 DIAGNOSIS — F33.40 RECURRENT MAJOR DEPRESSIVE DISORDER, IN REMISSION (HCC): ICD-10-CM

## 2021-04-23 DIAGNOSIS — M79.604 RIGHT LEG PAIN: ICD-10-CM

## 2021-04-23 DIAGNOSIS — R07.9 CHEST PAIN, UNSPECIFIED TYPE: ICD-10-CM

## 2021-04-23 DIAGNOSIS — M51.37 DEGENERATION OF LUMBAR OR LUMBOSACRAL INTERVERTEBRAL DISC: ICD-10-CM

## 2021-04-23 DIAGNOSIS — M46.96 UNSPECIFIED INFLAMMATORY SPONDYLOPATHY, LUMBAR REGION (HCC): ICD-10-CM

## 2021-04-23 DIAGNOSIS — M47.816 ARTHROPATHY OF LUMBAR FACET JOINT: ICD-10-CM

## 2021-04-23 DIAGNOSIS — C61 PROSTATE CANCER (HCC): ICD-10-CM

## 2021-04-23 DIAGNOSIS — I65.23 BILATERAL CAROTID ARTERY STENOSIS: ICD-10-CM

## 2021-04-23 DIAGNOSIS — M51.36 DDD (DEGENERATIVE DISC DISEASE), LUMBAR: ICD-10-CM

## 2021-04-23 DIAGNOSIS — Z00.00 MEDICARE ANNUAL WELLNESS VISIT, INITIAL: Primary | ICD-10-CM

## 2021-04-23 DIAGNOSIS — Z00.00 ROUTINE GENERAL MEDICAL EXAMINATION AT A HEALTH CARE FACILITY: ICD-10-CM

## 2021-04-23 DIAGNOSIS — M16.10 ARTHRITIS, HIP: ICD-10-CM

## 2021-04-23 PROBLEM — I77.9 BILATERAL CAROTID ARTERY DISEASE (HCC): Status: RESOLVED | Noted: 2020-11-11 | Resolved: 2021-04-23

## 2021-04-23 PROCEDURE — 1123F ACP DISCUSS/DSCN MKR DOCD: CPT | Performed by: FAMILY MEDICINE

## 2021-04-23 PROCEDURE — 3017F COLORECTAL CA SCREEN DOC REV: CPT | Performed by: FAMILY MEDICINE

## 2021-04-23 PROCEDURE — 93000 ELECTROCARDIOGRAM COMPLETE: CPT | Performed by: FAMILY MEDICINE

## 2021-04-23 PROCEDURE — G0438 PPPS, INITIAL VISIT: HCPCS | Performed by: FAMILY MEDICINE

## 2021-04-23 PROCEDURE — 4040F PNEUMOC VAC/ADMIN/RCVD: CPT | Performed by: FAMILY MEDICINE

## 2021-04-23 RX ORDER — GABAPENTIN 800 MG/1
800 TABLET ORAL 3 TIMES DAILY
Qty: 270 TABLET | Refills: 1 | Status: SHIPPED | OUTPATIENT
Start: 2021-04-23 | End: 2021-10-28

## 2021-04-23 NOTE — PATIENT INSTRUCTIONS
Personalized Preventive Plan for Marianne Agosto - 4/23/2021  Medicare offers a range of preventive health benefits. Some of the tests and screenings are paid in full while other may be subject to a deductible, co-insurance, and/or copay. Some of these benefits include a comprehensive review of your medical history including lifestyle, illnesses that may run in your family, and various assessments and screenings as appropriate. After reviewing your medical record and screening and assessments performed today your provider may have ordered immunizations, labs, imaging, and/or referrals for you. A list of these orders (if applicable) as well as your Preventive Care list are included within your After Visit Summary for your review. Other Preventive Recommendations:    · A preventive eye exam performed by an eye specialist is recommended every 1-2 years to screen for glaucoma; cataracts, macular degeneration, and other eye disorders. · A preventive dental visit is recommended every 6 months. · Try to get at least 150 minutes of exercise per week or 10,000 steps per day on a pedometer . · Order or download the FREE \"Exercise & Physical Activity: Your Everyday Guide\" from [employer]. Call 8-725.866.9540 or search [employer] online. · You need 5997-6923 mg of calcium and 1760-4408 IU of vitamin D per day. It is possible to meet your calcium requirement with diet alone, but a vitamin D supplement is usually necessary to meet this goal.  · When exposed to the sun, use a sunscreen that protects against both UVA and UVB radiation with an SPF of 30 or greater. Reapply every 2 to 3 hours or after sweating, drying off with a towel, or swimming. · Always wear a seat belt when traveling in a car. Always wear a helmet when riding a bicycle or motorcycle.

## 2021-04-23 NOTE — PROGRESS NOTES
Colonoscopy  - may need another soon  Had covid vaccine - utd on pneumonia, shingles, tdap and flu  Labs 10/27 - cmp okay - ldl 113, hdl 56, tg 119  W/ good b12/ folate/d  Medicare Annual Wellness Visit  Name: Octaviano Simmonds JVRZHX Date: 2021   MRN: [id number] Sex: Male   Age: 66 y.o. Ethnicity: Non-/Non    : 1950 Race: Nithin Agosto is here for Medicare AWV (MEDICARE ANNUAL WELLNESS VISIT - HAS JUST A COUPLE QUESTIONS ABOUT HIS MEDICATIONS- AND WOULD LIKE TO  [facility] KNOW ABOUT A PROSTATE BIOPSY HE HAD DONE ON WEDNESDAY. 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 Wt Readings from Last 3 Encounters:   21 271 lb 12.8 oz (123.3 kg)   21 275 lb (124.7 kg)   20 275 lb (124.7 kg)     BP Readings from Last 3 Encounters:   21 126/74   21 120/78   20 128/70     Pulse Readings from Last 3 Encounters:   21 84   20 71   20 67     Not much swelling but pain medial right lower leg to ankle - tried brace. Pain hurts at night in bed - burning pain - no low back pain issues. No weakness. Sleeping okay for most part lately - had problems for awhile  Acid reflux controlled on ppi  Experienced some squeezing in middle of chest this week -not really related to activity  Had prostate bx 2 days ago - - multiple bx sites showed lindsey 7 prostate cancer  No urinary sx  bm's okay.   No cough/ sob/ palpitations  Labs reviewed -had bilat carotid changes on screening  Hearing aids but not using - hears okay - hard in loud environments  Couple cups coffee in am - no soda  Drinking couple drinks nightly  Neuropathy about the same overall - not thinking about it on gabapentin/ duloxetin  Screenings for behavioral, psychosocial and functional/safety risks, and cognitive dysfunction are all negative except as indicated below. These results, as well as other patient data from the [address] form, are documented in Flowsheets linked to this Encounter. No Known Allergies    Prior to Visit Medications    Medication Sig Taking? Authorizing Provider   furosemide (LASIX) 40 MG tablet TAKE 1 TABLET BY MOUTH EVERY DAY Yes Danica Rodrigez MD   hydroCHLOROthiazide (HYDRODIURIL) 25 MG tablet TAKE 1 TABLET BY MOUTH EVERY DAY Yes Danica Rodrigez MD   DULoxetine (CYMBALTA) 60 MG extended release capsule TAKE 1 CAPSULE BY MOUTH EVERY DAY Yes Danica Rodrigez MD   traZODone (DESYREL) 100 MG tablet TAKE 2 TO 3 TABLETS BY MOUTH NIGHTLY. Yes Danica Rodrigez MD   omeprazole (PRILOSEC) 20 MG delayed release capsule TAKE 1 CAPSULE BY MOUTH EVERY DAY Yes SARA Shukla CNP   atorvastatin (LIPITOR) 80 MG tablet TAKE 1 TABLET BY MOUTH EVERY DAY Yes Danica Rodrigez MD   C-ljpxzzxfxnfk-Y2-B12 [facility]) 5-76.714-8-89 MG CAPS capsule TAKE 1 CAPSULE BY MOUTH TWO TIMES A DAY Yes Danica Rodrigez MD   potassium chloride (KLOR-CON 10) 10 MEQ extended release tablet TAKE 1 TABLET BY MOUTH EVERY DAY Yes Danica Rodrigez MD   tiZANidine (ZANAFLEX) 4 MG tablet TAKE 2 TABLETS BY MOUTH EVERY DAY IN THE EVENING Yes SARA Shukla CNP   tamsulosin (FLOMAX) 0.4 MG capsule TAKE 1 CAPSULE BY MOUTH EVERY DAY Yes SARA Shukla CNP   metoprolol succinate (TOPROL XL) 100 MG extended release tablet TAKE 1.5 TABLETS BY MOUTH EVERY DAY Yes SARA Sun CNP   telmisartan (MICARDIS) 80 MG tablet TAKE 1 TABLET BY MOUTH EVERY DAY Yes Danica Rodrigez MD   finasteride (PROSCAR) 5 MG tablet TAKE 1 TABLET BY MOUTH EVERY DAY Yes Danica Rodrigez MD   oxybutynin (DITROPAN-XL) 10 MG extended release tablet TAKE 1 TABLET BY MOUTH EVERY DAY Yes Danica Rodrigez MD   gabapentin (NEURONTIN) 800 MG tablet Take 1 tablet by mouth 3 times daily for 180 days.  Yes Danica Rodrigez MD Pseudoephedrine-Guaifenesin (MUCINEX D PO) Take 1 tablet by mouth daily. Yes Historical Provider, MD   vitamin D-3 (CHOLECALCIFEROL) 5000 UNITS TABS Take 5,000 Units by mouth daily.  Yes Historical Provider, MD       Past Medical History:   Diagnosis Date    Arthritis, hip 6/29/2011    Arthropathy of lumbar facet joint 7/23/2013    BPH (benign prostatic hyperplasia) 6/29/2011    Current smoker     DDD (degenerative disc disease), lumbar 6/29/2011    Degeneration of lumbar or lumbosacral intervertebral disc 9/24/2013    Depression 11/11/2011    Hypercholesteremia 6/29/2011    Hyperlipidemia     Hypertension     Insomnia     Liver disease     Obstructive sleep apnea on CPAP     Painful respiration     Peyronie disease 6/29/2011       Past Surgical History:   Procedure Laterality Date    COLONOSCOPY  2008    normal,    COLONOSCOPY  2018    [facility]    FINGER SURGERY Right 06/05/2020    Dupuytren's contraction deformity of rt 5th finger corrected    [facility]    [facility]    TONSILLECTOMY AND ADENOIDECTOMY         Family History   Problem Relation Age of Onset    Heart Failure Mother     Diabetes type 2  Mother     Lung Cancer Father         smoker    Dementia Sister     Heart Surgery Brother     Coronary Art Dis Brother     High Cholesterol Brother     Hypertension Brother     Kidney Cancer Son 41        liver cancer       CareTeam (Including outside providers/suppliers regularly involved in providing care):   Patient Care Team:  Everardo Pope MD as PCP - General (Family Medicine)  Everardo Pope MD as PCP - [facility] Empaneled Provider    Wt Readings from Last 3 Encounters:   04/23/21 271 lb 12.8 oz (123.3 kg)   02/01/21 275 lb (124.7 kg)   08/27/20 275 lb (124.7 kg)     Vitals:    04/23/21 1403   BP: 126/74   Site: Left Upper Arm   Position: Sitting   Cuff Size: Medium Adult   Pulse: 84   Temp: 97.8 °F (36.6 °C)   TempSrc: Temporal   SpO2: 95%   Weight: 271 lb 12.8 oz (123.3 kg)   Height: 6' 4\" (1.93 m)     Body mass index is 33.08 kg/m². Based upon direct observation of the patient, evaluation of cognition reveals recent and remote memory intact. occ forgetfulness  Tries to keep mind active. Some balance issues - w/ leg - has to wait after standing before walking  Getting eye exam soon - may get lid lift    Going to [facility] for leg / ankle pain -medial    Patient's complete Health Risk Assessment and screening values have been reviewed and are found in Flowsheets. The following problems were reviewed today and where indicated follow up appointments were made and/or referrals ordered. Positive Risk Factor Screenings with Interventions:         Substance History:  Social History     Tobacco History     Smoking Status  Former Smoker Quit date  1/1/1974 Smoking Frequency  2 packs/day for 8 years (16 pk yrs)    Smokeless Tobacco Use  Never Used    Tobacco Comment  quit smoking 40 years ago          Alcohol History     Alcohol Use Status  Yes Drinks/Week  28 Shots of liquor per week Amount  28.0 standard drinks of alcohol/wk          Drug Use     Drug Use Status  No          Sexual Activity     Sexually Active  Not Asked               Alcohol Screening: Audit-C Score: 5  Total Score: 9    A score of 8 or more is associated with harmful or hazardous drinking. A score of 13 or more in women, and 15 or more in men, is likely to indicate alcohol dependence. Substance Abuse Interventions:  · alcohol moderation dw/ pt    General Health and ACP:  General  In general, how would you say your health is?: Fair  In the past 7 days, have you experienced any of the following?  New or Increased Pain, New or Increased Fatigue, Loneliness, Social Isolation, Stress or Anger?: (!) Loneliness  Do you get the social and emotional support that you need?: Yes  Do you have a Living Will?: Yes  Advance Directives     Power of  Living Will ACP-Advance Directive ACP-Power of [employer] Not on File Not on File Not on File Not on File      General Health Risk Interventions:  · lw/ dpoa addressed    Health Habits/Nutrition:  Health Habits/Nutrition  Do you exercise for at least 20 minutes 2-3 times per week?: (!) No  Have you lost any weight without trying in the past 3 months?: No  Do you eat only one meal per day?: No  Have you seen the dentist within the past year?: Yes  Body mass index: (!) 33.08  Health Habits/Nutrition Interventions:  · diet/ exercise dw/ pt    Hearing/Vision:  No exam data present  Hearing/Vision  Do you or your family notice any trouble with your hearing that hasn't been managed with hearing aids?: No  Do you have difficulty driving, watching TV, or doing any of your daily activities because of your eyesight?: No  Have you had an eye exam within the past year?: (!) No  Hearing/Vision Interventions:  · eye exam soon - hearing aids dw pt    Safety:  Safety  Do you have working smoke detectors?: Yes  Have all throw rugs been removed or fastened?: (!) No  Do you have non-slip mats or surfaces in all bathtubs/showers?: Yes  Do all of your stairways have a railing or banister?: Yes  Are your doorways, halls and stairs free of clutter?: Yes  Do you always fasten your seatbelt when you are in a car?: Yes  Safety Interventions:  · Home safety tips provided    ADL:  ADLs  In the past 7 days, did you need help from others to perform any of the following everyday activities? Eating, dressing, grooming, bathing, toileting, or walking/balance?: None  In the past 7 days, did you need help from others to take care of any of the following?  Laundry, housekeeping, banking/finances, shopping, telephone use, food preparation, transportation, or taking medications?: (!) Laundry, Housekeeping  ADL Interventions:  · Patient declines any further evaluation/treatment for this issue    Personalized Preventive Plan   Current Health Maintenance Status  Immunization History   Administered Date(s) 5. Chest pain, unspecified type  EKG 12 Lead   6. Bilateral carotid artery stenosis     7. Prostate cancer ([facility] [address]       Consider emg for leg pain -will see [facility] for further eval  Carotid scan   utd on immunizations  F/u urology next week regarding lindsey 7 prostate ca  ekg today  Further testing if recurrent sxs or if ekg okay.   F/u pending u/s  Labs reviewed w/ pt  Cont cymbalta for mood   Neuropathy stable  Refill tony brownentin

## 2021-05-06 ENCOUNTER — HOSPITAL ENCOUNTER (OUTPATIENT)
Dept: VASCULAR LAB | Age: 71
Discharge: HOME | End: 2021-05-06
Payer: MEDICARE

## 2021-05-06 DIAGNOSIS — I65.23 BILATERAL CAROTID ARTERY STENOSIS: ICD-10-CM

## 2021-05-06 PROCEDURE — 93880 EXTRACRANIAL BILAT STUDY: CPT

## 2021-05-20 RX ORDER — TIZANIDINE 4 MG/1
TABLET ORAL
Qty: 180 TABLET | Refills: 0 | Status: SHIPPED | OUTPATIENT
Start: 2021-05-20 | End: 2021-08-25

## 2021-05-24 RX ORDER — DULOXETIN HYDROCHLORIDE 60 MG/1
CAPSULE, DELAYED RELEASE ORAL
Qty: 90 CAPSULE | Refills: 0 | Status: SHIPPED | OUTPATIENT
Start: 2021-05-24 | End: 2021-09-17

## 2021-06-04 RX ORDER — TAMSULOSIN HYDROCHLORIDE 0.4 MG/1
CAPSULE ORAL
Qty: 90 CAPSULE | Refills: 0 | Status: SHIPPED | OUTPATIENT
Start: 2021-06-04 | End: 2021-08-30

## 2021-06-28 RX ORDER — OMEPRAZOLE 20 MG/1
CAPSULE, DELAYED RELEASE ORAL
Qty: 90 CAPSULE | Refills: 0 | Status: SHIPPED | OUTPATIENT
Start: 2021-06-28 | End: 2021-09-23 | Stop reason: SDUPTHER

## 2021-06-28 RX ORDER — TELMISARTAN 80 MG/1
TABLET ORAL
Qty: 90 TABLET | Refills: 1 | Status: SHIPPED | OUTPATIENT
Start: 2021-06-28 | End: 2021-12-28

## 2021-07-12 RX ORDER — TRAZODONE HYDROCHLORIDE 100 MG/1
TABLET ORAL
Qty: 270 TABLET | Refills: 2 | Status: SHIPPED | OUTPATIENT
Start: 2021-07-12 | End: 2022-04-04

## 2021-07-12 RX ORDER — HYDROCHLOROTHIAZIDE 25 MG/1
TABLET ORAL
Qty: 90 TABLET | Refills: 2 | Status: SHIPPED | OUTPATIENT
Start: 2021-07-12 | End: 2022-04-18

## 2021-07-12 NOTE — TELEPHONE ENCOUNTER
PER SASCHA LAST OV ON 4/23/2021:     Return in 6 months (on 10/23/2021) for Medicare Annual Wellness Visit in 1 year.

## 2021-08-25 RX ORDER — TIZANIDINE 4 MG/1
TABLET ORAL
Qty: 180 TABLET | Refills: 0 | Status: SHIPPED | OUTPATIENT
Start: 2021-08-25 | End: 2021-11-23

## 2021-08-30 ENCOUNTER — OFFICE VISIT (OUTPATIENT)
Dept: SLEEP MEDICINE | Age: 71
End: 2021-08-30
Payer: MEDICARE

## 2021-08-30 VITALS
HEART RATE: 68 BPM | BODY MASS INDEX: 33.85 KG/M2 | SYSTOLIC BLOOD PRESSURE: 110 MMHG | RESPIRATION RATE: 18 BRPM | TEMPERATURE: 97.7 F | WEIGHT: 278 LBS | OXYGEN SATURATION: 93 % | DIASTOLIC BLOOD PRESSURE: 82 MMHG | HEIGHT: 76 IN

## 2021-08-30 DIAGNOSIS — G47.33 OSA ON CPAP: Primary | ICD-10-CM

## 2021-08-30 DIAGNOSIS — Z99.89 DEPENDENCE ON OTHER ENABLING MACHINES AND DEVICES: ICD-10-CM

## 2021-08-30 DIAGNOSIS — I10 ESSENTIAL HYPERTENSION: ICD-10-CM

## 2021-08-30 DIAGNOSIS — Z99.89 OSA ON CPAP: Primary | ICD-10-CM

## 2021-08-30 PROCEDURE — 1123F ACP DISCUSS/DSCN MKR DOCD: CPT | Performed by: PSYCHIATRY & NEUROLOGY

## 2021-08-30 PROCEDURE — 1036F TOBACCO NON-USER: CPT | Performed by: PSYCHIATRY & NEUROLOGY

## 2021-08-30 PROCEDURE — 4040F PNEUMOC VAC/ADMIN/RCVD: CPT | Performed by: PSYCHIATRY & NEUROLOGY

## 2021-08-30 PROCEDURE — G8427 DOCREV CUR MEDS BY ELIG CLIN: HCPCS | Performed by: PSYCHIATRY & NEUROLOGY

## 2021-08-30 PROCEDURE — 3017F COLORECTAL CA SCREEN DOC REV: CPT | Performed by: PSYCHIATRY & NEUROLOGY

## 2021-08-30 PROCEDURE — 99214 OFFICE O/P EST MOD 30 MIN: CPT | Performed by: PSYCHIATRY & NEUROLOGY

## 2021-08-30 PROCEDURE — G8417 CALC BMI ABV UP PARAM F/U: HCPCS | Performed by: PSYCHIATRY & NEUROLOGY

## 2021-08-30 RX ORDER — TAMSULOSIN HYDROCHLORIDE 0.4 MG/1
CAPSULE ORAL
Qty: 90 CAPSULE | Refills: 0 | Status: SHIPPED | OUTPATIENT
Start: 2021-08-30 | End: 2021-11-23

## 2021-08-30 ASSESSMENT — SLEEP AND FATIGUE QUESTIONNAIRES
HOW LIKELY ARE YOU TO NOD OFF OR FALL ASLEEP WHILE SITTING AND READING: 0
ESS TOTAL SCORE: 1
HOW LIKELY ARE YOU TO NOD OFF OR FALL ASLEEP WHEN YOU ARE A PASSENGER IN A CAR FOR AN HOUR WITHOUT A BREAK: 1
HOW LIKELY ARE YOU TO NOD OFF OR FALL ASLEEP WHILE WATCHING TV: 0
HOW LIKELY ARE YOU TO NOD OFF OR FALL ASLEEP WHILE LYING DOWN TO REST IN THE AFTERNOON WHEN CIRCUMSTANCES PERMIT: 0
HOW LIKELY ARE YOU TO NOD OFF OR FALL ASLEEP IN A CAR, WHILE STOPPED FOR A FEW MINUTES IN TRAFFIC: 0
HOW LIKELY ARE YOU TO NOD OFF OR FALL ASLEEP WHILE SITTING INACTIVE IN A PUBLIC PLACE: 0
HOW LIKELY ARE YOU TO NOD OFF OR FALL ASLEEP WHILE SITTING QUIETLY AFTER LUNCH WITHOUT ALCOHOL: 0
HOW LIKELY ARE YOU TO NOD OFF OR FALL ASLEEP WHILE SITTING AND TALKING TO SOMEONE: 0

## 2021-08-30 NOTE — PATIENT INSTRUCTIONS
Patient Education        Learning About CPAP for Sleep Apnea  What is CPAP? CPAP is a small machine that you use at home every night while you sleep. It increases air pressure in your throat to keep your airway open. When you have sleep apnea, this can help you sleep better so you feel much better. CPAP stands for \"continuous positive airway pressure. \"  The CPAP machine will have one of the following:  · A mask that covers your nose and mouth  · Prongs that fit into your nose  · A mask that covers your nose only, which is the most common type. This type is called NCPAP. The N stands for \"nasal.\"  Why is it done? CPAP is usually the best treatment for obstructive sleep apnea. It is the first treatment choice and the most widely used. CPAP:  · Helps you have more normal sleep, so you feel less sleepy and more alert during the daytime. · May help keep heart failure or other heart problems from getting worse. · May help lower your blood pressure. If you use CPAP, your bed partner may also sleep better. That's because you aren't snoring or restless. Your doctor may suggest CPAP if you have:  · Moderate to severe sleep apnea. · Sleep apnea and coronary artery disease (CAD). · Sleep apnea and heart failure. What are the side effects? Some people who use CPAP have:  · A dry or stuffy nose and a sore throat. · Irritated skin on the face. · Sore eyes. · Bloating. How can you care for yourself? If using CPAP is not comfortable, or if you have certain side effects, work with your doctor to fix them. Here are some things you can try:  · Be sure the mask or nasal prongs fit well. · See if your doctor can adjust the pressure of your CPAP. · If your nose is dry, try a humidifier. · If your nose is runny or stuffy, try decongestant medicine or a steroid nasal spray. Be safe with medicines. Read and follow all instructions on the label. Do not use the medicine longer than the label says.   If these things don't help, you might try a different type of machine. Some machines have air pressure that adjusts on its own. Others have air pressures that are different when you breathe in than when you breathe out. This may reduce discomfort caused by too much pressure in your nose. Where can you learn more? Go to [email or web address].[email or web address]. org and sign in to your [employer] account. Enter H377 in the DialMyApp box to learn more about \"Learning About CPAP for Sleep Apnea. \"     If you do not have an account, please click on the \"Sign Up Now\" link. Current as of: October 26, 2020               Content Version: 12.9  © 2006-2021 [employer]. Care instructions adapted under license by [facility] ([facility]). If you have questions about a medical condition or this instruction, always ask your healthcare professional. [address] any warranty or liability for your use of this information.

## 2021-08-30 NOTE — PROGRESS NOTES
MD SMOOTH Joseph Board Certified in Sleep Medicine  Certified in [address] [place] [address] Certified in Neurology [address]  1000 [facility] [place] [address] 1400 [facility],  Chon [address]  [phone]   515 [facility], [address] [place] [address] Ne                      [address]  382 [facility] [address] [phone]    Subjective:     Patient ID: Janette Agosto is a 66 y.o. male. Chief Complaint   Patient presents with    Follow-up     yearly CPAP f/u        HPI:        Janette Agosto is a 66 y.o. male was seen today as annual follow for severe obstructive sleep apnea with apnea hypopnea index of 75.2/hr with lowest O2 saturation of 67%, patient spent about 100.9 minutes below 90%. Patient is using the PAP machine about 100% of the time, more than 4 hours a nightabout  100 %, in total average of 9:35 hours a night in last 90 days. Currently on PAP at 13 cm (), the AHI is only 2.9 events per hour at this pressure. Patient improved regarding daytime sleepiness and fatigue, wakes up refreshed in the morning. The Patient scored Total score: 1 on [place] Sleepiness Scale ( more than 10 is indicative of daytime sleepiness)   Patient has no problem with PAP pressure or mask, uses FFM. BP is stable. DOT/CDL - N/A  Previous Report(s)Reviewed: historical medical records. Social History     Socioeconomic History    Marital status:       Spouse name: Not on file    Number of children: Not on file    Years of education: Not on file    Highest education level: Not on file   Occupational History    Occupation: Retired - owner business   Tobacco Use    Smoking status: Former Smoker     Packs/day: 2.00     Years: 8.00     Pack years: 16.00     Quit date:      Years since quittin.6    Smokeless tobacco: Never Used    Tobacco comment: quit smoking 40 years ago Vaping Use    Vaping Use: Never used   Substance and Sexual Activity    Alcohol use: Yes     Alcohol/week: 28.0 standard drinks     Types: 28 Shots of liquor per week    Drug use: No    Sexual activity: Not on file   Other Topics Concern    Not on file   Social History Narrative    Cares for wife who has breast and pancreatic cancer. Recent death of his son from liver and kidney cancer. No exercise program. 8/11/20     Social Determinants of Health     Financial Resource Strain:     Difficulty of Paying Living Expenses:    Food Insecurity:     Worried About Running Out of Food in the Last Year:     [address] in the Last Year:    Transportation Needs:     Lack of Transportation (Medical):  Lack of Transportation (Non-Medical):    Physical Activity:     Days of Exercise per Week:     Minutes of Exercise per Session:    Stress:     Feeling of Stress :    Social Connections:     Frequency of Communication with Friends and Family:     Frequency of Social Gatherings with Friends and Family:     Attends [employer] Services:     Active Member of Clubs or Organizations:     Attends Club or Organization Meetings:     Marital Status:    Intimate Partner Violence:     Fear of Current or Ex-Partner:     Emotionally Abused:     Physically Abused:     Sexually Abused:        Prior to Admission medications    Medication Sig Start Date End Date Taking? Authorizing Provider   tiZANidine (ZANAFLEX) 4 MG tablet TAKE 2 TABLETS BY MOUTH EVERY EVENING 8/25/21  Yes Arvind Iniguez MD   traZODone (DESYREL) 100 MG tablet TAKE 2 TO 3 TABLETS BY MOUTH NIGHTLY.  7/12/21  Yes Arvind Iniguez MD   hydroCHLOROthiazide (HYDRODIURIL) 25 MG tablet TAKE 1 TABLET BY MOUTH EVERY DAY 7/12/21  Yes Arvind Iniguez MD   telmisartan (MICARDIS) 80 MG tablet TAKE 1 TABLET BY MOUTH EVERY DAY 6/28/21  Yes Gayathri Lozano APRN - CNP   omeprazole (PRILOSEC) 20 MG delayed release capsule TAKE 1 CAPSULE BY MOUTH EVERY DAY 6/28/21 Yes SARA Wise CNP   tamsulosin (FLOMAX) 0.4 MG capsule TAKE 1 CAPSULE BY MOUTH EVERY DAY 6/4/21  Yes Veronica Dean MD   DULoxetine (CYMBALTA) 60 MG extended release capsule TAKE 1 CAPSULE BY MOUTH EVERY DAY 5/24/21  Yes Veronica Dean MD   gabapentin (NEURONTIN) 800 MG tablet Take 1 tablet by mouth 3 times daily for 180 days. 4/23/21 10/20/21 Yes Veronica Dean MD   furosemide (LASIX) 40 MG tablet TAKE 1 TABLET BY MOUTH EVERY DAY 4/21/21  Yes Veronica Dean MD   atorvastatin (LIPITOR) 80 MG tablet TAKE 1 TABLET BY MOUTH EVERY DAY 3/31/21  Yes Veronica Dean MD   L-methylfolate-B6-B12 [facility]) 3-48.422-2-53 MG CAPS capsule TAKE 1 CAPSULE BY MOUTH TWO TIMES A DAY 3/11/21  Yes Veronica Dean MD   potassium chloride (KLOR-CON 10) 10 MEQ extended release tablet TAKE 1 TABLET BY MOUTH EVERY DAY 3/4/21  Yes Veronica Dean MD   metoprolol succinate (TOPROL XL) 100 MG extended release tablet TAKE 1.5 TABLETS BY MOUTH EVERY DAY 2/1/21  Yes SARA Jay CNP   finasteride (PROSCAR) 5 MG tablet TAKE 1 TABLET BY MOUTH EVERY DAY 11/11/20  Yes Veronica Dean MD   oxybutynin (DITROPAN-XL) 10 MG extended release tablet TAKE 1 TABLET BY MOUTH EVERY DAY 11/11/20  Yes Veronica Dean MD   Pseudoephedrine-Guaifenesin [facility] D PO) Take 1 tablet by mouth daily. Yes Historical Provider, MD   vitamin D-3 (CHOLECALCIFEROL) 5000 UNITS TABS Take 5,000 Units by mouth daily.    Yes Historical Provider, MD       Allergies as of 08/30/2021    (No Known Allergies)       Patient Active Problem List   Diagnosis    Hypercholesteremia    Hypertension    Insomnia    DDD (degenerative disc disease), lumbar    Arthritis, hip    BPH (benign prostatic hyperplasia)    Peyronie disease    Depression    Arthropathy of lumbar facet joint    Degeneration of lumbar or lumbosacral intervertebral disc    OAB (overactive bladder)    Liver cyst    MATIAS (obstructive sleep apnea)    Unspecified inflammatory spondylopathy, lumbar [facility])    Recurrent major depressive disorder, in remission ([facility] [address]    Bilateral carotid artery stenosis       Past Medical History:   Diagnosis Date    Arthritis, hip 6/29/2011    Arthropathy of lumbar facet joint 7/23/2013    BPH (benign prostatic hyperplasia) 6/29/2011    Current smoker     DDD (degenerative disc disease), lumbar 6/29/2011    Degeneration of lumbar or lumbosacral intervertebral disc 9/24/2013    Depression 11/11/2011    Hypercholesteremia 6/29/2011    Hyperlipidemia     Hypertension     Insomnia     Liver disease     Obstructive sleep apnea on CPAP     Painful respiration     Peyronie disease 6/29/2011       Past Surgical History:   Procedure Laterality Date    COLONOSCOPY  2008    normal,    COLONOSCOPY  2018    [facility]    FINGER SURGERY Right 06/05/2020    Dupuytren's contraction deformity of rt 5th finger corrected    JOINT REPLACEMENT Left 2000    [facility]    TONSILLECTOMY AND ADENOIDECTOMY         Family History   Problem Relation Age of Onset    Heart Failure Mother     Diabetes type 2  Mother     Lung Cancer Father         smoker    Dementia Sister     Heart Surgery Brother     Coronary Art Dis Brother     High Cholesterol Brother     Hypertension Brother     Kidney Cancer Son 41        liver cancer       Review of Systems    Objective:     Vitals:  Weight BMI Neck circumference    Wt Readings from Last 3 Encounters:   08/30/21 278 lb (126.1 kg)   04/23/21 271 lb 12.8 oz (123.3 kg)   02/01/21 275 lb (124.7 kg)    Body mass index is 33.84 kg/m².        BP HR SaO2   BP Readings from Last 3 Encounters:   08/30/21 110/82   04/23/21 126/74   02/01/21 120/78    Pulse Readings from Last 3 Encounters:   08/30/21 68   04/23/21 84   08/27/20 71    SpO2 Readings from Last 3 Encounters:   08/30/21 93%   04/23/21 95%   08/27/20 94%        Themandibular molar Class :   []1 []2 []3      Mallampati I Airway Classification:   []1 []2 []3 []4      Physical Exam    :   Very severe Obstructive Sleep Apnea/Hypopnea Syndrome under good control on PAP at 13 cmwp. Diagnosis Orders   1. MATIAS on CPAP     2. Dependence on other enabling machines and devices     3. Essential hypertension       Plan: Will continue the PAP at 13 cmwp. Will register his Dream station machine, he can not sleep without the machine. I will order PAP supplies, mask, filters. Most likely treating the MATIAS has a positive impact on HTN control. We discussed the proportionality between weight and AHI. With 10% weight change, the AHI has a 27% proportionate change. With 20% weight change, the AHI has a 45-50% proportionate change. No orders of the defined types were placed in this encounter. Return in about 1 year (around 8/30/2022) for Reveiwing CPAP usage and compliance report and tro.     Abad Way MD  Medical Director [address] [place] [address]

## 2021-08-30 NOTE — PROGRESS NOTES
Elissa Jonathan Agosto         : 1950        PHONE: [phone] (home)   [] [facility]     [x] Roxanne Hernandez      [] Anastasia     []Mery    [] Hussain Card  [] [facility]     [] Other:    Diagnosis: [x] MATIAS (G47.33) [] CSA (G47.31) [] Apnea (G47.30)   Length of Need: [] 12 Months [x] 99 Months [] Other:    Machine (GIORGI!): [] Respironics Dream Station      Auto [] ResMed AirSense     Auto [] Other:     []  CPAP () [] Bilevel ()   Mode: [] Auto [] Spontaneous    Mode: [] Auto [] Spontaneous                            Comfort Settings:   - Ramp Pressure: 5 cmH2O                                        - Ramp time: 15 min                                     -  Flex/EPR - 3 full time                                    - For ResMed Bilevel (TiMax-4 sec   TiMin- 0.2 sec)     Humidifier: [] Heated ()        [x] Water chamber replacement ()/ 1 per 6 months        Mask:   [] Nasal () /1 per 3 months [x] Full Face () /1 per 3 months   [] Patient choice -Size and fit mask [x] Patient Choice - Size and fit mask   [] Dispense:  [] Dispense:    [] Headgear () / 1 per 3 months [x] Headgear () / 1 per 3 months   [] Replacement Nasal Cushion ()/2 per month [x] Interface Replacement ()/1 per month   [] Replacement Nasal Pillows ()/2 per month         Tubing: [x] Heated ()/1 per 3 months    [] Standard ()/1 per 3 months [] Other:           Filters: [x] Non-disposable ()/1 per 6 months     [x] Ultra-Fine, Disposable ()/2 per month        Miscellaneous: [] Chin Strap ()/ 1 per 6 months [] O2 bleed-in:       LPM   [] Oximetry on CPAP/Bilevel []  Other:          Start Order Date: 21    MEDICAL JUSTIFICATION:  I, the undersigned, certify that the above prescribed supplies are medically necessary for this patients wellbeing.   In my opinion, the supplies are both reasonable and necessary in reference to accepted standards of medicalpractice in treatment of this patients condition.     Ashley Jauregui MD      NPI: [id number]       Order Signed Date: 08/30/21    Electronically signed by Ashley Jauregui MD on 8/30/2021 at 11:00 AM

## 2021-09-17 RX ORDER — METOPROLOL SUCCINATE 100 MG/1
TABLET, EXTENDED RELEASE ORAL
Qty: 135 TABLET | Refills: 0 | Status: SHIPPED | OUTPATIENT
Start: 2021-09-17 | End: 2021-12-09

## 2021-09-17 RX ORDER — DULOXETIN HYDROCHLORIDE 60 MG/1
CAPSULE, DELAYED RELEASE ORAL
Qty: 90 CAPSULE | Refills: 0 | Status: SHIPPED | OUTPATIENT
Start: 2021-09-17 | End: 2022-01-04

## 2021-09-23 ENCOUNTER — TELEPHONE (OUTPATIENT)
Dept: FAMILY MEDICINE CLINIC | Age: 71
End: 2021-09-23

## 2021-09-23 RX ORDER — OMEPRAZOLE 20 MG/1
CAPSULE, DELAYED RELEASE ORAL
Qty: 90 CAPSULE | Refills: 0 | Status: SHIPPED | OUTPATIENT
Start: 2021-09-23 | End: 2021-12-20

## 2021-09-23 RX ORDER — ATORVASTATIN CALCIUM 80 MG/1
TABLET, FILM COATED ORAL
Qty: 90 TABLET | Refills: 0 | Status: SHIPPED | OUTPATIENT
Start: 2021-09-23 | End: 2021-12-20

## 2021-10-20 ENCOUNTER — OFFICE VISIT (OUTPATIENT)
Dept: FAMILY MEDICINE CLINIC | Age: 71
End: 2021-10-20
Payer: MEDICARE

## 2021-10-20 VITALS
HEIGHT: 76 IN | DIASTOLIC BLOOD PRESSURE: 74 MMHG | OXYGEN SATURATION: 100 % | HEART RATE: 74 BPM | BODY MASS INDEX: 33.85 KG/M2 | SYSTOLIC BLOOD PRESSURE: 130 MMHG | WEIGHT: 278 LBS | TEMPERATURE: 97.7 F

## 2021-10-20 DIAGNOSIS — M25.422 SWELLING OF LEFT ELBOW JOINT: ICD-10-CM

## 2021-10-20 DIAGNOSIS — I10 PRIMARY HYPERTENSION: ICD-10-CM

## 2021-10-20 DIAGNOSIS — C61 PROSTATE CANCER (HCC): ICD-10-CM

## 2021-10-20 DIAGNOSIS — Z01.818 PRE-OP EXAM: Primary | ICD-10-CM

## 2021-10-20 DIAGNOSIS — R97.20 ELEVATED PSA: ICD-10-CM

## 2021-10-20 DIAGNOSIS — R73.01 IMPAIRED FASTING GLUCOSE: ICD-10-CM

## 2021-10-20 DIAGNOSIS — E78.00 HYPERCHOLESTEREMIA: ICD-10-CM

## 2021-10-20 DIAGNOSIS — Z12.5 PROSTATE CANCER SCREENING: ICD-10-CM

## 2021-10-20 LAB
A/G RATIO: 2.3 (ref 1.1–2.2)
ALBUMIN SERPL-MCNC: 4.4 G/DL (ref 3.4–5)
ALP BLD-CCNC: 85 U/L (ref 40–129)
ALT SERPL-CCNC: 27 U/L (ref 10–40)
ANION GAP SERPL CALCULATED.3IONS-SCNC: 11 MMOL/L (ref 3–16)
AST SERPL-CCNC: 18 U/L (ref 15–37)
BASOPHILS ABSOLUTE: 0 K/UL (ref 0–0.2)
BASOPHILS RELATIVE PERCENT: 1 %
BILIRUB SERPL-MCNC: 0.5 MG/DL (ref 0–1)
BUN BLDV-MCNC: 19 MG/DL (ref 7–20)
CALCIUM SERPL-MCNC: 9.4 MG/DL (ref 8.3–10.6)
CHLORIDE BLD-SCNC: 97 MMOL/L (ref 99–110)
CHOLESTEROL, TOTAL: 184 MG/DL (ref 0–199)
CO2: 27 MMOL/L (ref 21–32)
CREAT SERPL-MCNC: 1 MG/DL (ref 0.8–1.3)
EOSINOPHILS ABSOLUTE: 0.3 K/UL (ref 0–0.6)
EOSINOPHILS RELATIVE PERCENT: 6.4 %
GFR AFRICAN AMERICAN: >60
GFR NON-AFRICAN AMERICAN: >60
GLOBULIN: 1.9 G/DL
GLUCOSE BLD-MCNC: 91 MG/DL (ref 70–99)
HCT VFR BLD CALC: 36.3 % (ref 40.5–52.5)
HDLC SERPL-MCNC: 48 MG/DL (ref 40–60)
HEMOGLOBIN: 12.5 G/DL (ref 13.5–17.5)
LDL CHOLESTEROL CALCULATED: 108 MG/DL
LYMPHOCYTES ABSOLUTE: 0.7 K/UL (ref 1–5.1)
LYMPHOCYTES RELATIVE PERCENT: 15.5 %
MCH RBC QN AUTO: 34 PG (ref 26–34)
MCHC RBC AUTO-ENTMCNC: 34.4 G/DL (ref 31–36)
MCV RBC AUTO: 99.1 FL (ref 80–100)
MONOCYTES ABSOLUTE: 0.3 K/UL (ref 0–1.3)
MONOCYTES RELATIVE PERCENT: 6.8 %
NEUTROPHILS ABSOLUTE: 3.2 K/UL (ref 1.7–7.7)
NEUTROPHILS RELATIVE PERCENT: 70.3 %
PDW BLD-RTO: 12.8 % (ref 12.4–15.4)
PLATELET # BLD: 165 K/UL (ref 135–450)
PMV BLD AUTO: 8.5 FL (ref 5–10.5)
POTASSIUM SERPL-SCNC: 4.1 MMOL/L (ref 3.5–5.1)
PROSTATE SPECIFIC ANTIGEN: 1.12 NG/ML (ref 0–4)
RBC # BLD: 3.67 M/UL (ref 4.2–5.9)
SODIUM BLD-SCNC: 135 MMOL/L (ref 136–145)
TOTAL PROTEIN: 6.3 G/DL (ref 6.4–8.2)
TRIGL SERPL-MCNC: 141 MG/DL (ref 0–150)
VLDLC SERPL CALC-MCNC: 28 MG/DL
WBC # BLD: 4.6 K/UL (ref 4–11)

## 2021-10-20 PROCEDURE — 93000 ELECTROCARDIOGRAM COMPLETE: CPT | Performed by: NURSE PRACTITIONER

## 2021-10-20 PROCEDURE — 99214 OFFICE O/P EST MOD 30 MIN: CPT | Performed by: NURSE PRACTITIONER

## 2021-10-20 PROCEDURE — 36415 COLL VENOUS BLD VENIPUNCTURE: CPT | Performed by: NURSE PRACTITIONER

## 2021-10-20 PROCEDURE — 4040F PNEUMOC VAC/ADMIN/RCVD: CPT | Performed by: NURSE PRACTITIONER

## 2021-10-20 PROCEDURE — 3017F COLORECTAL CA SCREEN DOC REV: CPT | Performed by: NURSE PRACTITIONER

## 2021-10-20 PROCEDURE — G8417 CALC BMI ABV UP PARAM F/U: HCPCS | Performed by: NURSE PRACTITIONER

## 2021-10-20 PROCEDURE — G8427 DOCREV CUR MEDS BY ELIG CLIN: HCPCS | Performed by: NURSE PRACTITIONER

## 2021-10-20 PROCEDURE — 1036F TOBACCO NON-USER: CPT | Performed by: NURSE PRACTITIONER

## 2021-10-20 PROCEDURE — 1123F ACP DISCUSS/DSCN MKR DOCD: CPT | Performed by: NURSE PRACTITIONER

## 2021-10-20 PROCEDURE — G8484 FLU IMMUNIZE NO ADMIN: HCPCS | Performed by: NURSE PRACTITIONER

## 2021-10-20 SDOH — ECONOMIC STABILITY: FOOD INSECURITY: WITHIN THE PAST 12 MONTHS, THE FOOD YOU BOUGHT JUST DIDN'T LAST AND YOU DIDN'T HAVE MONEY TO GET MORE.: NEVER TRUE

## 2021-10-20 SDOH — ECONOMIC STABILITY: TRANSPORTATION INSECURITY
IN THE PAST 12 MONTHS, HAS LACK OF TRANSPORTATION KEPT YOU FROM MEETINGS, WORK, OR FROM GETTING THINGS NEEDED FOR DAILY LIVING?: NO

## 2021-10-20 SDOH — ECONOMIC STABILITY: FOOD INSECURITY: WITHIN THE PAST 12 MONTHS, YOU WORRIED THAT YOUR FOOD WOULD RUN OUT BEFORE YOU GOT MONEY TO BUY MORE.: NEVER TRUE

## 2021-10-20 SDOH — ECONOMIC STABILITY: TRANSPORTATION INSECURITY
IN THE PAST 12 MONTHS, HAS THE LACK OF TRANSPORTATION KEPT YOU FROM MEDICAL APPOINTMENTS OR FROM GETTING MEDICATIONS?: NO

## 2021-10-20 ASSESSMENT — ENCOUNTER SYMPTOMS
SHORTNESS OF BREATH: 0
CHEST TIGHTNESS: 0
NAUSEA: 0
SINUS PRESSURE: 0
ABDOMINAL DISTENTION: 0
CONSTIPATION: 0
BACK PAIN: 0
SINUS PAIN: 0
EYE DISCHARGE: 0
COLOR CHANGE: 0
ABDOMINAL PAIN: 0
DIARRHEA: 0
COUGH: 0

## 2021-10-20 ASSESSMENT — SOCIAL DETERMINANTS OF HEALTH (SDOH): HOW HARD IS IT FOR YOU TO PAY FOR THE VERY BASICS LIKE FOOD, HOUSING, MEDICAL CARE, AND HEATING?: NOT HARD AT ALL

## 2021-10-20 NOTE — PROGRESS NOTES
Preoperative Consultation      Faviola Agosto  YOB: 1950    Date of Service:  10/20/2021    Vitals:    10/20/21 0815   BP: 130/74   Site: Right Upper Arm   Position: Sitting   Cuff Size: Medium Adult   Pulse: 74   Temp: 97.7 °F (36.5 °C)   TempSrc: Oral   SpO2: 100%   Weight: 278 lb (126.1 kg)   Height: 6' 4\" (1.93 m)      Wt Readings from Last 2 Encounters:   10/20/21 278 lb (126.1 kg)   08/30/21 278 lb (126.1 kg)     BP Readings from Last 3 Encounters:   10/20/21 130/74   08/30/21 110/82   04/23/21 126/74        Chief Complaint   Patient presents with    Pre-op Exam     PT HAVING SURGERY ON 10/22/21 WITH DR Freida Botello AT [facility] AT [facility] LEFT ELBOW SURGERY      No Known Allergies  Outpatient Medications Marked as Taking for the 10/20/21 encounter (Office Visit) with SARA Valadez CNP   Medication Sig Dispense Refill    omeprazole (PRILOSEC) 20 MG delayed release capsule TAKE 1 CAPSULE BY MOUTH EVERY DAY 90 capsule 0    atorvastatin (LIPITOR) 80 MG tablet TAKE 1 TABLET BY MOUTH EVERY DAY 90 tablet 0    metoprolol succinate (TOPROL XL) 100 MG extended release tablet TAKE 1.5 TABLETS BY MOUTH EVERY  tablet 0    DULoxetine (CYMBALTA) 60 MG extended release capsule TAKE 1 CAPSULE BY MOUTH EVERY DAY 90 capsule 0    tamsulosin (FLOMAX) 0.4 MG capsule TAKE 1 CAPSULE BY MOUTH EVERY DAY 90 capsule 0    tiZANidine (ZANAFLEX) 4 MG tablet TAKE 2 TABLETS BY MOUTH EVERY EVENING 180 tablet 0    traZODone (DESYREL) 100 MG tablet TAKE 2 TO 3 TABLETS BY MOUTH NIGHTLY. 270 tablet 2    hydroCHLOROthiazide (HYDRODIURIL) 25 MG tablet TAKE 1 TABLET BY MOUTH EVERY DAY 90 tablet 2    telmisartan (MICARDIS) 80 MG tablet TAKE 1 TABLET BY MOUTH EVERY DAY 90 tablet 1    gabapentin (NEURONTIN) 800 MG tablet Take 1 tablet by mouth 3 times daily for 180 days.  270 tablet 1    furosemide (LASIX) 40 MG tablet TAKE 1 TABLET BY MOUTH EVERY DAY 90 tablet 0    potassium chloride (KLOR-CON 10) 10 MEQ extended release tablet TAKE 1 TABLET BY MOUTH EVERY DAY 90 tablet 2    finasteride (PROSCAR) 5 MG tablet TAKE 1 TABLET BY MOUTH EVERY DAY 90 tablet 3    oxybutynin (DITROPAN-XL) 10 MG extended release tablet TAKE 1 TABLET BY MOUTH EVERY DAY 90 tablet 3    Pseudoephedrine-Guaifenesin (MUCINEX D PO) Take 1 tablet by mouth daily.  vitamin D-3 (CHOLECALCIFEROL) 5000 UNITS TABS Take 5,000 Units by mouth daily. This patient presents to the office today for a preoperative consultation at the request of surgeon, Dr. Turpin, who plans on performing left elbow surgery on October 22 at [facility]. The current problem began 10 weeks ago, and symptoms have been worsening with time. Conservative therapy: drained it, incision.     Planned anesthesia: General   Known anesthesia problems: None   Bleeding risk: No recent or remote history of abnormal bleeding  Personal or FH of DVT/PE: No    Patient objection to receiving blood products: No    Patient Active Problem List   Diagnosis    Hypercholesteremia    Hypertension    Insomnia    DDD (degenerative disc disease), lumbar    Arthritis, hip    BPH (benign prostatic hyperplasia)    Peyronie disease    Depression    Arthropathy of lumbar facet joint    Degeneration of lumbar or lumbosacral intervertebral disc    OAB (overactive bladder)    Liver cyst    MATIAS (obstructive sleep apnea)    Unspecified inflammatory spondylopathy, lumbar region ([address]    Recurrent major depressive disorder, in remission ([address]    Bilateral carotid artery stenosis       Past Medical History:   Diagnosis Date    Arthritis, hip 6/29/2011    Arthropathy of lumbar facet joint 7/23/2013    BPH (benign prostatic hyperplasia) 6/29/2011    Current smoker     DDD (degenerative disc disease), lumbar 6/29/2011    Degeneration of lumbar or lumbosacral intervertebral disc 9/24/2013    Depression 11/11/2011    Hypercholesteremia 6/29/2011    Hyperlipidemia     Hypertension     Insomnia     Liver disease     Obstructive sleep apnea on CPAP     Painful respiration     Peyronie disease 2011     Past Surgical History:   Procedure Laterality Date    COLONOSCOPY  2008    normal,    COLONOSCOPY  2018    [facility]    FINGER SURGERY Right 2020    Dupuytren's contraction deformity of rt 5th finger corrected    JOINT REPLACEMENT Left     [facility]    TONSILLECTOMY AND ADENOIDECTOMY       Family History   Problem Relation Age of Onset    Heart Failure Mother     Diabetes type 2  Mother     Lung Cancer Father         smoker    Dementia Sister     Heart Surgery Brother     Coronary Art Dis Brother     High Cholesterol Brother     Hypertension Brother     Kidney Cancer Son 39        liver cancer     Social History     Socioeconomic History    Marital status:      Spouse name: Not on file    Number of children: Not on file    Years of education: Not on file    Highest education level: Not on file   Occupational History    Occupation: Retired - owner business   Tobacco Use    Smoking status: Former Smoker     Packs/day: 2.00     Years: 8.00     Pack years: 16.00     Quit date:      Years since quittin.8    Smokeless tobacco: Never Used    Tobacco comment: quit smoking 40 years ago   Vaping Use    Vaping Use: Never used   Substance and Sexual Activity    Alcohol use: Yes     Alcohol/week: 28.0 standard drinks     Types: 28 Shots of liquor per week    Drug use: No    Sexual activity: Not on file   Other Topics Concern    Not on file   Social History Narrative    Cares for wife who has breast and pancreatic cancer. Recent death of his son from liver and kidney cancer.   No exercise program. 19     Social Determinants of Health     Financial Resource Strain: Low Risk     Difficulty of Paying Living Expenses: Not hard at all   Food Insecurity: No Food Insecurity    Worried About [facility]] in the Last Year: Never true    Ran Out of Food in the Last Year: Never true   Transportation Needs: No Transportation Needs    Lack of Transportation (Medical): No    Lack of Transportation (Non-Medical): No   Physical Activity:     Days of Exercise per Week:     Minutes of Exercise per Session:    Stress:     Feeling of Stress :    Social Connections:     Frequency of Communication with Friends and Family:     Frequency of Social Gatherings with Friends and Family:     Attends Quaker Services:     Active Member of Clubs or Organizations:     Attends Club or Organization Meetings:     Marital Status:    Intimate Partner Violence:     Fear of Current or Ex-Partner:     Emotionally Abused:     Physically Abused:     Sexually Abused:        Review of Systems   Constitutional: Negative for activity change, appetite change, fatigue, fever and unexpected weight change. HENT: Negative for congestion, ear pain, sinus pressure and sinus pain. Eyes: Negative for discharge and visual disturbance. Respiratory: Negative for cough, chest tightness and shortness of breath. Cardiovascular: Negative for chest pain, palpitations and leg swelling. Gastrointestinal: Negative for abdominal distention, abdominal pain, constipation, diarrhea and nausea. Endocrine: Negative for cold intolerance, heat intolerance, polydipsia, polyphagia and polyuria. Genitourinary: Negative for decreased urine volume, difficulty urinating, dysuria, flank pain, frequency and urgency. Musculoskeletal: Negative for arthralgias, back pain, gait problem, joint swelling, myalgias and neck pain. Skin: Negative for color change, rash and wound. Allergic/Immunologic: Negative for food allergies and immunocompromised state. Neurological: Negative for dizziness, tremors, speech difficulty, weakness, light-headedness, numbness and headaches. Hematological: Negative for adenopathy. Does not bruise/bleed easily. Psychiatric/Behavioral: Negative for confusion, decreased concentration, self-injury, sleep disturbance and suicidal ideas. The patient is not nervous/anxious. Physical Exam     EKG Interpretation:  normal EKG, normal sinus rhythm, unchanged from previous tracings. Lab Review   No visits with results within 6 Month(s) from this visit. Latest known visit with results is:   Orders Only on 10/27/2020   Component Date Value    Vit D, 25-Hydroxy 10/27/2020 69.8     Vitamin B-12 10/27/2020 >2000*    Folate 10/27/2020 >20.00     PSA 10/27/2020 4.72*    Cholesterol, Total 10/27/2020 193     Triglycerides 10/27/2020 119     HDL 10/27/2020 56     LDL Calculated 10/27/2020 113*    VLDL Cholesterol Calcula* 10/27/2020 24     Sodium 10/27/2020 140     Potassium 10/27/2020 4.4     Chloride 10/27/2020 101     CO2 10/27/2020 29     Anion Gap 10/27/2020 10     Glucose 10/27/2020 100*    BUN 10/27/2020 19     CREATININE 10/27/2020 0.9     GFR Non- 10/27/2020 >60     GFR  10/27/2020 >60     Calcium 10/27/2020 9.6     Total Protein 10/27/2020 6.1*    Albumin 10/27/2020 4.4     Albumin/Globulin Ratio 10/27/2020 2.6*    Total Bilirubin 10/27/2020 0.5     Alkaline Phosphatase 10/27/2020 77     ALT 10/27/2020 21     AST 10/27/2020 15     Globulin 10/27/2020 1.7            Assessment:       66 y.o. patient with planned surgery as above. Known risk factors for perioperative complications: Hypertension, Obstructive sleep apnea  Current medications which may produce withdrawal symptoms if withheld perioperatively: none      Plan:     1. Preoperative workup as follows: ECG, hemoglobin, hematocrit, electrolytes, creatinine, glucose, liver function studies  2.  Change in medication regimen before surgery: Take blood pressure medication on morning of surgery with sip of water, and hold all other medications until after surgery, Discontinue ASA 7 days before surgery, Discontinue NSAIDs (Ibuprofen, aleve, advil, motrin, naprosyn, naproxen, etc) 7 days before surgery, Discontinue vitamins 7 days before surgery  3. Prophylaxis for cardiac events with perioperative beta-blockers: Currently taking  metoprolol  ACC/AHA indications for pre-operative beta-blocker use:    · Vascular surgery with history of postitive stress test  · Intermediate or high risk surgery with history of CAD   · Intermediate or high risk surgery with multiple clinical predictors of CAD- 2 of the following: history of compensated or prior heart failure, history of cerebrovascular disease, DM, or renal insufficiency    Routine administration of higher-dose, long-acting metoprolol in beta-blockernaïve patients on the day of surgery, and in the absence of dose titration is associated with an overall increase in mortality. Beta-blockers should be started days to weeks prior to surgery and titrated to pulse < 70.  4. Deep vein thrombosis prophylaxis: regimen to be chosen by surgical team  5. No contraindications to planned surgery      1. Pre-op exam  -     EKG 12 Lead- NSR  -     CBC Auto Differential; Future  -     Comprehensive Metabolic Panel; Future   Cleared for sx and anesthesia  2. Swelling of left elbow joint   Sx planned in 2 days   Chronic swelling s/p fall 10 weeks ago   Dressing in place  3. Prostate cancer screening  -     PSA screening; Future   Pt followed with Dr Adolfo Manning at [address]  4. Elevated PSA  -     PSA screening; Future   Treated for prostate cancer this year  5. Prostate cancer ([facility] [address]  -     PSA screening; Future   6 weeks radiation in 2021  6. Impaired fasting glucose  -     Hemoglobin A1C; Future   Elevated in past, check A1C today  7. Hypercholesteremia  -     Lipid Panel; Future   Continue on atorvastatin   Work on limiting saturated fats in diet, and eating a healthy balance of fruits, vegetables, lean proteins, and multigrains.    Physical activity 150 minutes weekly recommended    Weight loss, initial goal 10% of body weight recommended  8. Primary hypertension  -     Comprehensive Metabolic Panel; Future   BP well controlled today   Follow a low sodium diet   Continue on metoprolol and hctz and telmisartan and lasix    Care Gaps Addressed  Labs due today  Had prostate cancer- treated, repeat PSA today  AWV due in 2022  Colonoscopy Nov 2    I have reviewed patient's pertinent medical history, relevant laboratory and imaging studies, and past/future health maintenance. Discussed with the patient the importance of adhering to their current medication regimen as directed. Advised the patient that they should continue to work on eating a healthy balanced diet and staying active by exercising within their personal limits. Orders as listed above. Patient was advised to keep future appointments with their respective specialty care team(s). Patient had the opportunity to ask questions, all of which were answered to the best of my ability and with patient satisfaction. Patient understands and is agreeable with the care plan following today's visit. Patient is to schedule an appointment for any new or worsening symptoms. Go to ER for significant shortness of breath, chest pain, or uncontrolled pain or fever. I discussed with patient the risk and benefits of any medications that were prescribed today. I verified that the patient understands their medications, labs, and/or procedures. The patient is doing well with current medication regimen and does not have any barriers to adherence. The patient's self-management abilities are good.      Follow Up in 6 Months for AWV

## 2021-10-20 NOTE — PATIENT INSTRUCTIONS
Change in medication regimen before surgery: Take blood pressure medication on morning of surgery with sip of water, and hold all other medications until after surgery, Discontinue ASA 7 days before surgery, Discontinue NSAIDs (Ibuprofen, aleve, advil, motrin, naprosyn, naproxen, etc) 7 days before surgery, Discontinue vitamins 7 days before surgery

## 2021-10-21 LAB
ESTIMATED AVERAGE GLUCOSE: 111.2 MG/DL
HBA1C MFR BLD: 5.5 %

## 2021-10-28 DIAGNOSIS — M51.37 DEGENERATION OF LUMBAR OR LUMBOSACRAL INTERVERTEBRAL DISC: ICD-10-CM

## 2021-10-28 DIAGNOSIS — M51.36 DDD (DEGENERATIVE DISC DISEASE), LUMBAR: ICD-10-CM

## 2021-10-28 DIAGNOSIS — M16.10 ARTHRITIS, HIP: ICD-10-CM

## 2021-10-28 DIAGNOSIS — M47.816 ARTHROPATHY OF LUMBAR FACET JOINT: ICD-10-CM

## 2021-10-28 RX ORDER — GABAPENTIN 800 MG/1
800 TABLET ORAL 3 TIMES DAILY
Qty: 270 TABLET | Refills: 1 | Status: SHIPPED | OUTPATIENT
Start: 2021-10-28 | End: 2022-09-28

## 2021-10-29 NOTE — FLOWSHEET NOTE
[facility] time_______11/2/21 0700_____        Surgery time________0800____    Take the following medications with a sip of water:    Do not eat or drink anything after 12:00 midnight prior to your surgery. This includes water chewing gum, mints and ice chips. You may brush your teeth and gargle the morning of your surgery, but do not swallow the water     Please see your family doctor/pediatrician for a history and physical and/or concerning medications. Bring any test results/reports from your physicians office. If you are under the care of a heart doctor or specialist doctor, please be aware that you may be asked to them for clearance    You may be asked to stop blood thinners such as Coumadin, Plavix, Fragmin, Lovenox, etc., or any anti-inflammatories such as:  Aspirin, Ibuprofen, Advil, Naproxen prior to your surgery. We also ask that you stop any OTC medications such as fish oil, vitamin E, glucosamine, garlic, Multivitamins, COQ 10, etc.    We ask that you do not smoke 24 hours prior to surgery  We ask that you do not  drink any alcoholic beverages 24 hours prior to surgery     You must make arrangements for a responsible adult to take you home after your surgery. For your safety you will not be allowed to leave alone or drive yourself home. Your surgery will be cancelled if you do not have a ride home. Also for your safety, it is strongly suggested that someone stay with you the first 24 hours after your surgery. A parent or legal guardian must accompany a child scheduled for surgery and plan to stay at the hospital until the child is discharged. Please do not bring other children with you. For your comfort, please wear simple loose fitting clothing to the hospital.  Please do not bring valuables.     Do not wear any make-up or nail polish on your fingers or toes      For your safety, please do not wear any jewelry or body piercing's on the day of surgery. All jewelry must be removed. If you have dentures, they will be removed before going to operating room. For your convenience, we will provide you with a container. If you wear contact lenses or glasses, they will be removed, please bring a case for them. If you have a living will and a durable power of  for healthcare, please bring in a copy. As part of our patient safety program to minimize surgical site infections, we ask you to do the following:    · Please notify your surgeon if you develop any illness between         now and the  day of your surgery. · This includes a cough, cold, fever, sore throat, nausea,         or vomiting, and diarrhea, etc.  ·  Please notify your surgeon if you experience dizziness, shortness         of breath or blurred vision between now and the time of your surgery. Do not shave your operative site 96 hours prior to surgery. For face and neck surgery, men may use an electric razor 48 hours   prior to surgery. You may shower the night before surgery or the morning of   your surgery with an antibacterial soap. You will need to bring a photo ID and insurance card    [facility] has an onsite pharmacy, would you like to utilize our pharmacy     If you will be staying overnight and use a C-pap machine, please bring   your C-pap to hospital     Our goal is to provide you with excellent care, therefore, visitors will be limited to two(2) in the room at a time so that we may focus on providing this care for you. Please contact pre-admission testing if you have any further questions. [facility] phone number:  [phone]  Please note these are generalized instructions for all surgical cases, you may be provided with more specific instructions according to your surgery.

## 2021-11-01 ENCOUNTER — ANESTHESIA EVENT (OUTPATIENT)
Dept: ENDOSCOPY | Age: 71
End: 2021-11-01
Payer: MEDICARE

## 2021-11-02 ENCOUNTER — HOSPITAL ENCOUNTER (OUTPATIENT)
Age: 71
Setting detail: OUTPATIENT SURGERY
Discharge: HOME | End: 2021-11-02
Attending: INTERNAL MEDICINE | Admitting: INTERNAL MEDICINE
Payer: MEDICARE

## 2021-11-02 ENCOUNTER — ANESTHESIA (OUTPATIENT)
Dept: ENDOSCOPY | Age: 71
End: 2021-11-02
Payer: MEDICARE

## 2021-11-02 VITALS
RESPIRATION RATE: 16 BRPM | HEIGHT: 76 IN | OXYGEN SATURATION: 100 % | TEMPERATURE: 96.4 F | HEART RATE: 76 BPM | DIASTOLIC BLOOD PRESSURE: 67 MMHG | WEIGHT: 278 LBS | BODY MASS INDEX: 33.85 KG/M2 | SYSTOLIC BLOOD PRESSURE: 124 MMHG

## 2021-11-02 VITALS — OXYGEN SATURATION: 94 % | SYSTOLIC BLOOD PRESSURE: 111 MMHG | DIASTOLIC BLOOD PRESSURE: 76 MMHG

## 2021-11-02 PROCEDURE — 7100000010 HC PHASE II RECOVERY - FIRST 15 MIN: Performed by: INTERNAL MEDICINE

## 2021-11-02 PROCEDURE — 3700000001 HC ADD 15 MINUTES (ANESTHESIA): Performed by: INTERNAL MEDICINE

## 2021-11-02 PROCEDURE — 3700000000 HC ANESTHESIA ATTENDED CARE: Performed by: INTERNAL MEDICINE

## 2021-11-02 PROCEDURE — 2580000003 HC RX 258: Performed by: NURSE ANESTHETIST, CERTIFIED REGISTERED

## 2021-11-02 PROCEDURE — 7100000011 HC PHASE II RECOVERY - ADDTL 15 MIN: Performed by: INTERNAL MEDICINE

## 2021-11-02 PROCEDURE — 3609027000 HC COLONOSCOPY: Performed by: INTERNAL MEDICINE

## 2021-11-02 PROCEDURE — 6360000002 HC RX W HCPCS: Performed by: NURSE ANESTHETIST, CERTIFIED REGISTERED

## 2021-11-02 RX ORDER — SODIUM CHLORIDE 0.9 % (FLUSH) 0.9 %
5-40 SYRINGE (ML) INJECTION EVERY 12 HOURS SCHEDULED
Status: DISCONTINUED | OUTPATIENT
Start: 2021-11-02 | End: 2021-11-02 | Stop reason: HOSPADM

## 2021-11-02 RX ORDER — SODIUM CHLORIDE 0.9 % (FLUSH) 0.9 %
5-40 SYRINGE (ML) INJECTION PRN
Status: DISCONTINUED | OUTPATIENT
Start: 2021-11-02 | End: 2021-11-02 | Stop reason: HOSPADM

## 2021-11-02 RX ORDER — PROPOFOL 10 MG/ML
INJECTION, EMULSION INTRAVENOUS PRN
Status: DISCONTINUED | OUTPATIENT
Start: 2021-11-02 | End: 2021-11-02 | Stop reason: SDUPTHER

## 2021-11-02 RX ORDER — SODIUM CHLORIDE 9 MG/ML
25 INJECTION, SOLUTION INTRAVENOUS PRN
Status: DISCONTINUED | OUTPATIENT
Start: 2021-11-02 | End: 2021-11-02 | Stop reason: HOSPADM

## 2021-11-02 RX ORDER — SODIUM CHLORIDE 9 MG/ML
INJECTION, SOLUTION INTRAVENOUS CONTINUOUS PRN
Status: DISCONTINUED | OUTPATIENT
Start: 2021-11-02 | End: 2021-11-02 | Stop reason: SDUPTHER

## 2021-11-02 RX ORDER — ONDANSETRON 2 MG/ML
4 INJECTION INTRAMUSCULAR; INTRAVENOUS
Status: DISCONTINUED | OUTPATIENT
Start: 2021-11-02 | End: 2021-11-02 | Stop reason: HOSPADM

## 2021-11-02 RX ADMIN — PROPOFOL 50 MG: 10 INJECTION, EMULSION INTRAVENOUS at 08:00

## 2021-11-02 RX ADMIN — SODIUM CHLORIDE: 9 INJECTION, SOLUTION INTRAVENOUS at 07:41

## 2021-11-02 RX ADMIN — PROPOFOL 100 MG: 10 INJECTION, EMULSION INTRAVENOUS at 07:43

## 2021-11-02 RX ADMIN — PROPOFOL 50 MG: 10 INJECTION, EMULSION INTRAVENOUS at 07:45

## 2021-11-02 RX ADMIN — PROPOFOL 100 MG: 10 INJECTION, EMULSION INTRAVENOUS at 07:51

## 2021-11-02 RX ADMIN — PROPOFOL 50 MG: 10 INJECTION, EMULSION INTRAVENOUS at 07:53

## 2021-11-02 RX ADMIN — PROPOFOL 50 MG: 10 INJECTION, EMULSION INTRAVENOUS at 07:56

## 2021-11-02 RX ADMIN — PROPOFOL 50 MG: 10 INJECTION, EMULSION INTRAVENOUS at 07:47

## 2021-11-02 ASSESSMENT — PAIN SCALES - GENERAL
PAINLEVEL_OUTOF10: 0
PAINLEVEL_OUTOF10: 0

## 2021-11-02 ASSESSMENT — PAIN - FUNCTIONAL ASSESSMENT: PAIN_FUNCTIONAL_ASSESSMENT: 0-10

## 2021-11-02 ASSESSMENT — ENCOUNTER SYMPTOMS: SHORTNESS OF BREATH: 0

## 2021-11-02 NOTE — PROCEDURES
[address] [place] [address] [address]                               COLONOSCOPY REPORT    PATIENT NAME: Mateo Phan                        :        1950  MED REC NO:   [id number]                          ROOM:  ACCOUNT NO:   [de-identified]                           ADMIT DATE: 2021  PROVIDER:     Nonnie Kocher A. Manegold, MD    DATE OF PROCEDURE:  2021    REFERRING PROVIDER:  Modesta Duffy MD    PATIENT HISTORY:  A 40-year-old male, outpatient. INSTRUMENT USED:  Olympus [id number]-Q180AL. MEDICATIONS OF PROCEDURE:  The patient was premedicated with Diprivan  intravenously as administered by the anesthesiology service. INDICATIONS:  The patient has a history of colonic polyps. DESCRIPTION OF PROCEDURE:  The digital and anal exams were normal.  The  colonoscope was inserted to the cecum. The prep was alternately good to  fair. Where the prep was fair, lesions such as small polyps or vascular  ectasias could have been missed. No mucosal lesions were identified. There was incidental sigmoid diverticulosis. ESTIMATED BLOOD LOSS:  None. IMPRESSION:  Sigmoid diverticulosis. PLAN:  The patient should undergo a surveillance colonoscopy in five  years. MONIQUE Silveira MD    D: 2021 8:22:22       T: 2021 8:25:46     [email or web address]  Job#: [id number]     Doc#: [id number]    CC:  Nonnie Kocher A. Ceola Cobbs, MD

## 2021-11-02 NOTE — BRIEF OP NOTE
Brief Postoperative Note    Attila Agosto  YOB: 1950  [id number]      Pre-operative Diagnosis: Polyp surveillance    Previous Colonoscopy: Yes  When: 04/10/18  Greater than 3 years? Yes      Post-operative Diagnosis: Same    Procedure: Colonoscopy    The preparation was good.     Anesthesia: [facility]    Surgeons/Assistants: Margaret Singh MD    Estimated Blood Loss: None    Complications: None    Specimens: Was Not Obtained    Findings: See dictated report    Electronically signed by Margaret Singh MD on 7/10/2017 at 7:45 AM

## 2021-11-02 NOTE — H&P
[facility]   Pre-operative History and Physical    Patient: Cindy Cyr Even  : 1950  Acct#:         HISTORY OF PRESENT ILLNESS:    The patient is a 66 y.o. male  who presents with polyp surveillance  Past Medical History:        Diagnosis Date    Arthritis, hip 2011    Arthropathy of lumbar facet joint 2013    BPH (benign prostatic hyperplasia) 2011    Current smoker     DDD (degenerative disc disease), lumbar 2011    Degeneration of lumbar or lumbosacral intervertebral disc 2013    Depression 2011    Hypercholesteremia 2011    Hyperlipidemia     Hypertension     Insomnia     Liver disease     Obstructive sleep apnea on CPAP     Painful respiration     Peyronie disease 2011     Past Surgical History:        Procedure Laterality Date    COLONOSCOPY      normal,    COLONOSCOPY      [facility]    FINGER SURGERY Right 2020    Dupuytren's contraction deformity of rt 5th finger corrected    JOINT RE[facility]ACEMENT Left     [facility]    TONSILLECTOMY AND ADENOIDECTOMY       Medications prior to admission:   Prior to Admission medications    Medication Sig Start Date End Date Taking? Authorizing Provider   gabapentin (NEURONTIN) 800 MG tablet TAKE 1 TABLET BY MOUTH 3 TIMES DAILY  DAYS.  10/28/21 4/26/22 Yes Chloe Finch MD   omeprazole (PRILOSEC) 20 MG delayed release capsule TAKE 1 CAPSULE BY MOUTH EVERY DAY 21  Yes Chloe Finch MD   atorvastatin (LIPITOR) 80 MG tablet TAKE 1 TABLET BY MOUTH EVERY DAY 21  Yes Chloe Finch MD   metoprolol succinate (TOPROL XL) 100 MG extended release tablet TAKE 1.5 TABLETS BY MOUTH EVERY DAY 21  Yes Chloe Finch MD   DULoxetine (CYMBALTA) 60 MG extended release capsule TAKE 1 CAPSULE BY MOUTH EVERY DAY 21  Yes Chloe Finch MD   tamsulosin (FLOMAX) 0.4 MG capsule TAKE 1 CAPSULE BY MOUTH EVERY DAY 21  Yes Chloe Finch MD   tiZANidine (ZANAFLEX) 4 MG tablet wife who has breast and pancreatic cancer. Recent death of his son from liver and kidney cancer. No exercise program. 8/11/20     Social Determinants of Health     Financial Resource Strain: Low Risk     Difficulty of Paying Living Expenses: Not hard at all   Food Insecurity: No Food Insecurity    Worried About Running Out of Food in the Last Year: Never true    [place] of Food in the Last Year: Never true   Transportation Needs: No Transportation Needs    Lack of Transportation (Medical): No    Lack of Transportation (Non-Medical): No   Physical Activity:     Days of Exercise per Week:     Minutes of Exercise per Session:    Stress:     Feeling of Stress :    Social Connections:     Frequency of Communication with Friends and Family:     Frequency of Social Gatherings with Friends and Family:     Attends Christianity Services:     Active Member of Clubs or Organizations:     Attends Club or Organization Meetings:     Marital Status:    Intimate Partner Violence:     Fear of Current or Ex-Partner:     Emotionally Abused:     Physically Abused:     Sexually Abused:            Family History:   Family History   Problem Relation Age of Onset    Heart Failure Mother     Diabetes type 2  Mother     Lung Cancer Father         smoker    Dementia Sister     Heart Surgery Brother     Coronary Art Dis Brother     High Cholesterol Brother     Hypertension Brother     Kidney Cancer Son 41        liver cancer         PHYSICAL EXAM:      BP (!) 155/86   Pulse 66   Temp 97.1 °F (36.2 °C) (Temporal)   Resp 18   Ht 6' 4\" (1.93 m)   Wt 278 lb (126.1 kg)   SpO2 99%   BMI 33.84 kg/m²  I        Heart: Normal    Lungs: Normal    Abdomen: Normal      ASA Grade: ASA 3 - Patient with moderate systemic disease with functional limitations    IV (only hard palate visible)  ASSESSMENT AND PLAN:    1. Patient is a 66 y.o. male here for Colonoscopy  2.   Procedure options, risks and benefits reviewed with patient who expresses understanding.

## 2021-11-02 NOTE — ANESTHESIA POSTPROCEDURE EVALUATION
Department of Anesthesiology  Postprocedure Note    Patient: Dea Agosto  MRN: [id number]  YOB: 1950  Date of evaluation: 11/2/2021  Time:  8:15 AM     Procedure Summary     Date: 11/02/21 Room / Location: [address]    Anesthesia Start: 6154 Anesthesia Stop: 0806    Procedure: COLONOSCOPY DIAGNOSTIC (N/A ) Diagnosis:       History of colon polyps      (History of colon polyps)    Surgeons: Nancy Roy MD Responsible Provider: Sheila Bai MD    Anesthesia Type: MAC ASA Status: 3          Anesthesia Type: MAC    Shaneka Phase I: Shaneka Score: 10    Shaneka Phase II: Shaneka Score: 10    Last vitals: Reviewed and per EMR flowsheets. Anesthesia Post Evaluation    Patient location during evaluation: PACU  Patient participation: complete - patient participated  Level of consciousness: awake and alert  Airway patency: patent  Nausea & Vomiting: no nausea and no vomiting  Complications: no  Cardiovascular status: hemodynamically stable and blood pressure returned to baseline  Respiratory status: spontaneous ventilation, nonlabored ventilation and room air  Hydration status: stable  Comments: Mr. Agosto resting comfortably following procedure. Denies any significant pain or nausea. Appropriate for discharge home.        Sheila Bai MD

## 2021-11-02 NOTE — OP NOTE
Operative Note      Patient: Uzma Agosto  YOB: 1950  MRN: [id number]    Date of Procedure: 11/2/2021    Pre-Op Diagnosis: History of colon polyps    Post-Op Diagnosis: Same       Procedure(s):  COLONOSCOPY DIAGNOSTIC    Surgeon(s):  Adilson Marina MD    Assistant:   * No surgical staff found *    Anesthesia: Monitor Anesthesia Care    Estimated Blood Loss (mL): 7906    Complications: None    Specimens:   * No specimens in log *    Implants:  * No implants in log *      Drains: * No LDAs found *    Findings: See dictated report    Detailed Description of Procedure:   See dictated report    Electronically signed by Adilson Marina MD on 11/2/2021 at 8:08 AM

## 2021-11-02 NOTE — ANESTHESIA PRE PROCEDURE
Department of Anesthesiology  Preprocedure Note       Name:  Varsha Agosto   Age:  66 y.o.  :  1950                                          MRN:  [id number]         Date:  2021      Surgeon: Nasrin Au):  Eleanor Diaz MD    Procedure: Procedure(s):  COLONOSCOPY DIAGNOSTIC    Medications prior to admission:   Prior to Admission medications    Medication Sig Start Date End Date Taking? Authorizing Provider   gabapentin (NEURONTIN) 800 MG tablet TAKE 1 TABLET BY MOUTH 3 TIMES DAILY  DAYS. 10/28/21 4/26/22 Yes Marixa Freeman MD   omeprazole (PRILOSEC) 20 MG delayed release capsule TAKE 1 CAPSULE BY MOUTH EVERY DAY 21  Yes Marixa Freeman MD   atorvastatin (LIPITOR) 80 MG tablet TAKE 1 TABLET BY MOUTH EVERY DAY 21  Yes Marixa Freeman MD   metoprolol succinate (TOPROL XL) 100 MG extended release tablet TAKE 1.5 TABLETS BY MOUTH EVERY DAY 21  Yes Marixa Freeman MD   DULoxetine (CYMBALTA) 60 MG extended release capsule TAKE 1 CAPSULE BY MOUTH EVERY DAY 21  Yes Marixa Freeman MD   tamsulosin (FLOMAX) 0.4 MG capsule TAKE 1 CAPSULE BY MOUTH EVERY DAY 21  Yes Marixa Freeman MD   tiZANidine (ZANAFLEX) 4 MG tablet TAKE 2 TABLETS BY MOUTH EVERY EVENING 21  Yes Marixa Freeman MD   traZODone (DESYREL) 100 MG tablet TAKE 2 TO 3 TABLETS BY MOUTH NIGHTLY.  21  Yes Marixa Freeman MD   hydroCHLOROthiazide (HYDRODIURIL) 25 MG tablet TAKE 1 TABLET BY MOUTH EVERY DAY 21  Yes Marixa Freeman MD   telmisartan (MICARDIS) 80 MG tablet TAKE 1 TABLET BY MOUTH EVERY DAY 21  Yes SARA Saravia - CNP   furosemide (LASIX) 40 MG tablet TAKE 1 TABLET BY MOUTH EVERY DAY 21  Yes Marixa Freeman MD   potassium chloride (KLOR-CON 10) 10 MEQ extended release tablet TAKE 1 TABLET BY MOUTH EVERY DAY 3/4/21  Yes Marixa Freeman MD   finasteride (PROSCAR) 5 MG tablet TAKE 1 TABLET BY MOUTH EVERY DAY 20  Yes Marixa Freeman MD   oxybutynin (DITROPAN-XL) 10 MG extended release tablet TAKE 1 disease 2011       Past Surgical History:        Procedure Laterality Date    COLONOSCOPY  2008    normal,    COLONOSCOPY  2018    [facility]    FINGER SURGERY Right 2020    Dupuytren's contraction deformity of rt 5th finger corrected    [facility]     [facility]    TONSILLECTOMY AND ADENOIDECTOMY         Social History:    Social History     Tobacco Use    Smoking status: Former Smoker     Packs/day: 2.00     Years: 8.00     Pack years: 16.00     Quit date:      Years since quittin.8    Smokeless tobacco: Never Used    Tobacco comment: quit smoking 40 years ago   Substance Use Topics    Alcohol use: Yes     Alcohol/week: 28.0 standard drinks     Types: 28 Shots of liquor per week                                Counseling given: Not Answered  Comment: quit smoking 40 years ago      Vital Signs (Current):   Vitals:    10/29/21 1031 21 0722   BP:  (!) 155/86   Pulse:  66   Resp:  18   Temp:  97.1 °F (36.2 °C)   TempSrc:  Temporal   SpO2:  99%   Weight: 272 lb (123.4 kg) 278 lb (126.1 kg)   Height: 6' 4\" (1.93 m) 6' 4\" (1.93 m)                                              BP Readings from Last 3 Encounters:   21 (!) 155/86   10/20/21 130/74   21 110/82       NPO Status: Time of last liquid consumption:                         Time of last solid consumption:                         Date of last liquid consumption: 21                        Date of last solid food consumption: 10/31/21    BMI:   Wt Readings from Last 3 Encounters:   21 278 lb (126.1 kg)   10/20/21 278 lb (126.1 kg)   21 278 lb (126.1 kg)     Body mass index is 33.84 kg/m².     CBC:   Lab Results   Component Value Date    WBC 4.6 10/20/2021    RBC 3.67 10/20/2021    HGB 12.5 10/20/2021    HCT 36.3 10/20/2021    MCV 99.1 10/20/2021    RDW 12.8 10/20/2021     10/20/2021       CMP:   Lab Results   Component Value Date     10/20/2021    K 4.1 10/20/2021    CL 97 10/20/2021    CO2 27 10/20/2021    BUN 19 10/20/2021    CREATININE 1.0 10/20/2021    GFRAA >60 10/20/2021    GFRAA >60 01/08/2013    AGRATIO 2.3 10/20/2021    LABGLOM >60 10/20/2021    GLUCOSE 91 10/20/2021    PROT 6.3 10/20/2021    PROT 6.6 10/02/2012    CALCIUM 9.4 10/20/2021    BILITOT 0.5 10/20/2021    ALKPHOS 85 10/20/2021    AST 18 10/20/2021    ALT 27 10/20/2021       POC Tests: No results for input(s): POCGLU, POCNA, POCK, POCCL, POCBUN, POCHEMO, POCHCT in the last 72 hours. Coags: No results found for: PROTIME, INR, APTT    HCG (If Applicable): No results found for: PREGTESTUR, PREGSERUM, HCG, HCGQUANT     ABGs: No results found for: PHART, PO2ART, PCR7ICQ, QXN9DEB, BEART, B7WQGBIM     Type & Screen (If Applicable):  No results found for: LABABO, LABRH    Drug/Infectious Status (If Applicable):  No results found for: HIV, HEPCAB    COVID-19 Screening (If Applicable): No results found for: COVID19        Anesthesia Evaluation   no history of anesthetic complications:   Airway: Mallampati: IV  TM distance: >3 FB   Neck ROM: full  Comment: Small mouth   Dental:    (+) caps  Comment: No loose teeth    Pulmonary: breath sounds clear to auscultation  (+) sleep apnea:      (-) COPD, asthma, shortness of breath, rhonchi, wheezes and rales                           Cardiovascular:  Exercise tolerance: no interval change,   (+) hypertension:, hyperlipidemia    (-) weak pulses and peripheral edema        Rate: normal                 ROS comment: Echocardiogram:    - Left ventricle: The cavity size is normal. Wall thickness is mildly increased. Systolic function was normal. The estimated ejection fraction was in the range of 52% to 57%. Doppler parameters are consistent with abnormal left ventricular relaxation (grade 1 diastolic dysfunction). - Left atrium: The atrium is mildly dilated. - Inferior vena cava:  The vessel was normal in size; the     respirophasic diameter changes were in the normal range (>= 50%); findings are consistent with normal central venous pressure. Neuro/Psych:   (+) psychiatric history:            GI/Hepatic/Renal:   (+) bowel prep,      (-) no renal disease and no morbid obesity      ROS comment: BPH. Endo/Other:    (+) : arthritis:., .                  ROS comment: EtOH: ~4 drinks/day Abdominal:   (+) obese,     Abdomen: soft. Vascular: Other Findings:           Anesthesia Plan      MAC     ASA 3     (NPO appropriate, denies nausea)  Induction: intravenous. Anesthetic plan and risks discussed with patient. Plan discussed with CRNA. This pre-anesthesia assessment may be used as a history and physical.    DOS STAFF ADDENDUM:    Pt seen and examined, chart reviewed (including anesthesia, drug and allergy history). No interval changes to history and physical examination. Anesthetic plan, risks, benefits, alternatives, and personnel involved discussed with patient. Patient verbalized an understanding and agrees to proceed.       Yeny Thomas MD  November 2, 2021  7:35 AM

## 2021-11-18 RX ORDER — FINASTERIDE 5 MG/1
TABLET, FILM COATED ORAL
Qty: 90 TABLET | Refills: 2 | Status: SHIPPED | OUTPATIENT
Start: 2021-11-18 | End: 2022-08-24

## 2021-11-18 RX ORDER — OXYBUTYNIN CHLORIDE 10 MG/1
TABLET, EXTENDED RELEASE ORAL
Qty: 90 TABLET | Refills: 2 | Status: SHIPPED | OUTPATIENT
Start: 2021-11-18 | End: 2022-08-24

## 2021-11-23 RX ORDER — TAMSULOSIN HYDROCHLORIDE 0.4 MG/1
CAPSULE ORAL
Qty: 90 CAPSULE | Refills: 0 | Status: SHIPPED | OUTPATIENT
Start: 2021-11-23 | End: 2022-02-22

## 2021-11-23 RX ORDER — TIZANIDINE 4 MG/1
TABLET ORAL
Qty: 180 TABLET | Refills: 0 | Status: SHIPPED | OUTPATIENT
Start: 2021-11-23 | End: 2022-02-22

## 2021-12-09 ENCOUNTER — OFFICE VISIT (OUTPATIENT)
Dept: FAMILY MEDICINE CLINIC | Age: 71
End: 2021-12-09
Payer: MEDICARE

## 2021-12-09 VITALS
BODY MASS INDEX: 39.84 KG/M2 | SYSTOLIC BLOOD PRESSURE: 120 MMHG | DIASTOLIC BLOOD PRESSURE: 82 MMHG | HEIGHT: 69 IN | WEIGHT: 269 LBS

## 2021-12-09 DIAGNOSIS — E78.00 HYPERCHOLESTEREMIA: ICD-10-CM

## 2021-12-09 DIAGNOSIS — D64.9 MILD ANEMIA: ICD-10-CM

## 2021-12-09 DIAGNOSIS — F51.01 PRIMARY INSOMNIA: ICD-10-CM

## 2021-12-09 DIAGNOSIS — E66.01 SEVERE OBESITY (BMI 35.0-35.9 WITH COMORBIDITY) (HCC): Primary | ICD-10-CM

## 2021-12-09 DIAGNOSIS — N40.1 BENIGN PROSTATIC HYPERPLASIA WITH LOWER URINARY TRACT SYMPTOMS, SYMPTOM DETAILS UNSPECIFIED: ICD-10-CM

## 2021-12-09 DIAGNOSIS — M54.50 ACUTE BILATERAL LOW BACK PAIN WITHOUT SCIATICA: ICD-10-CM

## 2021-12-09 DIAGNOSIS — I10 PRIMARY HYPERTENSION: ICD-10-CM

## 2021-12-09 PROCEDURE — 3017F COLORECTAL CA SCREEN DOC REV: CPT | Performed by: FAMILY MEDICINE

## 2021-12-09 PROCEDURE — G8484 FLU IMMUNIZE NO ADMIN: HCPCS | Performed by: FAMILY MEDICINE

## 2021-12-09 PROCEDURE — 1123F ACP DISCUSS/DSCN MKR DOCD: CPT | Performed by: FAMILY MEDICINE

## 2021-12-09 PROCEDURE — G8427 DOCREV CUR MEDS BY ELIG CLIN: HCPCS | Performed by: FAMILY MEDICINE

## 2021-12-09 PROCEDURE — 99214 OFFICE O/P EST MOD 30 MIN: CPT | Performed by: FAMILY MEDICINE

## 2021-12-09 PROCEDURE — 1036F TOBACCO NON-USER: CPT | Performed by: FAMILY MEDICINE

## 2021-12-09 PROCEDURE — G8417 CALC BMI ABV UP PARAM F/U: HCPCS | Performed by: FAMILY MEDICINE

## 2021-12-09 PROCEDURE — 4040F PNEUMOC VAC/ADMIN/RCVD: CPT | Performed by: FAMILY MEDICINE

## 2021-12-09 RX ORDER — DICLOFENAC SODIUM 75 MG/1
75 TABLET, DELAYED RELEASE ORAL 2 TIMES DAILY
Qty: 20 TABLET | Refills: 0 | Status: SHIPPED | OUTPATIENT
Start: 2021-12-09

## 2021-12-09 NOTE — PROGRESS NOTES
06/29/2011     Past Medical History:   Diagnosis Date    Arthritis, hip 6/29/2011    Arthropathy of lumbar facet joint 7/23/2013    BPH (benign prostatic hyperplasia) 6/29/2011    Current smoker     DDD (degenerative disc disease), lumbar 6/29/2011    Degeneration of lumbar or lumbosacral intervertebral disc 9/24/2013    Depression 11/11/2011    Hypercholesteremia 6/29/2011    Hyperlipidemia     Hypertension     Insomnia     Liver disease     Obstructive sleep apnea on CPAP     Painful respiration     Peyronie disease 6/29/2011     Past Surgical History:   Procedure Laterality Date    COLONOSCOPY  2008    normal,    COLONOSCOPY  2018    [facility]    COLONOSCOPY N/A 11/2/2021    COLONOSCOPY DIAGNOSTIC performed by Jazmyn López MD at [place] [id number] Right 06/05/2020    Dupuytren's contraction deformity of rt 5th finger corrected    JOINT REPLACEMENT Left 2000    [facility]    TONSILLECTOMY AND ADENOIDECTOMY       Office Visit on 10/20/2021   Component Date Value Ref Range Status    PSA 10/20/2021 1.12  0.00 - 4.00 ng/mL Final    Hemoglobin A1C 10/20/2021 5.5  See comment % Final    Comment: Comment:  Diagnosis of Diabetes: > or = 6.5%  Increased risk of diabetes (Prediabetes): 5.7-6.4%  Glycemic Control: Nonpregnant Adults: <7.0%                    Pregnant: <6.0%        eAG 10/20/2021 111.2  mg/dL Final    Cholesterol, Total 10/20/2021 184  0 - 199 mg/dL Final    Triglycerides 10/20/2021 141  0 - 150 mg/dL Final    HDL 10/20/2021 48  40 - 60 mg/dL Final    LDL Calculated 10/20/2021 108* <100 mg/dL Final    VLDL Cholesterol Calculated 10/20/2021 28  Not Established mg/dL Final    Sodium 10/20/2021 135* 136 - 145 mmol/L Final    Potassium 10/20/2021 4.1  3.5 - 5.1 mmol/L Final    Chloride 10/20/2021 97* 99 - 110 mmol/L Final    CO2 10/20/2021 27  21 - 32 mmol/L Final    Anion Gap 10/20/2021 11  3 - 16 Final    Glucose 10/20/2021 91  70 - 99 mg/dL Final    BUN 10/20/2021 19  7 - 20 mg/dL Final    CREATININE 10/20/2021 1.0  0.8 - 1.3 mg/dL Final    GFR Non- 10/20/2021 >60  >60 Final    Comment: >60 mL/min/1.73m2 EGFR, calc. for ages 25 and older using the  MDRD formula (not corrected for weight), is valid for stable  renal function.  GFR  10/20/2021 >60  >60 Final    Comment: Chronic Kidney Disease: less than 60 ml/min/1.73 sq.m. Kidney Failure: less than 15 ml/min/1.73 sq.m. Results valid for patients 18 years and older.       Calcium 10/20/2021 9.4  8.3 - 10.6 mg/dL Final    Total Protein 10/20/2021 6.3* 6.4 - 8.2 g/dL Final    Albumin 10/20/2021 4.4  3.4 - 5.0 g/dL Final    Albumin/Globulin Ratio 10/20/2021 2.3* 1.1 - 2.2 Final    Total Bilirubin 10/20/2021 0.5  0.0 - 1.0 mg/dL Final    Alkaline Phosphatase 10/20/2021 85  40 - 129 U/L Final    ALT 10/20/2021 27  10 - 40 U/L Final    AST 10/20/2021 18  15 - 37 U/L Final    Globulin 10/20/2021 1.9  Not Established g/dL Final    WBC 10/20/2021 4.6  4.0 - 11.0 K/uL Final    RBC 10/20/2021 3.67* 4.20 - 5.90 M/uL Final    Hemoglobin 10/20/2021 12.5* 13.5 - 17.5 g/dL Final    Hematocrit 10/20/2021 36.3* 40.5 - 52.5 % Final    MCV 10/20/2021 99.1  80.0 - 100.0 fL Final    MCH 10/20/2021 34.0  26.0 - 34.0 pg Final    MCHC 10/20/2021 34.4  31.0 - 36.0 g/dL Final    RDW 10/20/2021 12.8  12.4 - 15.4 % Final    Platelets 25/79/4108 165  135 - 450 K/uL Final    MPV 10/20/2021 8.5  5.0 - 10.5 fL Final    Neutrophils % 10/20/2021 70.3  % Final    Lymphocytes % 10/20/2021 15.5  % Final    Monocytes % 10/20/2021 6.8  % Final    Eosinophils % 10/20/2021 6.4  % Final    Basophils % 10/20/2021 1.0  % Final    Neutrophils Absolute 10/20/2021 3.2  1.7 - 7.7 K/uL Final    Lymphocytes Absolute 10/20/2021 0.7* 1.0 - 5.1 K/uL Final    Monocytes Absolute 10/20/2021 0.3  0.0 - 1.3 K/uL Final    Eosinophils Absolute 10/20/2021 0.3  0.0 - 0.6 K/uL Final  Basophils Absolute 10/20/2021 0.0  0.0 - 0.2 K/uL Final     Family History   Problem Relation Age of Onset    Heart Failure Mother     Diabetes type 2  Mother     Lung Cancer Father         smoker    Dementia Sister     Heart Surgery Brother     Coronary Art Dis Brother     High Cholesterol Brother     Hypertension Brother     Kidney Cancer Son 39        liver cancer     Current Outpatient Medications   Medication Sig Dispense Refill    tiZANidine (ZANAFLEX) 4 MG tablet TAKE 2 TABLETS BY MOUTH EVERY EVENING 180 tablet 0    tamsulosin (FLOMAX) 0.4 MG capsule TAKE 1 CAPSULE BY MOUTH EVERY DAY 90 capsule 0    finasteride (PROSCAR) 5 MG tablet TAKE 1 TABLET BY MOUTH EVERY DAY 90 tablet 2    oxybutynin (DITROPAN-XL) 10 MG extended release tablet TAKE 1 TABLET BY MOUTH EVERY DAY 90 tablet 2    gabapentin (NEURONTIN) 800 MG tablet TAKE 1 TABLET BY MOUTH 3 TIMES DAILY  DAYS. 270 tablet 1    omeprazole (PRILOSEC) 20 MG delayed release capsule TAKE 1 CAPSULE BY MOUTH EVERY DAY 90 capsule 0    atorvastatin (LIPITOR) 80 MG tablet TAKE 1 TABLET BY MOUTH EVERY DAY 90 tablet 0    DULoxetine (CYMBALTA) 60 MG extended release capsule TAKE 1 CAPSULE BY MOUTH EVERY DAY 90 capsule 0    traZODone (DESYREL) 100 MG tablet TAKE 2 TO 3 TABLETS BY MOUTH NIGHTLY. 270 tablet 2    hydroCHLOROthiazide (HYDRODIURIL) 25 MG tablet TAKE 1 TABLET BY MOUTH EVERY DAY 90 tablet 2    telmisartan (MICARDIS) 80 MG tablet TAKE 1 TABLET BY MOUTH EVERY DAY 90 tablet 1    furosemide (LASIX) 40 MG tablet TAKE 1 TABLET BY MOUTH EVERY DAY 90 tablet 0    potassium chloride (KLOR-CON 10) 10 MEQ extended release tablet TAKE 1 TABLET BY MOUTH EVERY DAY 90 tablet 2    Pseudoephedrine-Guaifenesin (MUCINEX D PO) Take 1 tablet by mouth daily.  vitamin D-3 (CHOLECALCIFEROL) 5000 UNITS TABS Take 5,000 Units by mouth daily.       metoprolol succinate (TOPROL XL) 100 MG extended release tablet TAKE 1 AND 1/2 TABLETS BY MOUTH EVERY DAY 135 tablet 0     No current facility-administered medications for this visit. No Known Allergies    Review of Systems    Objective:  /82 (Site: Left Upper Arm, Position: Sitting, Cuff Size: Medium Adult)   Ht 5' 9\" (1.753 m)   Wt 269 lb (122 kg)   BMI 39.72 kg/m²     BP Readings from Last 3 Encounters:   12/09/21 120/82   11/02/21 111/76   11/02/21 124/67       Pulse Readings from Last 3 Encounters:   11/02/21 76   10/20/21 74   08/30/21 68       Wt Readings from Last 3 Encounters:   12/09/21 269 lb (122 kg)   11/02/21 278 lb (126.1 kg)   10/20/21 278 lb (126.1 kg)       Physical Exam  Constitutional:       General: He is not in acute distress. Appearance: He is well-developed. Eyes:      General: No scleral icterus. Conjunctiva/sclera: Conjunctivae normal.   Cardiovascular:      Rate and Rhythm: Normal rate and regular rhythm. Heart sounds: Normal heart sounds. No murmur heard. No gallop. Pulmonary:      Effort: Pulmonary effort is normal. No respiratory distress. Breath sounds: Normal breath sounds. No wheezing, rhonchi or rales. Abdominal:      General: Bowel sounds are normal. There is no distension. Palpations: Abdomen is soft. Tenderness: There is no abdominal tenderness. Skin:     Findings: No erythema or rash. Neurological:      Mental Status: He is alert. Assessment/Plan:      Diagnosis Orders   1. Severe obesity (BMI 35.0-35.9 with comorbidity) ([address]     2. Mild anemia     3. Hypercholesteremia     4. Primary hypertension     5. Benign prostatic hyperplasia with lower urinary tract symptoms, symptom details unspecified     6. Acute bilateral low back pain without sciatica     7.  Primary insomnia       Check lab work - cbc, cmp and ferritin/ iron on f/u lab work - ? [facility] reviewed w/ pt  bp stable on present meds  F/u urology routinely  Weaning down on flomax -on finasteride  Trazodone for sleep - working well  Cont statin  utd on immunizations  Mood / grieving stable - social support/ activity d/w pt  F/u 3-4 months/ prn  Marixa Freeman MD, MD  12/9/2021  2:25 PM

## 2021-12-09 NOTE — PATIENT INSTRUCTIONS
GENERAL OFFICE POLICIES      Telephone Calls: Messages will be answered within 1-2 business days, unless the provider is out of the office. If it is urgent a covering provider will answer. (this does not include Medication refills). MyChart: We recommend all patients sign up for [employer]. Through this portal you can see your lab results, request refills, schedule appointments, pay your bill and send messages to the office. [employer] messages will be answered within 1-2 business days unless the provider is out of the office. For urgent matters, please call the office. Appointments:  All appointments must be scheduled. We ask all patients to schedule their next follow up appointment before they leave the office to make sure you will be able to be seen before you run out of medications. 24 hours notice is required to cancel or reschedule an appointment to avoid being marked as a no show. You may be dismissed from the practice after 3 no shows. LATE for Appointment: If you are 15 or more minutes late for your appointment, you may be asked to reschedule. MA/LAB APPTS: Must be scheduled, cannot accept walk in lab visits. We only draw labs for patients established in our office. We only do injections for medications ordered by our office. Acute Sick Visits:  Nothing other than acute complaint will be addressed at this visit. TRADITIONAL MEDICARE  DOES NOT COVER PHYSICALS  MEDICARE WELLNESS VISITS: These are NOT physicals but the free annual visit offered by Medicare to discuss wellness issues. Medication refills, checkups, etc. will not be addressed during this visit. Medication Refills: Refills are handled electronically so please contact your pharmacy for medication refills even if current refills have been exhausted. If you are on a controlled medication you will be referred to a specialist (pain specialist, psychiatry, etc). Forms:  There is a $35 fee to fill out FMLA/Disability paperwork, payable at time of . Instead of the fee, you can choose to have the paperwork filled out during a separate office visit that is for filling out the paperwork only. Medication Samples: This office does not carry medication samples. If you need assistance in getting your medications, then please let the medical assistant know so they can help you sign up for a drug assistance program that can help get medications at a reduced cost or even free (if you qualify). Workman's Comp Claims: We do not handle workman's comp cases or claims. You will need to go to an urgent care to be seen or to whomever your employer uses.   General  Any abusive/rude behavior toward staff/providers may be cause for dismissal.

## 2021-12-20 RX ORDER — OMEPRAZOLE 20 MG/1
CAPSULE, DELAYED RELEASE ORAL
Qty: 90 CAPSULE | Refills: 2 | Status: SHIPPED | OUTPATIENT
Start: 2021-12-20 | End: 2022-09-26

## 2021-12-20 RX ORDER — ATORVASTATIN CALCIUM 80 MG/1
TABLET, FILM COATED ORAL
Qty: 90 TABLET | Refills: 2 | Status: SHIPPED | OUTPATIENT
Start: 2021-12-20 | End: 2022-09-22

## 2021-12-22 ENCOUNTER — TELEPHONE (OUTPATIENT)
Dept: FAMILY MEDICINE CLINIC | Age: 71
End: 2021-12-22

## 2021-12-22 DIAGNOSIS — Z20.822 SUSPECTED COVID-19 VIRUS INFECTION: Primary | ICD-10-CM

## 2021-12-22 NOTE — TELEPHONE ENCOUNTER
PT WAS EXPOSED TO COVID - AND WOULD LIKE TO GET A TEST DONE - PLEASE PLACE ORDER     PT @  [phone]

## 2021-12-23 DIAGNOSIS — Z20.822 SUSPECTED COVID-19 VIRUS INFECTION: ICD-10-CM

## 2021-12-24 LAB — SARS-COV-2: NOT DETECTED

## 2021-12-28 RX ORDER — TELMISARTAN 80 MG/1
TABLET ORAL
Qty: 90 TABLET | Refills: 1 | Status: SHIPPED | OUTPATIENT
Start: 2021-12-28 | End: 2022-07-05

## 2021-12-28 NOTE — TELEPHONE ENCOUNTER
LV 12/09/21 WITH DR MAIN NV NONE    Component Ref Range & Units 10/20/21 0923 10/27/20 0945 6/1/20 1031 2/26/20 0829 10/15/19 1009 4/5/19 1006 10/18/18 0759   Sodium 136 - 145 mmol/L 135 Low   140  137  142  140  141  142    Potassium 3.5 - 5.1 mmol/L 4.1  4.4  4.4  3.9  4.4  4.1  4.1    Chloride 99 - 110 mmol/L 97 Low   101  97 Low   102  100  98 Low   101    CO2 21 - 32 mmol/L 27  29  25  26  26  31  28    Anion Gap 3 - 16 11  10  15  14  14  12  13    Glucose 70 - 99 mg/dL 91  100 High   128 High   110 High   111 High   105 High   108 High     BUN 7 - 20 mg/dL 19  19  22 High   24 High   17  26 High   21 High     CREATININE 0.8 - 1.3 mg/dL 1.0  0.9  1.1  1.1  1.0  1.1  0.9    GFR Non-African American >60 >60  >60 CM  >60 CM  >60 CM  >60 CM  >60 CM  >60 CM    Comment: >60 mL/min/1.73m2 EGFR, calc. for ages 25 and older using the   MDRD formula (not corrected for weight), is valid for stable   renal function. GFR  >60 >60  >60 CM  >60 CM  >60 CM  >60 CM  >60 CM  >60 CM    Comment: Chronic Kidney Disease: less than 60 ml/min/1.73 sq. m.         Kidney Failure: less than 15 ml/min/1.73 sq.m. Results valid for patients 18 years and older.     Calcium 8.3 - 10.6 mg/dL 9.4  9.6  9.5  9.5  9.6  9.7  9.7    Total Protein 6.4 - 8.2 g/dL 6.3 Low   6.1 Low   6.4  6.2 Low   6.2 Low   6.8  6.1 Low     Albumin 3.4 - 5.0 g/dL 4.4  4.4  4.4  4.4  4.4  4.5  4.3    Albumin/Globulin Ratio 1.1 - 2.2 2.3 High   2.6 High   2.2  2.4 High   2.4 High   2.0  2.4 High     Total Bilirubin 0.0 - 1.0 mg/dL 0.5  0.5  0.8  0.4  0.7  0.5  0.4    Alkaline Phosphatase 40 - 129 U/L 85  77  88  84  79  101  77    ALT 10 - 40 U/L 27  21  29  22  27  23  20    AST 15 - 37 U/L 18  15  33  17  17  18  15    Globulin Not Established g/dL 1.9  1.7 R  2.0 R  1.8 R  1.8 R  2.3 R  1.8 R    Resulting Agency  [address] [facility],  [address]         Component Ref Range & Units 10/20/21 4335

## 2022-01-03 RX ORDER — FUROSEMIDE 40 MG/1
TABLET ORAL
Qty: 90 TABLET | Refills: 0 | Status: SHIPPED | OUTPATIENT
Start: 2022-01-03 | End: 2022-03-29

## 2022-01-03 NOTE — TELEPHONE ENCOUNTER
General Surgery Week 3 Survey      Responses   [facility] patient discharged from?  [place]   Does the patient have one of the following disease processes/diagnoses(primary or secondary)?  General Surgery   Week 3 attempt successful?  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  Yes   Is the patient having any side effects they believe may be caused by any medication additions or changes?  No   Does the patient have all medications related to this admission filled (includes all antibiotics, pain medications, etc.)  Yes   Is the patient taking all medications as directed (includes completed medication regime)?  Yes   Does the patient have a follow up appointment scheduled with their surgeon?  Yes   Has the patient kept scheduled appointments due by today?  Yes   Has home health visited the patient within 72 hours of discharge?  N/A   Psychosocial issues?  No   Did the patient receive a copy of their discharge instructions?  Yes   Nursing interventions  Reviewed instructions with patient   What is the patient's perception of their health status since discharge?  Improving   Nursing interventions  Nurse provided patient education   Is the patient/caregiver able to teach back signs and symptoms of incisional infection?  Increased redness, swelling or pain at the incisonal site, Increased drainage or bleeding, Incisional warmth, Pus or odor from incision, Fever   If the patient is a current smoker, are they able to teach back resources for cessation?  [id number]   Is the patient/caregiver able to teach back the hierarchy of who to call/visit for symptoms/problems? PCP, Specialist, Home health nurse, Urgent Care, ED, 911  Yes   Additional teach back comments  Left Voice Mail for Wound care to call back. He admits he is not doing a good job with his wound care. Says his sister and he have had issues and she is  LV 12/09/21 WITH DR MAIN [place] NONE   Return in about 6 months (around 6/9/2022).       Component Ref Range & Units 10/20/21 0923 6/1/20 1031 2/26/20 0829 10/15/19 1009 8/3/15 1043 11/18/13 1717   WBC 4.0 - 11.0 K/uL 4.6  8.0  6.3  6.4  5.8  8.8    RBC 4.20 - 5.90 M/uL 3.67 Low   4.05 Low   4.07 Low   4.10 Low   4.53  4.15 Low     Hemoglobin 13.5 - 17.5 g/dL 12.5 Low   13.8  13.6  13.3 Low   14.4  13.7    Hematocrit 40.5 - 52.5 % 36.3 Low   39.4 Low   39.5 Low   39.5 Low   43.4  39.1 Low     MCV 80.0 - 100.0 fL 99.1  97.2  97.1  96.3  95.8  94.3    MCH 26.0 - 34.0 pg 34.0  34.0  33.5  32.6  31.8  33.1    MCHC 31.0 - 36.0 g/dL 34.4  35.0  34.5  33.8  33.2  35.1    RDW 12.4 - 15.4 % 12.8  12.9  13.1  13.7  13.6  13.5    Platelets 729 - 792 K/uL 165  209  192  176  233  330    MPV 5.0 - 10.5 fL 8.5  9.3  9.4  9.8  8.8  8.4    Neutrophils % % 70.3  84.8  70.1  75.2  65.8  72.8    Lymphocytes % % 15.5  9.2  21.0  15.9  23.8  16.5    Monocytes % % 6.8  5.3  5.9  6.3  7.7  9.0    Eosinophils % % 6.4  0.4  2.1  2.0  2.4  1.3    Basophils % % 1.0  0.3  0.9  0.6  0.3  0.4    Neutrophils Absolute 1.7 - 7.7 K/uL 3.2  6.8  4.5  4.8  3.8  6.4    Lymphocytes Absolute 1.0 - 5.1 K/uL 0.7 Low   0.7 Low   1.3  1.0  1.4  1.4    Monocytes Absolute 0.0 - 1.3 K/uL 0.3  0.4  0.4  0.4  0.4  0.8    Eosinophils Absolute 0.0 - 0.6 K/uL 0.3  0.0  0.1  0.1  0.1  0.1    Basophils Absolute 0.0 - 0.2 K/uL 0.0  0.0  0.1  0.0  0.0  0.0      Component Ref Range & Units 10/20/21 0923 10/27/20 0945 6/1/20 1031 2/26/20 0829 10/15/19 1009 4/5/19 1006 10/18/18 0759   Sodium 136 - 145 mmol/L 135 Low   140  137  142  140  141  142    Potassium 3.5 - 5.1 mmol/L 4.1  4.4  4.4  3.9  4.4  4.1  4.1    Chloride 99 - 110 mmol/L 97 Low   101  97 Low   102  100  98 Low   101    CO2 21 - 32 mmol/L 27  29  25  26  26  31  28    Anion Gap 3 - 16 11  10  15  14  14  12  13    Glucose 70 - 99 mg/dL 91  100 High   128 High   110 High   111 High   105 High   108 High     BUN 7 - 20 mg/dL 19  19  22 High   24 High   17  26 High   21 High     CREATININE 0.8 - 1.3 mg/dL 1.0  0.9  1.1  1.1  1.0  1.1  0.9    GFR Non-African American >60 >60  >60 CM  >60 CM  >60 CM  >60 CM  >60 CM  >60 CM    Comment: >60 mL/min/1.73m2 EGFR, calc. for ages 25 and older using the   MDRD formula (not corrected for weight), is valid for stable   renal function. GFR  >60 >60  >60 CM  >60 CM  >60 CM  >60 CM  >60 CM  >60 CM    Comment: Chronic Kidney Disease: less than 60 ml/min/1.73 sq. m.         Kidney Failure: less than 15 ml/min/1.73 sq.m. Results valid for patients 18 years and older.     Calcium 8.3 - 10.6 mg/dL 9.4  9.6  9.5  9.5  9.6  9.7  9.7    Total Protein 6.4 - 8.2 g/dL 6.3 Low   6.1 Low   6.4  6.2 Low   6.2 Low   6.8  6.1 Low     Albumin 3.4 - 5.0 g/dL 4.4  4.4  4.4  4.4  4.4  4.5  4.3    Albumin/Globulin Ratio 1.1 - 2.2 2.3 High   2.6 High   2.2  2.4 High   2.4 High   2.0  2.4 High     Total Bilirubin 0.0 - 1.0 mg/dL 0.5  0.5  0.8  0.4  0.7  0.5  0.4    Alkaline Phosphatase 40 - 129 U/L 85  77  88  84  79  101  77    ALT 10 - 40 U/L 27  21  29  22  27  23  20    AST 15 - 37 U/L 18  15  33  17  17  18  15    Globulin Not Established g/dL 1.9  1.7 R  2.0 R  1.8 R  1.8 R  2.3 R  1.8 R not doing it.   Week 3 call completed?  Yes   Wrap up additional comments  He needs wound care or a better way to do it at home.          Liana Olea RN

## 2022-01-04 RX ORDER — DULOXETIN HYDROCHLORIDE 60 MG/1
CAPSULE, DELAYED RELEASE ORAL
Qty: 90 CAPSULE | Refills: 0 | Status: SHIPPED | OUTPATIENT
Start: 2022-01-04 | End: 2022-03-29

## 2022-01-27 ENCOUNTER — VIRTUAL VISIT (OUTPATIENT)
Dept: FAMILY MEDICINE CLINIC | Age: 72
End: 2022-01-27
Payer: MEDICARE

## 2022-01-27 DIAGNOSIS — J40 BRONCHITIS: Primary | ICD-10-CM

## 2022-01-27 DIAGNOSIS — Z20.822 SUSPECTED COVID-19 VIRUS INFECTION: ICD-10-CM

## 2022-01-27 PROCEDURE — 1123F ACP DISCUSS/DSCN MKR DOCD: CPT | Performed by: FAMILY MEDICINE

## 2022-01-27 PROCEDURE — 1036F TOBACCO NON-USER: CPT | Performed by: FAMILY MEDICINE

## 2022-01-27 PROCEDURE — 99213 OFFICE O/P EST LOW 20 MIN: CPT | Performed by: FAMILY MEDICINE

## 2022-01-27 PROCEDURE — 4040F PNEUMOC VAC/ADMIN/RCVD: CPT | Performed by: FAMILY MEDICINE

## 2022-01-27 PROCEDURE — G8484 FLU IMMUNIZE NO ADMIN: HCPCS | Performed by: FAMILY MEDICINE

## 2022-01-27 PROCEDURE — 3017F COLORECTAL CA SCREEN DOC REV: CPT | Performed by: FAMILY MEDICINE

## 2022-01-27 PROCEDURE — G8417 CALC BMI ABV UP PARAM F/U: HCPCS | Performed by: FAMILY MEDICINE

## 2022-01-27 PROCEDURE — G8428 CUR MEDS NOT DOCUMENT: HCPCS | Performed by: FAMILY MEDICINE

## 2022-01-27 RX ORDER — AMOXICILLIN 875 MG/1
875 TABLET, COATED ORAL 2 TIMES DAILY
Qty: 14 TABLET | Refills: 0 | Status: SHIPPED | OUTPATIENT
Start: 2022-01-27 | End: 2022-02-03

## 2022-01-27 RX ORDER — METHYLPREDNISOLONE 4 MG/1
TABLET ORAL
Qty: 1 KIT | Refills: 0 | Status: SHIPPED | OUTPATIENT
Start: 2022-01-27 | End: 2022-02-02

## 2022-01-27 NOTE — PROGRESS NOTES
2022    TELEHEALTH EVALUATION -- Audio/Visual (During XPCAE-98 public health emergency)    HPI:    Arnold Agosto (:  1950) has requested an audio/video evaluation for the following concern(s):    Coughing, sneezing , chills, sore throat  Cough for 3 weeks - cold sxs  Felt like getting better over weekend but worse again  For a few days. advil seems to help - mild sore throat  Dry cough. and nasal d/c / congtestion  mucinex d helps  Not fluish. Had neg covid test - in home test.  Coughing a lot - going to [employer] a lot    Had covid booster    Review of Systems    Prior to Visit Medications    Medication Sig Taking? Authorizing Provider   DULoxetine (CYMBALTA) 60 MG extended release capsule TAKE 1 CAPSULE BY MOUTH EVERY DAY  Britney Calzada MD   furosemide (LASIX) 40 MG tablet TAKE 1 TABLET BY MOUTH EVERY DAY  Britney Calzada MD   telmisartan (Ethelyn Patter) 80 MG tablet TAKE 1 TABLET BY MOUTH EVERY DAY  Britney Calzada MD   omeprazole ([address]) 20 MG delayed release capsule TAKE 1 CAPSULE BY MOUTH EVERY DAY  Britney Calzada MD   atorvastatin (LIPITOR) 80 MG tablet TAKE 1 TABLET BY MOUTH EVERY DAY  Britney Calzada MD   metoprolol succinate (TOPROL XL) 100 MG extended release tablet TAKE 1 AND 1/2 TABLETS BY MOUTH EVERY DAY  Britney Calzada MD   diclofenac (VOLTAREN) 75 MG EC tablet Take 1 tablet by mouth 2 times daily With food  Britney Calzada MD   tiZANidine (ZANAFLEX) 4 MG tablet TAKE 2 TABLETS BY MOUTH EVERY EVENING  Britney Calzada MD   tamsulosin (FLOMAX) 0.4 MG capsule TAKE 1 CAPSULE BY MOUTH EVERY DAY  Britney Calazda MD   finasteride (PROSCAR) 5 MG tablet TAKE 1 TABLET BY MOUTH EVERY DAY  Britney Calzada MD   oxybutynin (DITROPAN-XL) 10 MG extended release tablet TAKE 1 TABLET BY MOUTH EVERY DAY  Britney Calzada MD   gabapentin (NEURONTIN) 800 MG tablet TAKE 1 TABLET BY MOUTH 3 TIMES DAILY  DAYS. Britney Calzada MD   traZODone (DESYREL) 100 MG tablet TAKE 2 TO 3 TABLETS BY MOUTH NIGHTLY.   Britney Calzada MD hydroCHLOROthiazide (HYDRODIURIL) 25 MG tablet TAKE 1 TABLET BY MOUTH EVERY DAY  Britney Calzada MD   potassium chloride (KLOR-CON 10) 10 MEQ extended release tablet TAKE 1 TABLET BY MOUTH EVERY DAY  Britney Calzada MD   Pseudoephedrine-Guaifenesin [facility] PO) Take 1 tablet by mouth daily. Historical Provider, MD   vitamin D-3 (CHOLECALCIFEROL) 5000 UNITS TABS Take 5,000 Units by mouth daily. Historical Provider, MD       Social History     Tobacco Use    Smoking status: Former Smoker     Packs/day: 2.00     Years: 8.00     Pack years: 16.00     Quit date:      Years since quittin.1    Smokeless tobacco: Never Used    Tobacco comment: quit smoking 40 years ago   Vaping Use    Vaping Use: Never used   Substance Use Topics    Alcohol use: Yes     Alcohol/week: 28.0 standard drinks     Types: 28 Shots of liquor per week    Drug use: No        PHYSICAL EXAMINATION:  [ INSTRUCTIONS:  \"[x]\" Indicates a positive item  \"[]\" Indicates a negative item  -- DELETE ALL ITEMS NOT EXAMINED]  Vital Signs: (As obtained by patient/caregiver or practitioner observation)    Blood pressure-  Heart rate-    Respiratory rate-    Temperature-  Pulse oximetry-     Constitutional: [x] Appears well-developed and well-nourished [] No apparent distress      [] Abnormal-   Mental status  [x] Alert and awake  [] Oriented to person/place/time []Able to follow commands      Eyes:  EOM    []  Normal  [] Abnormal-  Sclera  []  Normal  [] Abnormal -         Discharge []  None visible  [] Abnormal -    HENT:   [x] Normocephalic, atraumatic.   [] Abnormal   [] Mouth/Throat: Mucous membranes are moist.     External Ears [] Normal  [] Abnormal-     Neck: [] No visualized mass     Pulmonary/Chest: [x] Respiratory effort normal.  [] No visualized signs of difficulty breathing or respiratory distress        [] Abnormal-      Musculoskeletal:   [] Normal gait with no signs of ataxia         [] Normal range of motion of neck        [] Abnormal-       Neurological:        [x] No Facial Asymmetry (Cranial nerve 7 motor function) (limited exam to video visit)          [] No gaze palsy        [] Abnormal-         Skin:        [x] No significant exanthematous lesions or discoloration noted on facial skin         [] Abnormal-            Psychiatric:       [x] Normal Affect [] No Hallucinations        [] Abnormal-     Other pertinent observable physical exam findings-     ASSESSMENT/PLAN:   Diagnosis Orders   1. Bronchitis     2. Suspected COVID-19 virus infection  COVID-19     W/ worsening sxs - tx empirically for bacterial infection w/ amoxil for 1 week  And medrol dose pack - se dw/ pt  Continue decongestants such as afrin nasal spray, advil, tussin dm as needed  Hydrate well  F/u if fails to improve  Will check pcr covid test today or tomorrow  Dw/ pt this could be a new infection on top of resolving older infection      Jesusita Gutierrez, was evaluated through a synchronous (real-time) audio-video encounter. The patient (or guardian if applicable) is aware that this is a billable service, which includes applicable co-pays. This Virtual Visit was conducted with patient's (and/or legal guardian's) consent. The visit was conducted pursuant to the emergency declaration under the [address] [place] [address] authority and the [place] and [place] [employer] Act. Patient identification was verified, and a caregiver was present when appropriate. The patient was located at home in a state where the provider was licensed to provide care. Total time spent on this encounter: 11-20 minutes were spent on the digital evaluation and management of this patient. --Micheal Thacker MD on 1/27/2022 at 11:35 AM    An electronic signature was used to authenticate this note.

## 2022-01-28 ENCOUNTER — PATIENT MESSAGE (OUTPATIENT)
Dept: FAMILY MEDICINE CLINIC | Age: 72
End: 2022-01-28

## 2022-01-28 LAB — SARS-COV-2: DETECTED

## 2022-01-28 NOTE — TELEPHONE ENCOUNTER
From: Ariadne Agosto  To: Dr. Soto Ice: 1/28/2022 11:30 AM EST  Subject: Test results    Please explain the Covid test result from yesterday. It is different than the one in December. Thank You.

## 2022-02-22 RX ORDER — TAMSULOSIN HYDROCHLORIDE 0.4 MG/1
CAPSULE ORAL
Qty: 90 CAPSULE | Refills: 1 | Status: SHIPPED | OUTPATIENT
Start: 2022-02-22

## 2022-02-22 RX ORDER — TIZANIDINE 4 MG/1
TABLET ORAL
Qty: 180 TABLET | Refills: 1 | Status: SHIPPED | OUTPATIENT
Start: 2022-02-22 | End: 2022-02-22 | Stop reason: SDUPTHER

## 2022-02-22 RX ORDER — TIZANIDINE 4 MG/1
TABLET ORAL
Qty: 180 TABLET | Refills: 0 | Status: SHIPPED | OUTPATIENT
Start: 2022-02-22 | End: 2022-10-27

## 2022-03-03 RX ORDER — METOPROLOL SUCCINATE 100 MG/1
TABLET, EXTENDED RELEASE ORAL
Qty: 135 TABLET | Refills: 0 | Status: SHIPPED | OUTPATIENT
Start: 2022-03-03 | End: 2022-07-25 | Stop reason: SDUPTHER

## 2022-03-03 NOTE — TELEPHONE ENCOUNTER
LAST VISIT 12/09/2021 WITH DR MAIN, NEXT VISIT NONE. [facility]       10/20/2021  2:52 PM - Nilam Piggs Incoming Lab Results From Soft (Epic Adt)    Component Value Flag Ref Range Units Status   Sodium 135   Low   136 - 145 mmol/L Final   Potassium 4.1   3.5 - 5.1 mmol/L Final   Chloride 97   Low   99 - 110 mmol/L Final   CO2 27   21 - 32 mmol/L Final   Anion Gap 11   3 - 16  Final   Glucose 91   70 - 99 mg/dL Final   BUN 19   7 - 20 mg/dL Final   CREATININE 1.0   0.8 - 1.3 mg/dL Final   GFR Non- >60   >60  Final   Comment:   >60 mL/min/1.73m2 EGFR, calc. for ages 25 and older using the   MDRD formula (not corrected for weight), is valid for stable   renal function. GFR  >60   >60  Final   Comment:   Chronic Kidney Disease: less than 60 ml/min/1.73 sq. m.         Kidney Failure: less than 15 ml/min/1.73 sq.m. Results valid for patients 18 years and older.     Calcium 9.4   8.3 - 10.6 mg/dL Final   Total Protein 6.3   Low   6.4 - 8.2 g/dL Final   Albumin 4.4   3.4 - 5.0 g/dL Final   Albumin/Globulin Ratio 2.3   High   1.1 - 2.2  Final   Total Bilirubin 0.5   0.0 - 1.0 mg/dL Final   Alkaline Phosphatase 85   40 - 129 U/L Final   ALT 27   10 - 40 U/L Final   AST 18   15 - 37 U/L Final   Globulin 1.9   Not Established g/dL Final

## 2022-03-09 RX ORDER — POTASSIUM CHLORIDE 750 MG/1
TABLET, FILM COATED, EXTENDED RELEASE ORAL
Qty: 90 TABLET | Refills: 0 | Status: SHIPPED | OUTPATIENT
Start: 2022-03-09 | End: 2022-06-06

## 2022-03-09 NOTE — TELEPHONE ENCOUNTER
LV 1/27/22 WITH DR MAIN [place] NONE    Component Ref Range & Units 10/20/21 0923 10/27/20 0945 6/1/20 1031 2/26/20 0829 10/15/19 1009 4/5/19 1006 10/18/18 0759   Sodium 136 - 145 mmol/L 135 Low   140  137  142  140  141  142    Potassium 3.5 - 5.1 mmol/L 4.1  4.4  4.4  3.9  4.4  4.1  4.1    Chloride 99 - 110 mmol/L 97 Low   101  97 Low   102  100  98 Low   101    CO2 21 - 32 mmol/L 27  29  25  26  26  31  28    Anion Gap 3 - 16 11  10  15  14  14  12  13    Glucose 70 - 99 mg/dL 91  100 High   128 High   110 High   111 High   105 High   108 High     BUN 7 - 20 mg/dL 19  19  22 High   24 High   17  26 High   21 High     CREATININE 0.8 - 1.3 mg/dL 1.0  0.9  1.1  1.1  1.0  1.1  0.9    GFR Non-African American >60 >60  >60 CM  >60 CM  >60 CM  >60 CM  >60 CM  >60 CM    Comment: >60 mL/min/1.73m2 EGFR, calc. for ages 25 and older using the   MDRD formula (not corrected for weight), is valid for stable   renal function. GFR  >60 >60  >60 CM  >60 CM  >60 CM  >60 CM  >60 CM  >60 CM    Comment: Chronic Kidney Disease: less than 60 ml/min/1.73 sq. m.         Kidney Failure: less than 15 ml/min/1.73 sq.m. Results valid for patients 18 years and older.     Calcium 8.3 - 10.6 mg/dL 9.4  9.6  9.5  9.5  9.6  9.7  9.7    Total Protein 6.4 - 8.2 g/dL 6.3 Low   6.1 Low   6.4  6.2 Low   6.2 Low   6.8  6.1 Low     Albumin 3.4 - 5.0 g/dL 4.4  4.4  4.4  4.4  4.4  4.5  4.3    Albumin/Globulin Ratio 1.1 - 2.2 2.3 High   2.6 High   2.2  2.4 High   2.4 High   2.0  2.4 High     Total Bilirubin 0.0 - 1.0 mg/dL 0.5  0.5  0.8  0.4  0.7  0.5  0.4    Alkaline Phosphatase 40 - 129 U/L 85  77  88  84  79  101  77    ALT 10 - 40 U/L 27  21  29  22  27  23  20    AST 15 - 37 U/L 18  15  33  17  17  18  15    Globulin Not Established g/dL 1.9  1.7 R  2.0 R  1.8 R  1.8 R  2.3 R  1.8 R

## 2022-03-29 RX ORDER — DULOXETIN HYDROCHLORIDE 60 MG/1
CAPSULE, DELAYED RELEASE ORAL
Qty: 90 CAPSULE | Refills: 0 | Status: SHIPPED | OUTPATIENT
Start: 2022-03-29 | End: 2022-06-27

## 2022-03-29 RX ORDER — FUROSEMIDE 40 MG/1
TABLET ORAL
Qty: 90 TABLET | Refills: 0 | Status: SHIPPED | OUTPATIENT
Start: 2022-03-29 | End: 2022-06-27

## 2022-04-04 RX ORDER — TRAZODONE HYDROCHLORIDE 100 MG/1
TABLET ORAL
Qty: 270 TABLET | Refills: 0 | Status: SHIPPED | OUTPATIENT
Start: 2022-04-04 | End: 2022-07-05

## 2022-04-18 RX ORDER — HYDROCHLOROTHIAZIDE 25 MG/1
TABLET ORAL
Qty: 90 TABLET | Refills: 0 | Status: SHIPPED | OUTPATIENT
Start: 2022-04-18 | End: 2022-07-18

## 2022-04-20 ENCOUNTER — TELEPHONE (OUTPATIENT)
Dept: FAMILY MEDICINE CLINIC | Age: 72
End: 2022-04-20

## 2022-04-20 RX ORDER — PREDNISONE 10 MG/1
TABLET ORAL
Qty: 25 TABLET | Refills: 0 | Status: SHIPPED | OUTPATIENT
Start: 2022-04-20 | End: 2022-08-19

## 2022-04-20 NOTE — TELEPHONE ENCOUNTER
CALLED PT HE IS SEEING CHIROPRACTOR XRAYS WERE DONE AT [facility]. DR. Shailesh Weller THINKS A STEROID WILL HELP HIM BUT HE CANNOT PRESCRIBE. YISEL     [facility]. [facility], [facility]

## 2022-04-20 NOTE — TELEPHONE ENCOUNTER
----- Message from Terrance Bob sent at 4/20/2022 11:40 AM EDT -----  Subject: Message to Provider    QUESTIONS  Information for Provider? patient stated that Dr. Renetta Flannery recommended a   steroid that you take 6 the first day and 5 the second day for back pain  ---------------------------------------------------------------------------  --------------  CALL BACK INFO  What is the best way for the office to contact you? OK to leave message on   voicemail  Preferred Call Back Phone Number? [phone]  ---------------------------------------------------------------------------  --------------  SCRIPT ANSWERS  Relationship to Patient?  Self

## 2022-05-13 RX ORDER — TAMSULOSIN HYDROCHLORIDE 0.4 MG/1
CAPSULE ORAL
Qty: 90 CAPSULE | Refills: 1 | OUTPATIENT
Start: 2022-05-13

## 2022-05-13 NOTE — TELEPHONE ENCOUNTER
tamsulosin (FLOMAX) 0.4 MG capsule 90 capsule 1 2/22/2022     Sig: TAKE 1 CAPSULE BY MOUTH EVERY DAY    Sent to pharmacy as: Tamsulosin HCl 0.4 MG Oral Capsule (FLOMAX)    Cosign for Ordering: Accepted by Nelda Santiago MD on 2/23/2022  9:26 AM    E-Prescribing Status: Receipt confirmed by pharmacy (2/22/2022  9:18 AM EST)        REFILL TOO SOON

## 2022-06-06 RX ORDER — POTASSIUM CHLORIDE 750 MG/1
TABLET, FILM COATED, EXTENDED RELEASE ORAL
Qty: 90 TABLET | Refills: 0 | Status: SHIPPED | OUTPATIENT
Start: 2022-06-06 | End: 2022-09-08 | Stop reason: SDUPTHER

## 2022-06-27 RX ORDER — DULOXETIN HYDROCHLORIDE 60 MG/1
CAPSULE, DELAYED RELEASE ORAL
Qty: 90 CAPSULE | Refills: 0 | Status: SHIPPED | OUTPATIENT
Start: 2022-06-27 | End: 2022-09-26

## 2022-06-27 RX ORDER — FUROSEMIDE 40 MG/1
TABLET ORAL
Qty: 90 TABLET | Refills: 0 | Status: SHIPPED | OUTPATIENT
Start: 2022-06-27

## 2022-07-05 RX ORDER — TELMISARTAN 80 MG/1
TABLET ORAL
Qty: 90 TABLET | Refills: 0 | Status: SHIPPED | OUTPATIENT
Start: 2022-07-05 | End: 2022-09-29

## 2022-07-05 RX ORDER — TRAZODONE HYDROCHLORIDE 100 MG/1
TABLET ORAL
Qty: 270 TABLET | Refills: 0 | Status: SHIPPED | OUTPATIENT
Start: 2022-07-05 | End: 2022-10-05

## 2022-07-18 RX ORDER — HYDROCHLOROTHIAZIDE 25 MG/1
TABLET ORAL
Qty: 90 TABLET | Refills: 0 | Status: SHIPPED | OUTPATIENT
Start: 2022-07-18 | End: 2022-10-17

## 2022-07-18 NOTE — TELEPHONE ENCOUNTER
Past Visits    Date Provider Specialty Visit Type Primary Dx   01/27/2022 Griselda Mares MD Family Medicine Virtual Visit Bronchitis   12/09/2021 Griselda Mares MD Family Medicine Office Visit Severe obesity (BMI 35.0-35.9 with comorbidity) [facility])   Instructions      Return in about 6 months (around 6/9/2022).     Next visit None    Called pt, scheduled f/u appt with [facility] for 8/19/22./mv

## 2022-07-25 RX ORDER — METOPROLOL SUCCINATE 100 MG/1
TABLET, EXTENDED RELEASE ORAL
Qty: 135 TABLET | Refills: 0 | Status: SHIPPED | OUTPATIENT
Start: 2022-07-25

## 2022-08-19 ENCOUNTER — OFFICE VISIT (OUTPATIENT)
Dept: FAMILY MEDICINE CLINIC | Age: 72
End: 2022-08-19
Payer: MEDICARE

## 2022-08-19 VITALS
WEIGHT: 268 LBS | HEART RATE: 71 BPM | SYSTOLIC BLOOD PRESSURE: 138 MMHG | DIASTOLIC BLOOD PRESSURE: 76 MMHG | BODY MASS INDEX: 39.69 KG/M2 | OXYGEN SATURATION: 95 % | HEIGHT: 69 IN

## 2022-08-19 DIAGNOSIS — N32.81 OAB (OVERACTIVE BLADDER): ICD-10-CM

## 2022-08-19 DIAGNOSIS — E78.00 HYPERCHOLESTEREMIA: Primary | ICD-10-CM

## 2022-08-19 DIAGNOSIS — C61 PROSTATE CANCER (HCC): ICD-10-CM

## 2022-08-19 DIAGNOSIS — M46.96 UNSPECIFIED INFLAMMATORY SPONDYLOPATHY, LUMBAR REGION (HCC): ICD-10-CM

## 2022-08-19 DIAGNOSIS — E66.01 SEVERE OBESITY (BMI 35.0-39.9) WITH COMORBIDITY (HCC): ICD-10-CM

## 2022-08-19 DIAGNOSIS — F33.40 RECURRENT MAJOR DEPRESSIVE DISORDER, IN REMISSION (HCC): ICD-10-CM

## 2022-08-19 DIAGNOSIS — I10 PRIMARY HYPERTENSION: ICD-10-CM

## 2022-08-19 DIAGNOSIS — M51.36 DDD (DEGENERATIVE DISC DISEASE), LUMBAR: ICD-10-CM

## 2022-08-19 PROCEDURE — 3017F COLORECTAL CA SCREEN DOC REV: CPT | Performed by: FAMILY MEDICINE

## 2022-08-19 PROCEDURE — G8417 CALC BMI ABV UP PARAM F/U: HCPCS | Performed by: FAMILY MEDICINE

## 2022-08-19 PROCEDURE — 1123F ACP DISCUSS/DSCN MKR DOCD: CPT | Performed by: FAMILY MEDICINE

## 2022-08-19 PROCEDURE — G8427 DOCREV CUR MEDS BY ELIG CLIN: HCPCS | Performed by: FAMILY MEDICINE

## 2022-08-19 PROCEDURE — 1036F TOBACCO NON-USER: CPT | Performed by: FAMILY MEDICINE

## 2022-08-19 PROCEDURE — 99214 OFFICE O/P EST MOD 30 MIN: CPT | Performed by: FAMILY MEDICINE

## 2022-08-19 NOTE — PROGRESS NOTES
[facility] Medicine  Clinic Note    Date: 8/19/2022                                               Subjective:     Chief Complaint   Patient presents with    Hypertension     HTN FOLLOW UP      HPI    Labs 10/21 reviewed - chronic borderline anemia w/ borderline low Na  Lipid,psa good  GETTING INJECTIONS FROM ACON - HAD FEW DAYS AGO   STARTING TO HELP SOME  CHRONIC LOW BACK / LEFT HIP PAIN  HAD THR IN PAST - MRI OKAY  STARTING PT NEXT WEEK FOR BACK  SLEEP GOOD OVERALL. SEES UROLOGY AT  EVERY 6 MONTHS   SON GETTING PROSTATE REMOVED  URINE FLOW OKAY - OFF TAMSULOSIN - ON PROSCAR/ DITROPAN  NOT CHECKING BP OFTEN AT HOME  TAKES LASIX/ KCL EVERY OTHER DAY W/ GOOD CONTROL OF SWELLING  LIMITS SALT A LITTLE -   DRINKING FLUIDS THROUGH DAY. MOOD OKAY - HAD 2ND ANNIVERSARY OF WIFE'S PASSING. Still going to work -checks on jobs - keeps him busy.     BP Readings from Last 3 Encounters:   08/19/22 138/76   12/09/21 120/82   11/02/21 111/76     Pulse Readings from Last 3 Encounters:   08/19/22 71   11/02/21 76   10/20/21 74     Wt Readings from Last 3 Encounters:   08/19/22 268 lb (121.6 kg)   12/09/21 269 lb (122 kg)   11/02/21 278 lb (126.1 kg)            Patient Active Problem List    Diagnosis Date Noted    Recurrent major depressive disorder, in remission ([address] 04/23/2021    Bilateral carotid artery stenosis 04/23/2021    Unspecified inflammatory spondylopathy, lumbar region ([facility] 11/11/2020    MATIAS (obstructive sleep apnea)     Liver cyst 12/14/2017    OAB (overactive bladder) 08/31/2016    Degeneration of lumbar or lumbosacral intervertebral disc 09/24/2013    Arthropathy of lumbar facet joint 07/23/2013    Depression 11/11/2011    Hypercholesteremia 06/29/2011    Hypertension 06/29/2011    Insomnia 06/29/2011    DDD (degenerative disc disease), lumbar 06/29/2011    Arthritis, hip 06/29/2011    BPH (benign prostatic hyperplasia) 06/29/2011    Peyronie disease 06/29/2011     Past Medical History:   Diagnosis Date Arthritis, hip 6/29/2011    Arthropathy of lumbar facet joint 7/23/2013    BPH (benign prostatic hyperplasia) 6/29/2011    Current smoker     DDD (degenerative disc disease), lumbar 6/29/2011    Degeneration of lumbar or lumbosacral intervertebral disc 9/24/2013    Depression 11/11/2011    Hypercholesteremia 6/29/2011    Hyperlipidemia     Hypertension     Insomnia     Liver disease     Obstructive sleep apnea on CPAP     Painful respiration     Peyronie disease 6/29/2011     Past Surgical History:   Procedure Laterality Date    COLONOSCOPY  2008    normal,    COLONOSCOPY  2018    [facility]    COLONOSCOPY N/A 11/2/2021    COLONOSCOPY DIAGNOSTIC performed by Nancy Roy MD at [address] Right 06/05/2020    Dupuytren's contraction deformity of rt 5th finger corrected    JOINT REPLACEMENT Left 2000    hip - beacon - [facility]    TONSILLECTOMY AND ADENOIDECTOMY       Orders Only on 01/27/2022   Component Date Value Ref Range Status    SARS-CoV-2 01/27/2022 Detected (A) Not detected Final    Comment: This test has been authorized by FDA under an  Emergency Use Authorization (EUA). This test is only authorized for the duration of the  time of declaration that circumstances exist justifying the  authorization of the emergency use of in vitro diagnostic  testing for detection of the SARS-CoV-2 virus  and/or diagnosis of COVID-19 infection under  section 564 (b)(1) of the Act, 21 U. S.C. 293OLG-7 (b) (1),  unless the authorization is terminated or revoked sooner.     Patient Fact [email or web address]  Provider Fact [email or web address]    METHODOLOGY: RT PCR       Family History   Problem Relation Age of Onset    Heart Failure Mother     Diabetes type 2  Mother     Lung Cancer Father         smoker    Dementia Sister     Heart Surgery Brother     Coronary Art Dis Brother     High Cholesterol Brother     Hypertension Brother     Kidney Cancer Son 39        liver cancer     Current Outpatient Medications   Medication Sig Dispense Refill    metoprolol succinate (TOPROL XL) 100 MG extended release tablet TAKE 1.5 TABLETS BY MOUTH EVERY  tablet 0    hydroCHLOROthiazide (HYDRODIURIL) 25 MG tablet TAKE 1 TABLET BY MOUTH EVERY DAY 90 tablet 0    telmisartan (MICARDIS) 80 MG tablet TAKE 1 TABLET BY MOUTH EVERY DAY 90 tablet 0    traZODone (DESYREL) 100 MG tablet TAKE 2 TO 3 TABLETS BY MOUTH NIGHTLY. 270 tablet 0    furosemide (LASIX) 40 MG tablet TAKE 1 TABLET BY MOUTH EVERY DAY 90 tablet 0    DULoxetine (CYMBALTA) 60 MG extended release capsule TAKE 1 CAPSULE BY MOUTH EVERY DAY 90 capsule 0    potassium chloride (KLOR-CON) 10 MEQ extended release tablet TAKE 1 TABLET BY MOUTH EVERY DAY 90 tablet 0    predniSONE (DELTASONE) 10 MG tablet Take 4 tabs daily for 3 days then 3 for 2, then 2 for 2,  then 1 for 2, then 1/2 tab for 2 in am w/ food 25 tablet 0    tamsulosin (FLOMAX) 0.4 MG capsule TAKE 1 CAPSULE BY MOUTH EVERY DAY 90 capsule 1    tiZANidine (ZANAFLEX) 4 MG tablet TAKE 2 TABLETS BY MOUTH EVERY EVENING 180 tablet 0    omeprazole (PRILOSEC) 20 MG delayed release capsule TAKE 1 CAPSULE BY MOUTH EVERY DAY 90 capsule 2    atorvastatin (LIPITOR) 80 MG tablet TAKE 1 TABLET BY MOUTH EVERY DAY 90 tablet 2    diclofenac (VOLTAREN) 75 MG EC tablet Take 1 tablet by mouth 2 times daily With food 20 tablet 0    finasteride (PROSCAR) 5 MG tablet TAKE 1 TABLET BY MOUTH EVERY DAY 90 tablet 2    oxybutynin (DITROPAN-XL) 10 MG extended release tablet TAKE 1 TABLET BY MOUTH EVERY DAY 90 tablet 2    gabapentin (NEURONTIN) 800 MG tablet TAKE 1 TABLET BY MOUTH 3 TIMES DAILY  DAYS. 270 tablet 1    Pseudoephedrine-Guaifenesin (MUCINEX D PO) Take 1 tablet by mouth daily. vitamin D-3 (CHOLECALCIFEROL) 5000 UNITS TABS Take 5,000 Units by mouth daily. No current facility-administered medications for this visit.      No Known Allergies    Review of Systems    Objective:  Ht 5' 9\" (1.753 m)   Wt 268 lb (121.6 kg)   BMI 39.58 kg/m²     BP Readings from Last 3 Encounters:   12/09/21 120/82   11/02/21 111/76   11/02/21 124/67       Pulse Readings from Last 3 Encounters:   11/02/21 76   10/20/21 74   08/30/21 68       Wt Readings from Last 3 Encounters:   08/19/22 268 lb (121.6 kg)   12/09/21 269 lb (122 kg)   11/02/21 278 lb (126.1 kg)       Physical Exam  Constitutional:       General: He is not in acute distress. Appearance: He is well-developed. Eyes:      General: No scleral icterus. Conjunctiva/sclera: Conjunctivae normal.   Cardiovascular:      Rate and Rhythm: Normal rate and regular rhythm. Heart sounds: Normal heart sounds. No murmur heard. No gallop. Pulmonary:      Effort: Pulmonary effort is normal. No respiratory distress. Breath sounds: Normal breath sounds. No wheezing, rhonchi or rales. Abdominal:      General: Bowel sounds are normal. There is no distension. Palpations: Abdomen is soft. Tenderness: There is no abdominal tenderness. Musculoskeletal:         General: No swelling. Skin:     Findings: No erythema or rash. Neurological:      Mental Status: He is alert. Assessment/Plan:      Diagnosis Orders   1. Hypercholesteremia        2. Prostate cancer ([address]        3. Severe obesity (BMI 35.0-39. 9) with comorbidity ([address]        4. Unspecified inflammatory spondylopathy, lumbar region ([address]        5. Recurrent major depressive disorder, in remission ([facility]        6. Primary hypertension        7. OAB (overactive bladder)        8.  DDD (degenerative disc disease), lumbar        Diet/ activity dw/ pt  F/u urology UC - s/p XRT to prostate 1 year ago  Bp control good on hctz/ metoprolol  Ditropan xl 10 for oab  Gabapentin w/ cymbalta for pain and mood w/ diclofenac  Ppi at lowest effective dose  Lipitor 80 for lipids  Proscar for bph  Lasix/ kcl every other day w/ good control  Vaccines reviewed and covid booster/ flu shot in a month  Monitor bp - precautions d/ w pt including low salt/ caffeine moderation/ diet/ exercise w/ low fat / increased fruit / veggies d/w pt  Check machine w/ bp at office when gets lipid,cmp, psa done end of October  O/w f/u David De Jesus MD, MD  8/19/2022  1:27 PM

## 2022-08-24 RX ORDER — OXYBUTYNIN CHLORIDE 10 MG/1
TABLET, EXTENDED RELEASE ORAL
Qty: 90 TABLET | Refills: 1 | Status: SHIPPED | OUTPATIENT
Start: 2022-08-24

## 2022-08-24 RX ORDER — FINASTERIDE 5 MG/1
TABLET, FILM COATED ORAL
Qty: 90 TABLET | Refills: 1 | Status: SHIPPED | OUTPATIENT
Start: 2022-08-24

## 2022-09-08 RX ORDER — POTASSIUM CHLORIDE 750 MG/1
TABLET, FILM COATED, EXTENDED RELEASE ORAL
Qty: 90 TABLET | Refills: 0 | Status: SHIPPED | OUTPATIENT
Start: 2022-09-08

## 2022-09-15 ENCOUNTER — OFFICE VISIT (OUTPATIENT)
Dept: SLEEP MEDICINE | Age: 72
End: 2022-09-15
Payer: MEDICARE

## 2022-09-15 VITALS
HEIGHT: 76 IN | DIASTOLIC BLOOD PRESSURE: 80 MMHG | BODY MASS INDEX: 32.39 KG/M2 | WEIGHT: 266 LBS | TEMPERATURE: 97.5 F | RESPIRATION RATE: 18 BRPM | SYSTOLIC BLOOD PRESSURE: 130 MMHG | HEART RATE: 67 BPM | OXYGEN SATURATION: 97 %

## 2022-09-15 DIAGNOSIS — G47.33 OSA ON CPAP: Primary | ICD-10-CM

## 2022-09-15 DIAGNOSIS — Z99.89 DEPENDENCE ON OTHER ENABLING MACHINES AND DEVICES: ICD-10-CM

## 2022-09-15 DIAGNOSIS — E66.01 CLASS 2 SEVERE OBESITY DUE TO EXCESS CALORIES WITH SERIOUS COMORBIDITY AND BODY MASS INDEX (BMI) OF 39.0 TO 39.9 IN ADULT (HCC): ICD-10-CM

## 2022-09-15 DIAGNOSIS — Z99.89 OSA ON CPAP: Primary | ICD-10-CM

## 2022-09-15 PROCEDURE — 99214 OFFICE O/P EST MOD 30 MIN: CPT | Performed by: PSYCHIATRY & NEUROLOGY

## 2022-09-15 PROCEDURE — 3017F COLORECTAL CA SCREEN DOC REV: CPT | Performed by: PSYCHIATRY & NEUROLOGY

## 2022-09-15 PROCEDURE — 1123F ACP DISCUSS/DSCN MKR DOCD: CPT | Performed by: PSYCHIATRY & NEUROLOGY

## 2022-09-15 PROCEDURE — 1036F TOBACCO NON-USER: CPT | Performed by: PSYCHIATRY & NEUROLOGY

## 2022-09-15 PROCEDURE — G8427 DOCREV CUR MEDS BY ELIG CLIN: HCPCS | Performed by: PSYCHIATRY & NEUROLOGY

## 2022-09-15 PROCEDURE — G8417 CALC BMI ABV UP PARAM F/U: HCPCS | Performed by: PSYCHIATRY & NEUROLOGY

## 2022-09-15 ASSESSMENT — SLEEP AND FATIGUE QUESTIONNAIRES
HOW LIKELY ARE YOU TO NOD OFF OR FALL ASLEEP IN A CAR, WHILE STOPPED FOR A FEW MINUTES IN TRAFFIC: 0
HOW LIKELY ARE YOU TO NOD OFF OR FALL ASLEEP WHILE SITTING INACTIVE IN A PUBLIC PLACE: 0
HOW LIKELY ARE YOU TO NOD OFF OR FALL ASLEEP WHILE WATCHING TV: 0
HOW LIKELY ARE YOU TO NOD OFF OR FALL ASLEEP WHEN YOU ARE A PASSENGER IN A CAR FOR AN HOUR WITHOUT A BREAK: 0
HOW LIKELY ARE YOU TO NOD OFF OR FALL ASLEEP WHILE SITTING AND TALKING TO SOMEONE: 0
HOW LIKELY ARE YOU TO NOD OFF OR FALL ASLEEP WHILE SITTING QUIETLY AFTER LUNCH WITHOUT ALCOHOL: 0
ESS TOTAL SCORE: 0
HOW LIKELY ARE YOU TO NOD OFF OR FALL ASLEEP WHILE SITTING AND READING: 0
HOW LIKELY ARE YOU TO NOD OFF OR FALL ASLEEP WHILE LYING DOWN TO REST IN THE AFTERNOON WHEN CIRCUMSTANCES PERMIT: 0

## 2022-09-15 NOTE — PROGRESS NOTES
Oscar Butter Even         : 1950        PHONE: [phone] (home)   [] [address]     [x] [address]      [] [facility]     [][facility]    [] Justin Root  [] [facility]     [] Other:    Diagnosis: [x] MATIAS (G47.33) [] CSA (G47.31) [] Apnea (G47.30)   Length of Need: [] 12 Months [x] 99 Months [] Other:    Machine (GIORGI!): [] Respironics Dream Station      Auto [] ResMed AirSense     Auto [] Other:     []  CPAP () [] Bilevel ()   Mode: [] Auto [] Spontaneous    Mode: [] Auto [] Spontaneous                            Comfort Settings:   - Ramp Pressure: 5 cmH2O                                        - Ramp time: 15 min                                     -  Flex/EPR - 3 full time                                    - For ResMed Bilevel (TiMax-4 sec   TiMin- 0.2 sec)     Humidifier: [] Heated ()        [x] Water chamber replacement ()/ 1 per 6 months        Mask:   [] Nasal () /1 per 3 months [x] Full Face () /1 per 3 months   [] Patient choice -Size and fit mask [x] Patient Choice - Size and fit mask   [] Dispense:  [] Dispense:    [] Headgear () / 1 per 3 months [x] Headgear () / 1 per 3 months   [] Replacement Nasal Cushion ()/2 per month [x] Interface Replacement ()/1 per month   [] Replacement Nasal Pillows ()/2 per month         Tubing: [x] Heated ()/1 per 3 months    [] Standard ()/1 per 3 months [] Other:           Filters: [x] Non-disposable ()/1 per 6 months     [x] Ultra-Fine, Disposable ()/2 per month        Miscellaneous: [] Chin Strap ()/ 1 per 6 months [] O2 bleed-in:       LPM   [] Oximetry on CPAP/Bilevel []  Other:          Start Order Date: 09/15/22    MEDICAL JUSTIFICATION:  I, the undersigned, certify that the above prescribed supplies are medically necessary for this patients wellbeing.   In my opinion, the supplies are both reasonable and necessary in reference to accepted standards of medicalpractice in treatment of this patients condition.     Mary Urbina MD      NPI: [id number]       Order Signed Date: 09/15/22    Electronically signed by Mary Urbina MD on 9/15/2022 at 10:48 AM

## 2022-09-15 NOTE — PROGRESS NOTES
MD SMOOTH Stiles Board Certified in Sleep Medicine  Certified in [address] [place] [address] Certified in Neurology [address] [place], [place] [id number] [address]  [phone]   [address] [place] [address] Luisa Ne                      [address] [facility]  [address] [address] [phone]    Subjective:     Patient ID: Attila Agosto is a 67 y.o. male. Chief Complaint   Patient presents with    Follow-up    Sleep Apnea         HPI:        Attila Agosto is a 67 y.o. male was seen today as a follow for severe obstructive sleep apnea with apnea hypopnea index of 75.2/hr with lowest O2 saturation of 67%, patient spent about 100.9 minutes below 90%. Patient is using the PAP machine about 100% of the time, more than 4 hours a nightabout  99 %, in total average of 9:39 hours a night in last 90 days. Currently on PAP at 13 cm (), the AHI is only 3.9 events per hour at this pressure. Patient improved regarding daytime sleepiness and fatigue, wakes up refreshed in the morning. The Patient scored Oneida Sleepiness Score: 0 on Oneida Sleepiness Scale ( more than 10 is indicative of daytime sleepiness)   Patient has no problem with PAP pressure or mask, uses FFM. Lost 12 pounds since last year. DOT/CDL - N/A        Previous Report(s)Reviewed: historical medical records         Social History     Socioeconomic History    Marital status:       Spouse name: Not on file    Number of children: Not on file    Years of education: Not on file    Highest education level: Not on file   Occupational History    Occupation: Retired - owner business   Tobacco Use    Smoking status: Former     Packs/day: 2.00     Years: 8.00     Pack years: 16.00     Types: Cigarettes     Quit date:      Years since quittin.7 Smokeless tobacco: Never    Tobacco comments:     quit smoking 40 years ago   Vaping Use    Vaping Use: Never used   Substance and Sexual Activity    Alcohol use: Yes     Alcohol/week: 28.0 standard drinks     Types: 28 Shots of liquor per week    Drug use: No    Sexual activity: Not Currently   Other Topics Concern    Not on file   Social History Narrative    Cares for wife who has breast and pancreatic cancer. Recent death of his son from liver and kidney cancer. No exercise program. 8/11/20     Social Determinants of Health     Financial Resource Strain: Low Risk     Difficulty of Paying Living Expenses: Not hard at all   Food Insecurity: No Food Insecurity    Worried About Running Out of Food in the Last Year: Never true    Ran Out of Food in the Last Year: Never true   Transportation Needs: No Transportation Needs    Lack of Transportation (Medical): No    Lack of Transportation (Non-Medical): No   Physical Activity: Not on file   Stress: Not on file   Social Connections: Not on file   Intimate Partner Violence: Not on file   Housing Stability: Not on file       Prior to Admission medications    Medication Sig Start Date End Date Taking?  Authorizing Provider   potassium chloride (KLOR-CON) 10 MEQ extended release tablet TAKE 1 TABLET BY MOUTH EVERY DAY 9/8/22  Yes Laurita Galarza MD   finasteride (PROSCAR) 5 MG tablet TAKE 1 TABLET BY MOUTH EVERY DAY 8/24/22  Yes Laurita Galarza MD   oxybutynin (DITROPAN-XL) 10 MG extended release tablet TAKE 1 TABLET BY MOUTH EVERY DAY 8/24/22  Yes Laurita Galarza MD   metoprolol succinate (TOPROL XL) 100 MG extended release tablet TAKE 1.5 TABLETS BY MOUTH EVERY DAY 7/25/22  Yes Laurita Galarza MD   hydroCHLOROthiazide (HYDRODIURIL) 25 MG tablet TAKE 1 TABLET BY MOUTH EVERY DAY 7/18/22  Yes Laurita Galarza MD   telmisartan (MICARDIS) 80 MG tablet TAKE 1 TABLET BY MOUTH EVERY DAY 7/5/22  Yes Gayathri Rainey, APRN - [facility]GR[facility]   traZODone (DESYREL) 100 MG tablet TAKE 2 TO 3 TABLETS BY MOUTH NIGHTLY. 7/5/22  Yes Luiaz Perez MD   furosemide (LASIX) 40 MG tablet TAKE 1 TABLET BY MOUTH EVERY DAY 6/27/22  Yes Luiza Perez MD   DULoxetine (CYMBALTA) 60 MG extended release capsule TAKE 1 CAPSULE BY MOUTH EVERY DAY 6/27/22  Yes Luiza Perez MD   tamsulosin (FLOMAX) 0.4 MG capsule TAKE 1 CAPSULE BY MOUTH EVERY DAY  Patient taking differently: TAKE 1 CAPSULE BY MOUTH EVERY OTHER DAY 2/22/22  Yes Luiza Perez MD   tiZANidine (ZANAFLEX) 4 MG tablet TAKE 2 TABLETS BY MOUTH EVERY EVENING 2/22/22  Yes Luiza Perez MD   omeprazole (PRILOSEC) 20 MG delayed release capsule TAKE 1 CAPSULE BY MOUTH EVERY DAY 12/20/21  Yes Luiza Perez MD   atorvastatin (LIPITOR) 80 MG tablet TAKE 1 TABLET BY MOUTH EVERY DAY 12/20/21  Yes Luiza Perez MD   diclofenac (VOLTAREN) 75 MG EC tablet Take 1 tablet by mouth 2 times daily With food 12/9/21  Yes Luiza Perez MD   gabapentin (NEURONTIN) 800 MG tablet TAKE 1 TABLET BY MOUTH 3 TIMES DAILY  DAYS. 10/28/21 6/14/24 Yes Luiza Perez MD   Pseudoephedrine-Guaifenesin [facility] D PO) Take 1 tablet by mouth daily. Yes Historical Provider, MD   vitamin D-3 (CHOLECALCIFEROL) 5000 UNITS TABS Take 5,000 Units by mouth daily.    Yes Historical Provider, MD       Allergies as of 09/15/2022    (No Known Allergies)       Patient Active Problem List   Diagnosis    Hypercholesteremia    Hypertension    Insomnia    DDD (degenerative disc disease), lumbar    Arthritis, hip    BPH (benign prostatic hyperplasia)    Peyronie disease    Depression    Arthropathy of lumbar facet joint    Degeneration of lumbar or lumbosacral intervertebral disc    OAB (overactive bladder)    Liver cyst    MATIAS (obstructive sleep apnea)    Unspecified inflammatory spondylopathy, lumbar region [facility])    Recurrent major depressive disorder, in remission ([facility]    Bilateral carotid artery stenosis    Prostate cancer [facility])       Past Medical History:   Diagnosis Date    Arthritis, hip 6/29/2011    Arthropathy of lumbar facet joint 7/23/2013    BPH (benign prostatic hyperplasia) 6/29/2011    Current smoker     DDD (degenerative disc disease), lumbar 6/29/2011    Degeneration of lumbar or lumbosacral intervertebral disc 9/24/2013    Depression 11/11/2011    Hypercholesteremia 6/29/2011    Hyperlipidemia     Hypertension     Insomnia     Liver disease     Obstructive sleep apnea on CPAP     Painful respiration     Peyronie disease 6/29/2011       Past Surgical History:   Procedure Laterality Date    COLONOSCOPY  2008    normal,    COLONOSCOPY  2018    [facility]    COLONOSCOPY N/A 11/2/2021    COLONOSCOPY DIAGNOSTIC performed by Justin Valle MD at [address] [facility] Right 06/05/2020    Dupuytren's contraction deformity of rt 5th finger corrected    JOINT REPLACEMENT Left 2000    hip - [facility] - [facility]    TONSILLECTOMY AND ADENOIDECTOMY         Family History   Problem Relation Age of Onset    Heart Failure Mother     Diabetes type 2  Mother     Lung Cancer Father         smoker    Dementia Sister     Heart Surgery Brother     Coronary Art Dis Brother     High Cholesterol Brother     Hypertension Brother     Kidney Cancer Son 39        liver cancer       Review of Systems    Objective:     Vitals:  Weight BMI Neck circumference    Wt Readings from Last 3 Encounters:   09/15/22 266 lb (120.7 kg)   08/19/22 268 lb (121.6 kg)   12/09/21 269 lb (122 kg)    Body mass index is 32.38 kg/m². BP HR SaO2   BP Readings from Last 3 Encounters:   09/15/22 130/80   08/19/22 138/76   12/09/21 120/82    Pulse Readings from Last 3 Encounters:   09/15/22 67   08/19/22 71   11/02/21 76    SpO2 Readings from Last 3 Encounters:   09/15/22 97%   08/19/22 95%   11/02/21 94%        Themandibular molar Class :   [x]1 []2 []3      Mallampati I Airway Classification:   []1 []2 []3 [x]4      Physical Exam  Vitals and nursing note reviewed.    HENT:      Nose: Nose normal.   Cardiovascular:      Rate and Rhythm: Normal rate and regular rhythm. Pulmonary:      Effort: Pulmonary effort is normal.      Breath sounds: Normal breath sounds. Musculoskeletal:      Right lower leg: No edema. Left lower leg: No edema.       :   Severe Obstructive Sleep Apnea/Hypopnea Syndrome under good control on PAP at 13 cmwp. Diagnosis Orders   1. MATIAS on CPAP        2. Dependence on other enabling machines and devices        3. Class 2 severe obesity due to excess calories with serious comorbidity and body mass index (BMI) of 39.0 to 39.9 in [facility])          Plan: Will continue the PAP at 13 cmwp. I will order PAP supplies, mask, filters. Continue the weight loss: The proportionality between weight and AHI. With 10% weight change, the AHI has a 27% proportionate change. With 20% weight change, the AHI has a 45-50% proportionate change. No orders of the defined types were placed in this encounter. Return in about 1 year (around 9/15/2023) for Reveiwing CPAP usage and compliance report and tro.     Myriam Carballo MD  Medical Director - [facility]

## 2022-09-22 RX ORDER — ATORVASTATIN CALCIUM 80 MG/1
TABLET, FILM COATED ORAL
Qty: 90 TABLET | Refills: 1 | Status: SHIPPED | OUTPATIENT
Start: 2022-09-22

## 2022-09-23 ENCOUNTER — TELEPHONE (OUTPATIENT)
Dept: FAMILY MEDICINE CLINIC | Age: 72
End: 2022-09-23

## 2022-09-23 DIAGNOSIS — E78.00 HYPERCHOLESTEREMIA: ICD-10-CM

## 2022-09-23 DIAGNOSIS — C61 PROSTATE CANCER (HCC): Primary | ICD-10-CM

## 2022-09-23 NOTE — TELEPHONE ENCOUNTER
Patient stopped in and wants to have blood work done, but no orders in epic. Please give him a call back.

## 2022-09-23 NOTE — TELEPHONE ENCOUNTER
[facility] 08/19/2022 : Check machine w/ bp at office when gets lipid,cmp, psa done end of October. PENDED LABS. YAKOV

## 2022-09-26 RX ORDER — OMEPRAZOLE 20 MG/1
CAPSULE, DELAYED RELEASE ORAL
Qty: 90 CAPSULE | Refills: 1 | Status: SHIPPED | OUTPATIENT
Start: 2022-09-26

## 2022-09-26 RX ORDER — DULOXETIN HYDROCHLORIDE 60 MG/1
CAPSULE, DELAYED RELEASE ORAL
Qty: 90 CAPSULE | Refills: 0 | Status: SHIPPED | OUTPATIENT
Start: 2022-09-26

## 2022-09-28 DIAGNOSIS — M16.10 ARTHRITIS, HIP: ICD-10-CM

## 2022-09-28 DIAGNOSIS — M47.816 ARTHROPATHY OF LUMBAR FACET JOINT: ICD-10-CM

## 2022-09-28 DIAGNOSIS — M51.37 DEGENERATION OF LUMBAR OR LUMBOSACRAL INTERVERTEBRAL DISC: ICD-10-CM

## 2022-09-28 DIAGNOSIS — M51.36 DDD (DEGENERATIVE DISC DISEASE), LUMBAR: ICD-10-CM

## 2022-09-28 RX ORDER — GABAPENTIN 800 MG/1
800 TABLET ORAL 3 TIMES DAILY
Qty: 270 TABLET | Refills: 1 | Status: SHIPPED | OUTPATIENT
Start: 2022-09-28 | End: 2023-03-27

## 2022-09-29 RX ORDER — TELMISARTAN 80 MG/1
TABLET ORAL
Qty: 90 TABLET | Refills: 1 | Status: SHIPPED | OUTPATIENT
Start: 2022-09-29

## 2022-10-05 RX ORDER — TRAZODONE HYDROCHLORIDE 100 MG/1
TABLET ORAL
Qty: 270 TABLET | Refills: 0 | Status: SHIPPED | OUTPATIENT
Start: 2022-10-05

## 2022-10-17 RX ORDER — HYDROCHLOROTHIAZIDE 25 MG/1
TABLET ORAL
Qty: 90 TABLET | Refills: 0 | Status: SHIPPED | OUTPATIENT
Start: 2022-10-17

## 2022-10-20 ENCOUNTER — PATIENT MESSAGE (OUTPATIENT)
Dept: FAMILY MEDICINE CLINIC | Age: 72
End: 2022-10-20

## 2022-10-20 NOTE — TELEPHONE ENCOUNTER
From: Fredy Macias Even  To: Dr. Yessenia Redmond: 10/20/2022 2:24 PM EDT  Subject: blood work    Dr. Jamarcus Alvarenga, At my last appointment you said I should get my normal blood work plus PSA done in October I spoke with some one about scheduling and appointment and was told someone would call me but I haven't heard from anyone . Could you check on this? Thank You.     Shahriar Agosto

## 2022-10-27 RX ORDER — TIZANIDINE 4 MG/1
TABLET ORAL
Qty: 180 TABLET | Refills: 0 | Status: SHIPPED | OUTPATIENT
Start: 2022-10-27

## 2022-11-11 RX ORDER — TAMSULOSIN HYDROCHLORIDE 0.4 MG/1
CAPSULE ORAL
Qty: 90 CAPSULE | Refills: 1 | Status: SHIPPED | OUTPATIENT
Start: 2022-11-11

## 2022-11-21 LAB — PROSTATE SPECIFIC ANTIGEN: 0.1 NG/ML

## 2022-12-09 ENCOUNTER — NURSE ONLY (OUTPATIENT)
Dept: FAMILY MEDICINE CLINIC | Age: 72
End: 2022-12-09
Payer: MEDICARE

## 2022-12-09 DIAGNOSIS — E78.00 HYPERCHOLESTEREMIA: ICD-10-CM

## 2022-12-09 DIAGNOSIS — C61 PROSTATE CANCER (HCC): ICD-10-CM

## 2022-12-09 LAB
A/G RATIO: 2.6 (ref 1.1–2.2)
ALBUMIN SERPL-MCNC: 4.4 G/DL (ref 3.4–5)
ALP BLD-CCNC: 90 U/L (ref 40–129)
ALT SERPL-CCNC: 20 U/L (ref 10–40)
ANION GAP SERPL CALCULATED.3IONS-SCNC: 13 MMOL/L (ref 3–16)
AST SERPL-CCNC: 14 U/L (ref 15–37)
BILIRUB SERPL-MCNC: 0.6 MG/DL (ref 0–1)
BUN BLDV-MCNC: 22 MG/DL (ref 7–20)
CALCIUM SERPL-MCNC: 9.4 MG/DL (ref 8.3–10.6)
CHLORIDE BLD-SCNC: 99 MMOL/L (ref 99–110)
CHOLESTEROL, TOTAL: 196 MG/DL (ref 0–199)
CO2: 26 MMOL/L (ref 21–32)
CREAT SERPL-MCNC: 1.2 MG/DL (ref 0.8–1.3)
GFR SERPL CREATININE-BSD FRML MDRD: >60 ML/MIN/{1.73_M2}
GLUCOSE BLD-MCNC: 95 MG/DL (ref 70–99)
HDLC SERPL-MCNC: 54 MG/DL (ref 40–60)
LDL CHOLESTEROL CALCULATED: 113 MG/DL
POTASSIUM SERPL-SCNC: 3.8 MMOL/L (ref 3.5–5.1)
SODIUM BLD-SCNC: 138 MMOL/L (ref 136–145)
TOTAL PROTEIN: 6.1 G/DL (ref 6.4–8.2)
TRIGL SERPL-MCNC: 144 MG/DL (ref 0–150)
VLDLC SERPL CALC-MCNC: 29 MG/DL

## 2022-12-09 PROCEDURE — 36415 COLL VENOUS BLD VENIPUNCTURE: CPT | Performed by: FAMILY MEDICINE

## 2022-12-27 RX ORDER — DULOXETIN HYDROCHLORIDE 60 MG/1
CAPSULE, DELAYED RELEASE ORAL
Qty: 90 CAPSULE | Refills: 0 | Status: SHIPPED | OUTPATIENT
Start: 2022-12-27

## 2023-01-04 RX ORDER — TRAZODONE HYDROCHLORIDE 100 MG/1
TABLET ORAL
Qty: 270 TABLET | Refills: 0 | Status: SHIPPED | OUTPATIENT
Start: 2023-01-04

## 2023-01-25 RX ORDER — HYDROCHLOROTHIAZIDE 25 MG/1
TABLET ORAL
Qty: 90 TABLET | Refills: 0 | Status: SHIPPED | OUTPATIENT
Start: 2023-01-25

## 2023-01-30 RX ORDER — TIZANIDINE 4 MG/1
TABLET ORAL
Qty: 180 TABLET | Refills: 0 | Status: SHIPPED | OUTPATIENT
Start: 2023-01-30

## 2023-01-30 RX ORDER — METOPROLOL SUCCINATE 100 MG/1
TABLET, EXTENDED RELEASE ORAL
Qty: 135 TABLET | Refills: 0 | Status: SHIPPED | OUTPATIENT
Start: 2023-01-30

## 2023-02-15 ENCOUNTER — OFFICE VISIT (OUTPATIENT)
Dept: FAMILY MEDICINE CLINIC | Age: 73
End: 2023-02-15

## 2023-02-15 VITALS
HEIGHT: 76 IN | BODY MASS INDEX: 32.63 KG/M2 | WEIGHT: 268 LBS | DIASTOLIC BLOOD PRESSURE: 80 MMHG | SYSTOLIC BLOOD PRESSURE: 132 MMHG | OXYGEN SATURATION: 97 % | HEART RATE: 66 BPM

## 2023-02-15 DIAGNOSIS — C61 PROSTATE CANCER (HCC): ICD-10-CM

## 2023-02-15 DIAGNOSIS — G62.9 PERIPHERAL POLYNEUROPATHY: ICD-10-CM

## 2023-02-15 DIAGNOSIS — F33.40 RECURRENT MAJOR DEPRESSIVE DISORDER, IN REMISSION (HCC): ICD-10-CM

## 2023-02-15 DIAGNOSIS — M46.96 UNSPECIFIED INFLAMMATORY SPONDYLOPATHY, LUMBAR REGION (HCC): ICD-10-CM

## 2023-02-15 DIAGNOSIS — N32.81 OAB (OVERACTIVE BLADDER): ICD-10-CM

## 2023-02-15 DIAGNOSIS — M51.36 DDD (DEGENERATIVE DISC DISEASE), LUMBAR: ICD-10-CM

## 2023-02-15 DIAGNOSIS — N40.1 BENIGN PROSTATIC HYPERPLASIA WITH LOWER URINARY TRACT SYMPTOMS, SYMPTOM DETAILS UNSPECIFIED: ICD-10-CM

## 2023-02-15 DIAGNOSIS — R73.9 HYPERGLYCEMIA: ICD-10-CM

## 2023-02-15 DIAGNOSIS — G47.33 OSA (OBSTRUCTIVE SLEEP APNEA): ICD-10-CM

## 2023-02-15 DIAGNOSIS — F51.01 PRIMARY INSOMNIA: ICD-10-CM

## 2023-02-15 DIAGNOSIS — M67.432 GANGLION CYST OF DORSUM OF LEFT WRIST: ICD-10-CM

## 2023-02-15 DIAGNOSIS — I10 PRIMARY HYPERTENSION: Primary | ICD-10-CM

## 2023-02-15 DIAGNOSIS — E78.00 HYPERCHOLESTEREMIA: ICD-10-CM

## 2023-02-15 DIAGNOSIS — M19.90 ARTHRITIS: ICD-10-CM

## 2023-02-15 LAB — HBA1C MFR BLD: 5.4 %

## 2023-02-15 SDOH — ECONOMIC STABILITY: HOUSING INSECURITY
IN THE LAST 12 MONTHS, WAS THERE A TIME WHEN YOU DID NOT HAVE A STEADY PLACE TO SLEEP OR SLEPT IN A SHELTER (INCLUDING NOW)?: NO

## 2023-02-15 SDOH — ECONOMIC STABILITY: FOOD INSECURITY: WITHIN THE PAST 12 MONTHS, THE FOOD YOU BOUGHT JUST DIDN'T LAST AND YOU DIDN'T HAVE MONEY TO GET MORE.: NEVER TRUE

## 2023-02-15 SDOH — ECONOMIC STABILITY: FOOD INSECURITY: WITHIN THE PAST 12 MONTHS, YOU WORRIED THAT YOUR FOOD WOULD RUN OUT BEFORE YOU GOT MONEY TO BUY MORE.: NEVER TRUE

## 2023-02-15 SDOH — ECONOMIC STABILITY: INCOME INSECURITY: HOW HARD IS IT FOR YOU TO PAY FOR THE VERY BASICS LIKE FOOD, HOUSING, MEDICAL CARE, AND HEATING?: NOT HARD AT ALL

## 2023-02-15 ASSESSMENT — PATIENT HEALTH QUESTIONNAIRE - PHQ9
2. FEELING DOWN, DEPRESSED OR HOPELESS: 0
SUM OF ALL RESPONSES TO PHQ QUESTIONS 1-9: 0
SUM OF ALL RESPONSES TO PHQ9 QUESTIONS 1 & 2: 0
1. LITTLE INTEREST OR PLEASURE IN DOING THINGS: 0
SUM OF ALL RESPONSES TO PHQ QUESTIONS 1-9: 0

## 2023-02-15 NOTE — PROGRESS NOTES
[facility]  Clinic Note    Date: 2/15/2023                                               Subjective:     Chief Complaint   Patient presents with    Hypertension     HTN ROUTINE FOLLOW UP      HPI    Labs reviewed 12/22  Psa 0.1 on check 11/21 - sees urology UC w/ hx of prostate cancer - tx'd w/ XRT 7/21. Sees urology q6m - reviewed note w/ dr. Marge Grant 11/21/22  Had low back injections for a year - 4 injections - did RFA each side and pain went away after this was done  Seen by chiropractor yesterday - neuropathy pain lateral left lower leg. ? About neuropathy tx  Taking gabapentin twice daily. 800mg  Bump on dorsal wrist - growing bigger - no effect  Bothered by it.   Left wrist  Hx of hyperglycemia -   Nocturia x1 - flomax every other night  Tizanidine/ trazodone w/ cpap machine helping sleep  Would like a1c as high bs in past though last one okay w/ neuropathy      BP Readings from Last 3 Encounters:   02/15/23 132/80   09/15/22 130/80   08/19/22 138/76     Pulse Readings from Last 3 Encounters:   02/15/23 66   09/15/22 67   08/19/22 71     Wt Readings from Last 3 Encounters:   02/15/23 268 lb (121.6 kg)   09/15/22 266 lb (120.7 kg)   08/19/22 268 lb (121.6 kg)            Patient Active Problem List    Diagnosis Date Noted    Prostate cancer ([facility] 08/19/2022    Recurrent major depressive disorder, in remission ([facility] [address] 04/23/2021    Bilateral carotid artery stenosis 04/23/2021    Unspecified inflammatory spondylopathy, lumbar region ([address] 11/11/2020    MATIAS (obstructive sleep apnea)     Liver cyst 12/14/2017    OAB (overactive bladder) 08/31/2016    Degeneration of lumbar or lumbosacral intervertebral disc 09/24/2013    Arthropathy of lumbar facet joint 07/23/2013    Depression 11/11/2011    Hypercholesteremia 06/29/2011    Hypertension 06/29/2011    Insomnia 06/29/2011    DDD (degenerative disc disease), lumbar 06/29/2011    Arthritis, hip 06/29/2011    BPH (benign prostatic hyperplasia) 06/29/2011 Peyronie disease 06/29/2011     Past Medical History:   Diagnosis Date    Arthritis, hip 6/29/2011    Arthropathy of lumbar facet joint 7/23/2013    BPH (benign prostatic hyperplasia) 6/29/2011    Current smoker     DDD (degenerative disc disease), lumbar 6/29/2011    Degeneration of lumbar or lumbosacral intervertebral disc 9/24/2013    Depression 11/11/2011    Hypercholesteremia 6/29/2011    Hyperlipidemia     Hypertension     Insomnia     Liver disease     Obstructive sleep apnea on CPAP     Painful respiration     Peyronie disease 6/29/2011     Past Surgical History:   Procedure Laterality Date    COLONOSCOPY  2008    normal,    COLONOSCOPY  2018    [facility]    COLONOSCOPY N/A 11/2/2021    COLONOSCOPY DIAGNOSTIC performed by Adilson Marina MD at [address] Right 06/05/2020    Dupuytren's contraction deformity of rt 5th finger corrected    JOINT REPLACEMENT Left 2000    [facility]    TONSILLECTOMY AND ADENOIDECTOMY       Nurse Only on 12/09/2022   Component Date Value Ref Range Status    Cholesterol, Total 12/09/2022 196  0 - 199 mg/dL Final    Triglycerides 12/09/2022 144  0 - 150 mg/dL Final    HDL 12/09/2022 54  40 - 60 mg/dL Final    Comment: An HDL cholesterol less than 40 mg/dL is low and  constitutes a coronary heart disease risk factor. An HDL cholesterol greater than 60 mg/dL is a  negative risk factor for coronary heart disease.       LDL Calculated 12/09/2022 113 (A)  <100 mg/dL Final    VLDL Cholesterol Calculated 12/09/2022 29  Not Established mg/dL Final    Sodium 12/09/2022 138  136 - 145 mmol/L Final    Potassium 12/09/2022 3.8  3.5 - 5.1 mmol/L Final    Chloride 12/09/2022 99  99 - 110 mmol/L Final    CO2 12/09/2022 26  21 - 32 mmol/L Final    Anion Gap 12/09/2022 13  3 - 16 Final    Glucose 12/09/2022 95  70 - 99 mg/dL Final    BUN 12/09/2022 22 (A)  7 - 20 mg/dL Final    Creatinine 12/09/2022 1.2  0.8 - 1.3 mg/dL Final    Est, Glom Filt Rate 12/09/2022 >60  >60 Final    Comment: Pediatric calculator link  Axel.at. org/professionals/kdoqi/gfr_calculatorped  Effective Oct 3, 2022  These results are not intended for use in patients  <25years of age. eGFR results are calculated without  a race factor using the 2021 CKD-EPI equation. Careful  clinical correlation is recommended, particularly when  comparing to results calculated using previous equations. The CKD-EPI equation is less accurate in patients with  extremes of muscle mass, extra-renal metabolism of  creatinine, excessive creatinine ingestion, or following  therapy that affects renal tubular secretion. Calcium 12/09/2022 9.4  8.3 - 10.6 mg/dL Final    Total Protein 12/09/2022 6.1 (A)  6.4 - 8.2 g/dL Final    Albumin 12/09/2022 4.4  3.4 - 5.0 g/dL Final    Albumin/Globulin Ratio 12/09/2022 2.6 (A)  1.1 - 2.2 Final    Total Bilirubin 12/09/2022 0.6  0.0 - 1.0 mg/dL Final    Alkaline Phosphatase 12/09/2022 90  40 - 129 U/L Final    ALT 12/09/2022 20  10 - 40 U/L Final    AST 12/09/2022 14 (A)  15 - 37 U/L Final    PSA 11/21/2022 0.10  ng/mL Final     Family History   Problem Relation Age of Onset    Heart Failure Mother     Diabetes type 2  Mother     Lung Cancer Father         smoker    Dementia Sister     Heart Surgery Brother     Coronary Art Dis Brother     High Cholesterol Brother     Hypertension Brother     Kidney Cancer Son 39        liver cancer     Current Outpatient Medications   Medication Sig Dispense Refill    metoprolol succinate (TOPROL XL) 100 MG extended release tablet TAKE 1 AND 1/2 TABLETS BY MOUTH EVERY  tablet 0    tiZANidine (ZANAFLEX) 4 MG tablet TAKE 2 TABLETS BY MOUTH EVERY EVENING 180 tablet 0    hydroCHLOROthiazide (HYDRODIURIL) 25 MG tablet TAKE 1 TABLET BY MOUTH EVERY DAY 90 tablet 0    traZODone (DESYREL) 100 MG tablet TAKE 2 TO 3 TABLETS BY MOUTH NIGHTLY.  270 tablet 0    DULoxetine (CYMBALTA) 60 MG extended release capsule TAKE 1 CAPSULE BY MOUTH EVERY DAY 90 capsule 0    tamsulosin (FLOMAX) 0.4 MG capsule TAKE 1 CAPSULE BY MOUTH EVERY DAY 90 capsule 1    telmisartan (MICARDIS) 80 MG tablet TAKE 1 TABLET BY MOUTH EVERY DAY 90 tablet 1    gabapentin (NEURONTIN) 800 MG tablet TAKE 1 TABLET BY MOUTH 3 TIMES DAILY  DAYS. 270 tablet 1    omeprazole (PRILOSEC) 20 MG delayed release capsule TAKE 1 CAPSULE BY MOUTH EVERY DAY 90 capsule 1    atorvastatin (LIPITOR) 80 MG tablet TAKE 1 TABLET BY MOUTH EVERY DAY 90 tablet 1    potassium chloride (KLOR-CON) 10 MEQ extended release tablet TAKE 1 TABLET BY MOUTH EVERY DAY 90 tablet 0    finasteride (PROSCAR) 5 MG tablet TAKE 1 TABLET BY MOUTH EVERY DAY 90 tablet 1    oxybutynin (DITROPAN-XL) 10 MG extended release tablet TAKE 1 TABLET BY MOUTH EVERY DAY 90 tablet 1    furosemide (LASIX) 40 MG tablet TAKE 1 TABLET BY MOUTH EVERY DAY 90 tablet 0    diclofenac (VOLTAREN) 75 MG EC tablet Take 1 tablet by mouth 2 times daily With food 20 tablet 0    Pseudoephedrine-Guaifenesin (MUCINEX D PO) Take 1 tablet by mouth daily. vitamin D-3 (CHOLECALCIFEROL) 5000 UNITS TABS Take 5,000 Units by mouth daily. No current facility-administered medications for this visit. No Known Allergies    Review of Systems    Objective:  /80 (Site: Left Upper Arm, Position: Sitting, Cuff Size: Medium Adult)   Pulse 66   Ht 6' 4\" (1.93 m)   Wt 268 lb (121.6 kg)   SpO2 97%   BMI 32.62 kg/m²     BP Readings from Last 3 Encounters:   02/15/23 132/80   09/15/22 130/80   08/19/22 138/76       Pulse Readings from Last 3 Encounters:   02/15/23 66   09/15/22 67   08/19/22 71       Wt Readings from Last 3 Encounters:   02/15/23 268 lb (121.6 kg)   09/15/22 266 lb (120.7 kg)   08/19/22 268 lb (121.6 kg)       Physical Exam  Vitals and nursing note reviewed. Constitutional:       Appearance: He is well-developed. HENT:      Head: Normocephalic and atraumatic. Eyes:      General: No scleral icterus. Conjunctiva/sclera: Conjunctivae normal.   Pulmonary:      Effort: Pulmonary effort is normal.   Musculoskeletal:      Comments: Ess nl gait  Approximately 3-4cm cyst noninflamed dorsal left wrist   Skin:     General: Skin is warm and dry. Neurological:      Mental Status: He is alert and oriented to person, place, and time. Assessment/Plan:      Diagnosis Orders   1. Primary hypertension        2. MATIAS (obstructive sleep apnea)        3. Unspecified inflammatory spondylopathy, lumbar region ([facility]        4. Recurrent major depressive disorder, in remission ([facility] [address]        5. Prostate cancer ([facility] [address]        6. OAB (overactive bladder)        7. Benign prostatic hyperplasia with lower urinary tract symptoms, symptom details unspecified        8. Primary insomnia        9. Hypercholesteremia        10. DDD (degenerative disc disease), lumbar        11. Arthritis        12.  Peripheral polyneuropathy          Bp stable on micardis/ toprol/ hctz  Cont lipitor  On cymbalta for mood/ pain w/ voltaren/ gabapentin/ zanaflex - tolerates well  Finasteride for bph - weaning off flomax w/ ditropan per urology  On ppi - lowest effective dose encouraged  Labs  reviewed  A1c today  Cont cpap for matias - f/u sleep med  Sleep good presently  Mood stable on cymbalta  Will try weaning gabapentin to see how much this is helping  Tx option for neuropathy dw/ pt  - trial of capsaicin cream otc dw/ pt  Back care/ chiropractic  F/u 6 months/ prn  Cecelia Taylor MD, MD  2/15/2023  10:04 AM

## 2023-02-15 NOTE — ADDENDUM NOTE
Addended byMariana [facility] on: 2/15/2023 12:58 PM     Modules accepted: Level of Service

## 2023-02-22 RX ORDER — FINASTERIDE 5 MG/1
TABLET, FILM COATED ORAL
Qty: 90 TABLET | Refills: 1 | Status: SHIPPED | OUTPATIENT
Start: 2023-02-22

## 2023-02-22 RX ORDER — OXYBUTYNIN CHLORIDE 10 MG/1
TABLET, EXTENDED RELEASE ORAL
Qty: 90 TABLET | Refills: 1 | Status: SHIPPED | OUTPATIENT
Start: 2023-02-22

## 2023-03-22 RX ORDER — ATORVASTATIN CALCIUM 80 MG/1
TABLET, FILM COATED ORAL
Qty: 90 TABLET | Refills: 1 | Status: SHIPPED | OUTPATIENT
Start: 2023-03-22

## 2023-03-22 RX ORDER — OMEPRAZOLE 20 MG/1
CAPSULE, DELAYED RELEASE ORAL
Qty: 90 CAPSULE | Refills: 1 | Status: SHIPPED | OUTPATIENT
Start: 2023-03-22

## 2023-03-28 RX ORDER — TRAZODONE HYDROCHLORIDE 100 MG/1
TABLET ORAL
Qty: 270 TABLET | Refills: 0 | Status: SHIPPED | OUTPATIENT
Start: 2023-03-28

## 2023-04-03 RX ORDER — TELMISARTAN 80 MG/1
TABLET ORAL
Qty: 90 TABLET | Refills: 1 | Status: SHIPPED | OUTPATIENT
Start: 2023-04-03

## 2023-04-24 RX ORDER — DULOXETIN HYDROCHLORIDE 60 MG/1
CAPSULE, DELAYED RELEASE ORAL
Qty: 90 CAPSULE | Refills: 1 | Status: SHIPPED | OUTPATIENT
Start: 2023-04-24

## 2023-04-24 RX ORDER — TIZANIDINE 4 MG/1
TABLET ORAL
Qty: 180 TABLET | Refills: 1 | Status: SHIPPED | OUTPATIENT
Start: 2023-04-24

## 2023-04-24 RX ORDER — HYDROCHLOROTHIAZIDE 25 MG/1
TABLET ORAL
Qty: 90 TABLET | Refills: 1 | Status: SHIPPED | OUTPATIENT
Start: 2023-04-24

## 2023-05-01 RX ORDER — METOPROLOL SUCCINATE 100 MG/1
TABLET, EXTENDED RELEASE ORAL
Qty: 135 TABLET | Refills: 1 | Status: SHIPPED | OUTPATIENT
Start: 2023-05-01

## 2023-05-02 RX ORDER — FUROSEMIDE 40 MG/1
40 TABLET ORAL DAILY
Qty: 90 TABLET | Refills: 1 | Status: SHIPPED | OUTPATIENT
Start: 2023-05-02

## 2023-05-17 RX ORDER — TRAZODONE HYDROCHLORIDE 100 MG/1
TABLET ORAL
Qty: 90 TABLET | Refills: 2 | Status: SHIPPED | OUTPATIENT
Start: 2023-05-17

## 2023-05-22 RX ORDER — TAMSULOSIN HYDROCHLORIDE 0.4 MG/1
CAPSULE ORAL
Qty: 90 CAPSULE | Refills: 1 | Status: SHIPPED | OUTPATIENT
Start: 2023-05-22

## 2023-07-03 RX ORDER — OXYBUTYNIN CHLORIDE 10 MG/1
TABLET, EXTENDED RELEASE ORAL
Qty: 90 TABLET | Refills: 1 | Status: SHIPPED | OUTPATIENT
Start: 2023-07-03

## 2023-07-03 RX ORDER — FINASTERIDE 5 MG/1
TABLET, FILM COATED ORAL
Qty: 90 TABLET | Refills: 1 | Status: SHIPPED | OUTPATIENT
Start: 2023-07-03

## 2023-07-03 RX ORDER — TRAZODONE HYDROCHLORIDE 100 MG/1
TABLET ORAL
Qty: 270 TABLET | Refills: 0 | Status: SHIPPED | OUTPATIENT
Start: 2023-07-03

## 2023-07-03 RX ORDER — ATORVASTATIN CALCIUM 80 MG/1
TABLET, FILM COATED ORAL
Qty: 90 TABLET | Refills: 1 | Status: SHIPPED | OUTPATIENT
Start: 2023-07-03

## 2023-07-13 DIAGNOSIS — M51.37 DEGENERATION OF LUMBAR OR LUMBOSACRAL INTERVERTEBRAL DISC: ICD-10-CM

## 2023-07-13 DIAGNOSIS — M47.816 ARTHROPATHY OF LUMBAR FACET JOINT: ICD-10-CM

## 2023-07-13 DIAGNOSIS — M51.36 DDD (DEGENERATIVE DISC DISEASE), LUMBAR: ICD-10-CM

## 2023-07-13 DIAGNOSIS — M16.10 ARTHRITIS, HIP: ICD-10-CM

## 2023-07-14 RX ORDER — TELMISARTAN 80 MG/1
TABLET ORAL
Qty: 90 TABLET | Refills: 1 | OUTPATIENT
Start: 2023-07-14

## 2023-07-14 RX ORDER — TIZANIDINE 4 MG/1
TABLET ORAL
Qty: 180 TABLET | Refills: 1 | OUTPATIENT
Start: 2023-07-14

## 2023-07-14 RX ORDER — GABAPENTIN 800 MG/1
800 TABLET ORAL 3 TIMES DAILY
Qty: 270 TABLET | Refills: 0 | Status: SHIPPED | OUTPATIENT
Start: 2023-07-14 | End: 2023-10-12

## 2023-07-14 RX ORDER — OMEPRAZOLE 20 MG/1
CAPSULE, DELAYED RELEASE ORAL
Qty: 90 CAPSULE | Refills: 1 | OUTPATIENT
Start: 2023-07-14

## 2023-07-17 RX ORDER — POTASSIUM CHLORIDE 750 MG/1
TABLET, FILM COATED, EXTENDED RELEASE ORAL
Qty: 90 TABLET | Refills: 0 | Status: SHIPPED | OUTPATIENT
Start: 2023-07-17

## 2023-09-10 DIAGNOSIS — M51.37 DEGENERATION OF LUMBAR OR LUMBOSACRAL INTERVERTEBRAL DISC: ICD-10-CM

## 2023-09-10 DIAGNOSIS — M51.36 DDD (DEGENERATIVE DISC DISEASE), LUMBAR: ICD-10-CM

## 2023-09-10 DIAGNOSIS — M47.816 ARTHROPATHY OF LUMBAR FACET JOINT: ICD-10-CM

## 2023-09-10 DIAGNOSIS — M16.10 ARTHRITIS, HIP: ICD-10-CM

## 2023-09-11 RX ORDER — TRAZODONE HYDROCHLORIDE 100 MG/1
TABLET ORAL
Qty: 270 TABLET | Refills: 0 | Status: SHIPPED | OUTPATIENT
Start: 2023-09-11

## 2023-09-11 RX ORDER — GABAPENTIN 800 MG/1
800 TABLET ORAL 3 TIMES DAILY
Qty: 270 TABLET | Refills: 0 | Status: SHIPPED | OUTPATIENT
Start: 2023-09-11 | End: 2023-12-10

## 2023-09-11 RX ORDER — TIZANIDINE 4 MG/1
TABLET ORAL
Qty: 180 TABLET | Refills: 0 | Status: SHIPPED | OUTPATIENT
Start: 2023-09-11

## 2023-09-28 RX ORDER — HYDROCHLOROTHIAZIDE 25 MG/1
TABLET ORAL
Qty: 90 TABLET | Refills: 0 | Status: SHIPPED | OUTPATIENT
Start: 2023-09-28

## 2023-09-28 RX ORDER — OMEPRAZOLE 20 MG/1
CAPSULE, DELAYED RELEASE ORAL
Qty: 90 CAPSULE | Refills: 0 | Status: SHIPPED | OUTPATIENT
Start: 2023-09-28

## 2023-09-28 RX ORDER — TELMISARTAN 80 MG/1
TABLET ORAL
Qty: 90 TABLET | Refills: 0 | Status: SHIPPED | OUTPATIENT
Start: 2023-09-28

## 2023-09-28 RX ORDER — METOPROLOL SUCCINATE 100 MG/1
TABLET, EXTENDED RELEASE ORAL
Qty: 135 TABLET | Refills: 0 | Status: SHIPPED | OUTPATIENT
Start: 2023-09-28

## 2023-09-28 RX ORDER — DULOXETIN HYDROCHLORIDE 60 MG/1
CAPSULE, DELAYED RELEASE ORAL
Qty: 90 CAPSULE | Refills: 0 | Status: SHIPPED | OUTPATIENT
Start: 2023-09-28

## 2023-10-16 RX ORDER — POTASSIUM CHLORIDE 750 MG/1
TABLET, FILM COATED, EXTENDED RELEASE ORAL
Qty: 90 TABLET | Refills: 0 | Status: SHIPPED | OUTPATIENT
Start: 2023-10-16

## 2023-10-19 ENCOUNTER — PATIENT MESSAGE (OUTPATIENT)
Dept: FAMILY MEDICINE CLINIC | Age: 73
End: 2023-10-19

## 2023-10-19 NOTE — TELEPHONE ENCOUNTER
From: Nathanael Mario Even  To: Ifrah Griffin  Sent: 10/19/2023 11:13 AM EDT  Subject: refill question    i am trying to get a refill for furosemide but my chart will not allow me to.

## 2023-12-11 RX ORDER — OXYBUTYNIN CHLORIDE 10 MG/1
10 TABLET, EXTENDED RELEASE ORAL DAILY
Qty: 90 TABLET | Refills: 1 | Status: SHIPPED | OUTPATIENT
Start: 2023-12-11

## 2023-12-11 NOTE — TELEPHONE ENCOUNTER
Pt is calling to get  his refill on   Oxybutynin  10 mg  #90   last fill date ? ?    [facility] [phone]

## 2023-12-28 RX ORDER — TIZANIDINE 4 MG/1
8 TABLET ORAL NIGHTLY
Qty: 180 TABLET | Refills: 0 | Status: SHIPPED | OUTPATIENT
Start: 2023-12-28

## 2023-12-28 RX ORDER — TIZANIDINE 4 MG/1
TABLET ORAL
Qty: 180 TABLET | Refills: 0 | OUTPATIENT
Start: 2023-12-28

## 2023-12-28 RX ORDER — TIZANIDINE 4 MG/1
2 TABLET ORAL NIGHTLY
COMMUNITY
Start: 2023-12-28 | End: 2023-12-28 | Stop reason: SDUPTHER

## 2024-01-02 RX ORDER — TELMISARTAN 80 MG/1
80 TABLET ORAL DAILY
COMMUNITY
Start: 2024-01-02

## 2024-01-02 RX ORDER — TELMISARTAN 80 MG/1
TABLET ORAL
Qty: 90 TABLET | Refills: 0 | Status: SHIPPED | OUTPATIENT
Start: 2024-01-02

## 2024-01-02 RX ORDER — OMEPRAZOLE 20 MG/1
CAPSULE, DELAYED RELEASE ORAL
Qty: 90 CAPSULE | Refills: 0 | Status: SHIPPED | OUTPATIENT
Start: 2024-01-02

## 2024-01-02 RX ORDER — HYDROCHLOROTHIAZIDE 25 MG/1
TABLET ORAL
Qty: 90 TABLET | Refills: 0 | Status: SHIPPED | OUTPATIENT
Start: 2024-01-02

## 2024-01-02 RX ORDER — DULOXETIN HYDROCHLORIDE 60 MG/1
CAPSULE, DELAYED RELEASE ORAL
Qty: 90 CAPSULE | Refills: 0 | Status: SHIPPED | OUTPATIENT
Start: 2024-01-02

## 2024-01-02 RX ORDER — HYDROCHLOROTHIAZIDE 25 MG/1
25 TABLET ORAL DAILY
COMMUNITY
Start: 2024-01-02

## 2024-01-02 RX ORDER — OMEPRAZOLE 20 MG/1
20 CAPSULE, DELAYED RELEASE ORAL DAILY
COMMUNITY
Start: 2024-01-02

## 2024-01-02 RX ORDER — DULOXETIN HYDROCHLORIDE 60 MG/1
60 CAPSULE, DELAYED RELEASE ORAL DAILY
COMMUNITY
Start: 2024-01-02

## 2024-01-10 RX ORDER — TELMISARTAN 80 MG/1
80 TABLET ORAL DAILY
COMMUNITY
End: 2024-01-10 | Stop reason: SDUPTHER

## 2024-01-10 RX ORDER — TELMISARTAN 80 MG/1
80 TABLET ORAL DAILY
Qty: 90 TABLET | Refills: 0 | Status: CANCELLED | OUTPATIENT
Start: 2024-01-10

## 2024-01-10 RX ORDER — OMEPRAZOLE 20 MG/1
20 CAPSULE, DELAYED RELEASE ORAL DAILY
Qty: 90 CAPSULE | Refills: 0 | Status: SHIPPED | OUTPATIENT
Start: 2024-01-10

## 2024-01-10 RX ORDER — OMEPRAZOLE 20 MG/1
20 CAPSULE, DELAYED RELEASE ORAL DAILY
Qty: 30 CAPSULE | Status: CANCELLED | OUTPATIENT
Start: 2024-01-10

## 2024-01-10 RX ORDER — TELMISARTAN 80 MG/1
80 TABLET ORAL DAILY
Qty: 90 TABLET | Refills: 0 | Status: SHIPPED | OUTPATIENT
Start: 2024-01-10

## 2024-01-10 RX ORDER — OMEPRAZOLE 20 MG/1
20 CAPSULE, DELAYED RELEASE ORAL DAILY
COMMUNITY
End: 2024-01-10 | Stop reason: SDUPTHER

## 2024-01-15 RX ORDER — DULOXETIN HYDROCHLORIDE 60 MG/1
60 CAPSULE, DELAYED RELEASE ORAL DAILY
Qty: 90 CAPSULE | Refills: 0 | Status: SHIPPED | OUTPATIENT
Start: 2024-01-15

## 2024-01-25 ENCOUNTER — OFFICE VISIT (OUTPATIENT)
Dept: PRIMARY CARE CLINIC | Age: 74
End: 2024-01-25

## 2024-01-25 VITALS
HEART RATE: 64 BPM | SYSTOLIC BLOOD PRESSURE: 128 MMHG | BODY MASS INDEX: 34.7 KG/M2 | DIASTOLIC BLOOD PRESSURE: 78 MMHG | WEIGHT: 285 LBS | OXYGEN SATURATION: 94 % | TEMPERATURE: 97.8 F | HEIGHT: 76 IN

## 2024-01-25 DIAGNOSIS — J01.10 ACUTE NON-RECURRENT FRONTAL SINUSITIS: ICD-10-CM

## 2024-01-25 DIAGNOSIS — J34.89 SINUS PRESSURE: Primary | ICD-10-CM

## 2024-01-25 DIAGNOSIS — R05.1 ACUTE COUGH: ICD-10-CM

## 2024-01-25 RX ORDER — METOPROLOL SUCCINATE 100 MG/1
TABLET, EXTENDED RELEASE ORAL
Qty: 135 TABLET | Refills: 0 | Status: SHIPPED | OUTPATIENT
Start: 2024-01-25

## 2024-01-25 RX ORDER — TRAZODONE HYDROCHLORIDE 100 MG/1
TABLET ORAL
Qty: 270 TABLET | Refills: 0 | Status: SHIPPED | OUTPATIENT
Start: 2024-01-25

## 2024-01-25 RX ORDER — AZITHROMYCIN 250 MG/1
250 TABLET, FILM COATED ORAL SEE ADMIN INSTRUCTIONS
Qty: 6 TABLET | Refills: 0 | Status: SHIPPED | OUTPATIENT
Start: 2024-01-25 | End: 2024-01-30

## 2024-01-25 RX ORDER — METHYLPREDNISOLONE 4 MG/1
TABLET ORAL
Qty: 21 TABLET | Refills: 0 | Status: SHIPPED | OUTPATIENT
Start: 2024-01-25

## 2024-01-25 ASSESSMENT — ENCOUNTER SYMPTOMS
SINUS COMPLAINT: 1
HOARSE VOICE: 1
EYES NEGATIVE: 1
SINUS PAIN: 1
ALLERGIC/IMMUNOLOGIC NEGATIVE: 1
GASTROINTESTINAL NEGATIVE: 1
COUGH: 1
SORE THROAT: 1
SWOLLEN GLANDS: 1
SINUS PRESSURE: 1

## 2024-01-25 NOTE — PROGRESS NOTES
Bernabe Agosto (:  1950) is a 73 y.o. male,Established patient, here for evaluation of the following chief complaint(s):  Cough (Patient presents today with 6 day history of symptoms. States he started out with a hoarse voice, then a sore throat and now a bad cough. States he has a lot of mucous production. Yellow in color. Aches and pains in his chest from coughing. Has been taking Mucinex D as well as advil for symptoms. ), Pharyngitis, and Hoarse    Bernabe is a patient of Dr. Mackenzie, here today with complaints of a week long history of symptoms. He started with a raspy voice, then woke up with a really sore throat.  Sore throat has improved, but now has a productive cough. Also complains of a headache, sinus pressure, otalgia.  Denies chills, diaphoresis, fever.        ASSESSMENT/PLAN:  1. Sinus pressure  -     COVID-19  2. Acute non-recurrent frontal sinusitis  -     azithromycin (ZITHROMAX) 250 MG tablet; Take 1 tablet by mouth See Admin Instructions for 5 days 500mg on day 1 followed by 250mg on days 2 - 5, Disp-6 tablet, R-0Normal  -     methylPREDNISolone (MEDROL, MARGARETTE,) 4 MG tablet; Take by mouth. Take 6 tabs on day 1, 5 tabs on day 2, 4 tabs on day 3, 3 tabs on day 4, 2 tabs on day 5, 1 tab on day 6, Disp-21 tablet, R-0Normal  3. Acute cough  -     COVID-19    - Low concern for strep and flu.   - Covid swab sent. Will call with results.   - Due to length of symptoms, will go ahead and treat with steroids and antibiotics.  Instructed on use.   - Discussed conservative and symptomatic management.  Increase fluid intake.   - Patient education added to AVS.     Return if symptoms worsen or fail to improve.     Subjective   SUBJECTIVE/OBJECTIVE:  Sinus Problem  This is a new problem. The current episode started 1 to 4 weeks ago. The problem has been gradually worsening since onset. There has been no fever. The pain is moderate. Associated symptoms include congestion, coughing, ear pain, headaches, a hoarse

## 2024-01-26 LAB — SARS-COV-2 RNA RESP QL NAA+PROBE: DETECTED

## 2024-01-26 RX ORDER — FUROSEMIDE 40 MG/1
40 TABLET ORAL DAILY
Qty: 90 TABLET | Refills: 0 | Status: SHIPPED | OUTPATIENT
Start: 2024-01-26

## 2024-01-26 RX ORDER — FUROSEMIDE 40 MG/1
40 TABLET ORAL DAILY
COMMUNITY
End: 2024-01-26 | Stop reason: SDUPTHER

## 2024-01-29 RX ORDER — HYDROCHLOROTHIAZIDE 25 MG/1
25 TABLET ORAL DAILY
Qty: 90 TABLET | Refills: 0 | OUTPATIENT
Start: 2024-01-29

## 2024-02-05 ENCOUNTER — TELEPHONE (OUTPATIENT)
Dept: FAMILY MEDICINE CLINIC | Age: 74
End: 2024-02-05

## 2024-02-05 RX ORDER — HYDROCHLOROTHIAZIDE 25 MG/1
25 TABLET ORAL DAILY
COMMUNITY
End: 2024-02-05 | Stop reason: SDUPTHER

## 2024-02-05 RX ORDER — HYDROCHLOROTHIAZIDE 25 MG/1
25 TABLET ORAL DAILY
Qty: 90 TABLET | Refills: 0 | Status: SHIPPED | OUTPATIENT
Start: 2024-02-05 | End: 2024-02-08 | Stop reason: SDUPTHER

## 2024-02-05 SDOH — HEALTH STABILITY: PHYSICAL HEALTH: ON AVERAGE, HOW MANY DAYS PER WEEK DO YOU ENGAGE IN MODERATE TO STRENUOUS EXERCISE (LIKE A BRISK WALK)?: 0 DAYS

## 2024-02-05 ASSESSMENT — PATIENT HEALTH QUESTIONNAIRE - PHQ9
SUM OF ALL RESPONSES TO PHQ9 QUESTIONS 1 & 2: 0
SUM OF ALL RESPONSES TO PHQ QUESTIONS 1-9: 0
5. POOR APPETITE OR OVEREATING: 0
9. THOUGHTS THAT YOU WOULD BE BETTER OFF DEAD, OR OF HURTING YOURSELF: 0
SUM OF ALL RESPONSES TO PHQ QUESTIONS 1-9: 0
8. MOVING OR SPEAKING SO SLOWLY THAT OTHER PEOPLE COULD HAVE NOTICED. OR THE OPPOSITE, BEING SO FIGETY OR RESTLESS THAT YOU HAVE BEEN MOVING AROUND A LOT MORE THAN USUAL: 0
6. FEELING BAD ABOUT YOURSELF - OR THAT YOU ARE A FAILURE OR HAVE LET YOURSELF OR YOUR FAMILY DOWN: 0
SUM OF ALL RESPONSES TO PHQ QUESTIONS 1-9: 0
2. FEELING DOWN, DEPRESSED OR HOPELESS: 0
SUM OF ALL RESPONSES TO PHQ QUESTIONS 1-9: 0
1. LITTLE INTEREST OR PLEASURE IN DOING THINGS: 0
7. TROUBLE CONCENTRATING ON THINGS, SUCH AS READING THE NEWSPAPER OR WATCHING TELEVISION: 0
4. FEELING TIRED OR HAVING LITTLE ENERGY: 0
10. IF YOU CHECKED OFF ANY PROBLEMS, HOW DIFFICULT HAVE THESE PROBLEMS MADE IT FOR YOU TO DO YOUR WORK, TAKE CARE OF THINGS AT HOME, OR GET ALONG WITH OTHER PEOPLE: 0
3. TROUBLE FALLING OR STAYING ASLEEP: 0

## 2024-02-05 ASSESSMENT — LIFESTYLE VARIABLES
HOW OFTEN DO YOU HAVE A DRINK CONTAINING ALCOHOL: 4 OR MORE TIMES A WEEK
HAVE YOU OR SOMEONE ELSE BEEN INJURED AS A RESULT OF YOUR DRINKING: 0
HOW OFTEN DO YOU HAVE A DRINK CONTAINING ALCOHOL: 5
HOW OFTEN DURING THE LAST YEAR HAVE YOU HAD A FEELING OF GUILT OR REMORSE AFTER DRINKING: NEVER
HAS A RELATIVE, FRIEND, DOCTOR, OR ANOTHER HEALTH PROFESSIONAL EXPRESSED CONCERN ABOUT YOUR DRINKING OR SUGGESTED YOU CUT DOWN: 2
HOW OFTEN DURING THE LAST YEAR HAVE YOU FOUND THAT YOU WERE NOT ABLE TO STOP DRINKING ONCE YOU HAD STARTED: 0
HOW OFTEN DURING THE LAST YEAR HAVE YOU NEEDED AN ALCOHOLIC DRINK FIRST THING IN THE MORNING TO GET YOURSELF GOING AFTER A NIGHT OF HEAVY DRINKING: NEVER
HOW OFTEN DURING THE LAST YEAR HAVE YOU FOUND THAT YOU WERE NOT ABLE TO STOP DRINKING ONCE YOU HAD STARTED: NEVER
HOW OFTEN DURING THE LAST YEAR HAVE YOU BEEN UNABLE TO REMEMBER WHAT HAPPENED THE NIGHT BEFORE BECAUSE YOU HAD BEEN DRINKING: 0
HOW MANY STANDARD DRINKS CONTAINING ALCOHOL DO YOU HAVE ON A TYPICAL DAY: 1 OR 2
HOW OFTEN DURING THE LAST YEAR HAVE YOU FAILED TO DO WHAT WAS NORMALLY EXPECTED FROM YOU BECAUSE OF DRINKING: 0
HAS A RELATIVE, FRIEND, DOCTOR, OR ANOTHER HEALTH PROFESSIONAL EXPRESSED CONCERN ABOUT YOUR DRINKING OR SUGGESTED YOU CUT DOWN: YES, BUT NOT IN THE PAST YEAR
HOW OFTEN DURING THE LAST YEAR HAVE YOU NEEDED AN ALCOHOLIC DRINK FIRST THING IN THE MORNING TO GET YOURSELF GOING AFTER A NIGHT OF HEAVY DRINKING: 0
HOW MANY STANDARD DRINKS CONTAINING ALCOHOL DO YOU HAVE ON A TYPICAL DAY: 1
HOW OFTEN DO YOU HAVE SIX OR MORE DRINKS ON ONE OCCASION: 1
HOW OFTEN DURING THE LAST YEAR HAVE YOU HAD A FEELING OF GUILT OR REMORSE AFTER DRINKING: 0
HAVE YOU OR SOMEONE ELSE BEEN INJURED AS A RESULT OF YOUR DRINKING: NO
HOW OFTEN DURING THE LAST YEAR HAVE YOU BEEN UNABLE TO REMEMBER WHAT HAPPENED THE NIGHT BEFORE BECAUSE YOU HAD BEEN DRINKING: NEVER
HOW OFTEN DURING THE LAST YEAR HAVE YOU FAILED TO DO WHAT WAS NORMALLY EXPECTED FROM YOU BECAUSE OF DRINKING: NEVER

## 2024-02-08 ENCOUNTER — OFFICE VISIT (OUTPATIENT)
Dept: FAMILY MEDICINE CLINIC | Age: 74
End: 2024-02-08

## 2024-02-08 VITALS
HEART RATE: 62 BPM | DIASTOLIC BLOOD PRESSURE: 80 MMHG | HEIGHT: 76 IN | WEIGHT: 282 LBS | BODY MASS INDEX: 34.34 KG/M2 | OXYGEN SATURATION: 96 % | SYSTOLIC BLOOD PRESSURE: 120 MMHG

## 2024-02-08 DIAGNOSIS — G62.9 PERIPHERAL POLYNEUROPATHY: ICD-10-CM

## 2024-02-08 DIAGNOSIS — E78.00 HYPERCHOLESTEREMIA: ICD-10-CM

## 2024-02-08 DIAGNOSIS — Z12.5 SCREENING FOR PROSTATE CANCER: ICD-10-CM

## 2024-02-08 DIAGNOSIS — M46.96 UNSPECIFIED INFLAMMATORY SPONDYLOPATHY, LUMBAR REGION (HCC): ICD-10-CM

## 2024-02-08 DIAGNOSIS — N32.81 OAB (OVERACTIVE BLADDER): ICD-10-CM

## 2024-02-08 DIAGNOSIS — Z00.00 MEDICARE ANNUAL WELLNESS VISIT, SUBSEQUENT: Primary | ICD-10-CM

## 2024-02-08 DIAGNOSIS — K21.9 GASTROESOPHAGEAL REFLUX DISEASE, UNSPECIFIED WHETHER ESOPHAGITIS PRESENT: ICD-10-CM

## 2024-02-08 DIAGNOSIS — F33.40 RECURRENT MAJOR DEPRESSIVE DISORDER, IN REMISSION (HCC): ICD-10-CM

## 2024-02-08 DIAGNOSIS — M51.37 DEGENERATION OF LUMBAR OR LUMBOSACRAL INTERVERTEBRAL DISC: ICD-10-CM

## 2024-02-08 DIAGNOSIS — C61 PROSTATE CANCER (HCC): ICD-10-CM

## 2024-02-08 DIAGNOSIS — I10 PRIMARY HYPERTENSION: ICD-10-CM

## 2024-02-08 LAB
ALBUMIN SERPL-MCNC: 4.5 G/DL (ref 3.4–5)
ALBUMIN/GLOB SERPL: 2.6 {RATIO} (ref 1.1–2.2)
ALP SERPL-CCNC: 92 U/L (ref 40–129)
ALT SERPL-CCNC: 21 U/L (ref 10–40)
ANION GAP SERPL CALCULATED.3IONS-SCNC: 12 MMOL/L (ref 3–16)
AST SERPL-CCNC: 18 U/L (ref 15–37)
BILIRUB SERPL-MCNC: 0.7 MG/DL (ref 0–1)
BUN SERPL-MCNC: 24 MG/DL (ref 7–20)
CALCIUM SERPL-MCNC: 8.8 MG/DL (ref 8.3–10.6)
CHLORIDE SERPL-SCNC: 100 MMOL/L (ref 99–110)
CHOLEST SERPL-MCNC: 202 MG/DL (ref 0–199)
CO2 SERPL-SCNC: 28 MMOL/L (ref 21–32)
CREAT SERPL-MCNC: 1 MG/DL (ref 0.8–1.3)
GFR SERPLBLD CREATININE-BSD FMLA CKD-EPI: >60 ML/MIN/{1.73_M2}
GLUCOSE SERPL-MCNC: 114 MG/DL (ref 70–99)
HDLC SERPL-MCNC: 48 MG/DL (ref 40–60)
LDLC SERPL CALC-MCNC: 127 MG/DL
POTASSIUM SERPL-SCNC: 4.3 MMOL/L (ref 3.5–5.1)
PROT SERPL-MCNC: 6.2 G/DL (ref 6.4–8.2)
PSA SERPL DL<=0.01 NG/ML-MCNC: 0.27 NG/ML (ref 0–4)
SODIUM SERPL-SCNC: 140 MMOL/L (ref 136–145)
TRIGL SERPL-MCNC: 136 MG/DL (ref 0–150)
VLDLC SERPL CALC-MCNC: 27 MG/DL

## 2024-02-08 RX ORDER — HYDROCHLOROTHIAZIDE 25 MG/1
25 TABLET ORAL DAILY
Qty: 90 TABLET | Refills: 1 | Status: SHIPPED | OUTPATIENT
Start: 2024-02-08

## 2024-02-08 RX ORDER — POTASSIUM CHLORIDE 750 MG/1
10 TABLET, FILM COATED, EXTENDED RELEASE ORAL DAILY
Qty: 90 TABLET | Refills: 1 | Status: SHIPPED | OUTPATIENT
Start: 2024-02-08

## 2024-02-08 RX ORDER — TIZANIDINE 4 MG/1
8 TABLET ORAL NIGHTLY
Qty: 180 TABLET | Refills: 1 | Status: SHIPPED | OUTPATIENT
Start: 2024-02-08

## 2024-02-08 RX ORDER — OXYBUTYNIN CHLORIDE 10 MG/1
10 TABLET, EXTENDED RELEASE ORAL DAILY
Qty: 90 TABLET | Refills: 1 | Status: SHIPPED | OUTPATIENT
Start: 2024-02-08

## 2024-02-08 RX ORDER — TRAZODONE HYDROCHLORIDE 100 MG/1
TABLET ORAL
Qty: 270 TABLET | Refills: 1 | Status: SHIPPED | OUTPATIENT
Start: 2024-02-08

## 2024-02-08 RX ORDER — FINASTERIDE 5 MG/1
5 TABLET, FILM COATED ORAL DAILY
Qty: 90 TABLET | Refills: 1 | Status: SHIPPED | OUTPATIENT
Start: 2024-02-08

## 2024-02-08 RX ORDER — GABAPENTIN 100 MG/1
CAPSULE ORAL
Qty: 210 CAPSULE | Refills: 0 | Status: SHIPPED | OUTPATIENT
Start: 2024-02-08 | End: 2024-03-09

## 2024-02-08 RX ORDER — TAMSULOSIN HYDROCHLORIDE 0.4 MG/1
0.4 CAPSULE ORAL DAILY
Qty: 90 CAPSULE | Refills: 1 | Status: SHIPPED | OUTPATIENT
Start: 2024-02-08

## 2024-02-08 RX ORDER — ATORVASTATIN CALCIUM 80 MG/1
80 TABLET, FILM COATED ORAL DAILY
Qty: 90 TABLET | Refills: 1 | Status: SHIPPED | OUTPATIENT
Start: 2024-02-08

## 2024-02-08 RX ORDER — METOPROLOL SUCCINATE 100 MG/1
TABLET, EXTENDED RELEASE ORAL
Qty: 135 TABLET | Refills: 1 | Status: SHIPPED | OUTPATIENT
Start: 2024-02-08

## 2024-02-08 RX ORDER — PREGABALIN 50 MG/1
CAPSULE ORAL
Qty: 90 CAPSULE | Refills: 0 | Status: SHIPPED | OUTPATIENT
Start: 2024-02-08 | End: 2024-03-09

## 2024-02-08 RX ORDER — OMEPRAZOLE 20 MG/1
20 CAPSULE, DELAYED RELEASE ORAL DAILY
Qty: 90 CAPSULE | Refills: 1 | Status: SHIPPED | OUTPATIENT
Start: 2024-02-08

## 2024-02-08 RX ORDER — FUROSEMIDE 40 MG/1
40 TABLET ORAL DAILY
Qty: 90 TABLET | Refills: 1 | Status: SHIPPED | OUTPATIENT
Start: 2024-02-08

## 2024-02-08 RX ORDER — TELMISARTAN 80 MG/1
80 TABLET ORAL DAILY
Qty: 90 TABLET | Refills: 1 | Status: SHIPPED | OUTPATIENT
Start: 2024-02-08

## 2024-02-08 RX ORDER — DULOXETIN HYDROCHLORIDE 60 MG/1
60 CAPSULE, DELAYED RELEASE ORAL DAILY
Qty: 90 CAPSULE | Refills: 1 | Status: SHIPPED | OUTPATIENT
Start: 2024-02-08

## 2024-02-08 NOTE — PATIENT INSTRUCTIONS
Emotional Support.\"  Current as of: June 25, 2023               Content Version: 13.9  © 8203-9669 [employer].   Care instructions adapted under license by [employer]. If you have questions about a medical condition or this instruction, always ask your healthcare professional. [employer] disclaims any warranty or liability for your use of this information.           Starting a Weight Loss Plan: Care Instructions  Overview     If you're thinking about losing weight, it can be hard to know where to start. Your doctor can help you set up a weight loss plan that best meets your needs. You may want to take a class on nutrition or exercise, or you could join a weight loss support group. If you have questions about how to make changes to your eating or exercise habits, ask your doctor about seeing a registered dietitian or an exercise specialist.  It can be a big challenge to lose weight. But you don't have to make huge changes at once. Make small changes, and stick with them. When those changes become habit, add a few more changes.  If you don't think you're ready to make changes right now, try to pick a date in the future. Make an appointment to see your doctor to discuss whether the time is right for you to start a plan.  Follow-up care is a key part of your treatment and safety. Be sure to make and go to all appointments, and call your doctor if you are having problems. It's also a good idea to know your test results and keep a list of the medicines you take.  How can you care for yourself at home?  Set realistic goals. Many people expect to lose much more weight than is likely. A weight loss of 5% to 10% of your body weight may be enough to improve your health.  Get family and friends involved to provide support. Talk to them about why you are trying to lose weight, and ask them to help. They can help by participating in exercise and having meals with you, even if they may be eating

## 2024-02-08 NOTE — PROGRESS NOTES
Medicare Annual Wellness Visit    Bernabe Agosto is here for Medicare AWV (MEDICARE ANNUAL WELLNESS VISIT)    Assessment & Plan   Medicare annual wellness visit, subsequent  -Healthcare topics addressed as below  -Fasting labs  -Encouraged to get vaccines at the local pharmacy  -Continue to follow with specialties  -Changing gabapentin to Lyrica  -Follow-up in 6 months, or sooner if needed  Peripheral polyneuropathy  -Uncontrolled.  Discussed options.  Upon chart review, it looks like the patient may have tried Lyrica in 2013, but he does not recall doing this.  Would like to try to switch to Lyrica to see if it helps.  Sending low-dose gabapentin and pregabalin to assist with ease of transition.  Decrease gabapentin at weekly intervals to 600 mg, 300 mg, and 100 mg prior to discontinuation.  Start Lyrica at 50 mg twice daily and increase at weekly intervals to 100 mg twice daily and 150 mg twice daily.  Follow-up in 6 months, or sooner if needed.  -     gabapentin (NEURONTIN) 100 MG capsule; Take 6 capsules 3 times daily for 1 week, then 3 capsules 3 times daily for 1 week, then 1 capsule three times daily for 1 week, then stop, Disp-210 capsule, R-0Normal  -     pregabalin (LYRICA) 50 MG capsule; Take 1 capsule twice daily for 1 week, then increase to 2 capsules twice daily for 1 week, then increase to 3 capsules twice daily going forward., Disp-90 capsule, R-0Normal  -     DULoxetine (CYMBALTA) 60 MG extended release capsule; Take 1 capsule by mouth daily, Disp-90 capsule, R-1Normal  Degeneration of lumbar or lumbosacral intervertebral disc  -Has had multiple procedures in the past including epidural injection and ablation.  Continue with duloxetine and tizanidine.  Changing neuropathy regimen as above.  Follow-up in 6 months.  -     DULoxetine (CYMBALTA) 60 MG extended release capsule; Take 1 capsule by mouth daily, Disp-90 capsule, R-1Normal  -     tiZANidine (ZANAFLEX) 4 MG tablet; Take 2 tablets by mouth at

## 2024-02-24 DIAGNOSIS — C61 PROSTATE CANCER (HCC): ICD-10-CM

## 2024-02-26 RX ORDER — TAMSULOSIN HYDROCHLORIDE 0.4 MG/1
0.4 CAPSULE ORAL DAILY
Qty: 90 CAPSULE | Refills: 1 | OUTPATIENT
Start: 2024-02-26

## 2024-03-04 DIAGNOSIS — G62.9 PERIPHERAL POLYNEUROPATHY: ICD-10-CM

## 2024-03-04 DIAGNOSIS — C61 PROSTATE CANCER (HCC): ICD-10-CM

## 2024-03-04 DIAGNOSIS — N32.81 OAB (OVERACTIVE BLADDER): ICD-10-CM

## 2024-03-04 RX ORDER — OXYBUTYNIN CHLORIDE 10 MG/1
10 TABLET, EXTENDED RELEASE ORAL DAILY
Qty: 90 TABLET | Refills: 1 | OUTPATIENT
Start: 2024-03-04

## 2024-03-04 RX ORDER — PREGABALIN 50 MG/1
CAPSULE ORAL
Qty: 90 CAPSULE | Refills: 0 | Status: CANCELLED | OUTPATIENT
Start: 2024-03-04 | End: 2024-04-03

## 2024-03-04 RX ORDER — TAMSULOSIN HYDROCHLORIDE 0.4 MG/1
0.4 CAPSULE ORAL DAILY
Qty: 90 CAPSULE | Refills: 1 | OUTPATIENT
Start: 2024-03-04

## 2024-03-05 RX ORDER — PREGABALIN 150 MG/1
150 CAPSULE ORAL 2 TIMES DAILY
Qty: 180 CAPSULE | Refills: 1 | Status: SHIPPED | OUTPATIENT
Start: 2024-03-05 | End: 2024-09-01

## 2024-04-09 DIAGNOSIS — I10 PRIMARY HYPERTENSION: ICD-10-CM

## 2024-04-09 DIAGNOSIS — K21.9 GASTROESOPHAGEAL REFLUX DISEASE, UNSPECIFIED WHETHER ESOPHAGITIS PRESENT: ICD-10-CM

## 2024-04-09 RX ORDER — OMEPRAZOLE 20 MG/1
20 CAPSULE, DELAYED RELEASE ORAL DAILY
Qty: 90 CAPSULE | Refills: 1 | OUTPATIENT
Start: 2024-04-09

## 2024-04-09 RX ORDER — TELMISARTAN 80 MG/1
80 TABLET ORAL DAILY
Qty: 90 TABLET | Refills: 1 | OUTPATIENT
Start: 2024-04-09

## 2024-06-03 ENCOUNTER — TELEPHONE (OUTPATIENT)
Dept: FAMILY MEDICINE CLINIC | Age: 74
End: 2024-06-03

## 2024-06-03 NOTE — TELEPHONE ENCOUNTER
Pt handicap placard is expiring.  Pt wants to know if he can get the 5 year again?    Please call when ready for

## 2024-07-05 DIAGNOSIS — M51.37 DEGENERATION OF LUMBAR OR LUMBOSACRAL INTERVERTEBRAL DISC: ICD-10-CM

## 2024-07-05 DIAGNOSIS — C61 PROSTATE CANCER (HCC): ICD-10-CM

## 2024-07-05 DIAGNOSIS — E78.00 HYPERCHOLESTEREMIA: ICD-10-CM

## 2024-07-05 RX ORDER — FINASTERIDE 5 MG/1
5 TABLET, FILM COATED ORAL DAILY
Qty: 90 TABLET | Refills: 1 | Status: SHIPPED | OUTPATIENT
Start: 2024-07-05

## 2024-07-05 RX ORDER — TRAZODONE HYDROCHLORIDE 100 MG/1
TABLET ORAL
Qty: 270 TABLET | Refills: 1 | Status: SHIPPED | OUTPATIENT
Start: 2024-07-05

## 2024-07-05 RX ORDER — TIZANIDINE 4 MG/1
8 TABLET ORAL NIGHTLY
Qty: 180 TABLET | Refills: 1 | Status: SHIPPED | OUTPATIENT
Start: 2024-07-05

## 2024-07-05 RX ORDER — ATORVASTATIN CALCIUM 80 MG/1
80 TABLET, FILM COATED ORAL DAILY
Qty: 90 TABLET | Refills: 1 | Status: SHIPPED | OUTPATIENT
Start: 2024-07-05

## 2024-08-01 DIAGNOSIS — G62.9 PERIPHERAL POLYNEUROPATHY: ICD-10-CM

## 2024-08-01 DIAGNOSIS — M51.37 DEGENERATION OF LUMBAR OR LUMBOSACRAL INTERVERTEBRAL DISC: ICD-10-CM

## 2024-08-01 DIAGNOSIS — I10 PRIMARY HYPERTENSION: ICD-10-CM

## 2024-08-01 RX ORDER — TELMISARTAN 80 MG/1
80 TABLET ORAL DAILY
Qty: 90 TABLET | Refills: 1 | Status: SHIPPED | OUTPATIENT
Start: 2024-08-01

## 2024-08-01 RX ORDER — DULOXETIN HYDROCHLORIDE 60 MG/1
CAPSULE, DELAYED RELEASE ORAL DAILY
Qty: 90 CAPSULE | Refills: 1 | Status: SHIPPED | OUTPATIENT
Start: 2024-08-01

## 2024-08-02 ENCOUNTER — PATIENT MESSAGE (OUTPATIENT)
Dept: FAMILY MEDICINE CLINIC | Age: 74
End: 2024-08-02

## 2024-08-02 DIAGNOSIS — C61 PROSTATE CANCER (HCC): ICD-10-CM

## 2024-08-02 DIAGNOSIS — G62.9 PERIPHERAL POLYNEUROPATHY: ICD-10-CM

## 2024-08-02 RX ORDER — GABAPENTIN 100 MG/1
CAPSULE ORAL
Qty: 210 CAPSULE | Refills: 0 | Status: CANCELLED | OUTPATIENT
Start: 2024-08-02 | End: 2024-09-01

## 2024-08-02 RX ORDER — TAMSULOSIN HYDROCHLORIDE 0.4 MG/1
0.4 CAPSULE ORAL DAILY
Qty: 90 CAPSULE | Refills: 1 | Status: SHIPPED | OUTPATIENT
Start: 2024-08-02

## 2024-08-02 RX ORDER — GABAPENTIN 400 MG/1
CAPSULE ORAL
Qty: 90 CAPSULE | Refills: 0 | Status: SHIPPED | OUTPATIENT
Start: 2024-08-02 | End: 2024-09-01

## 2024-08-02 NOTE — TELEPHONE ENCOUNTER
From: Bernabe White  To: Dr. Jason Mackenzie  Sent: 8/2/2024 11:48 AM EDT  Subject: Gabapentin refill    Dr. Mackenzie,Please refill a prescription for Gabapentin 800 MG.I was taking that 3 times a day and was taken off of it and prescribed a different medication which didn't work.  Thank You,  Bernabe white

## 2024-08-10 DIAGNOSIS — I10 PRIMARY HYPERTENSION: ICD-10-CM

## 2024-08-12 RX ORDER — METOPROLOL SUCCINATE 100 MG/1
TABLET, EXTENDED RELEASE ORAL
Qty: 135 TABLET | Refills: 1 | Status: SHIPPED | OUTPATIENT
Start: 2024-08-12

## 2024-09-04 DIAGNOSIS — N32.81 OAB (OVERACTIVE BLADDER): ICD-10-CM

## 2024-09-04 RX ORDER — OXYBUTYNIN CHLORIDE 10 MG/1
10 TABLET, EXTENDED RELEASE ORAL DAILY
Qty: 90 TABLET | Refills: 1 | Status: SHIPPED | OUTPATIENT
Start: 2024-09-04

## 2024-09-18 ENCOUNTER — OFFICE VISIT (OUTPATIENT)
Dept: SLEEP MEDICINE | Age: 74
End: 2024-09-18
Payer: MEDICARE

## 2024-09-18 VITALS
SYSTOLIC BLOOD PRESSURE: 115 MMHG | RESPIRATION RATE: 18 BRPM | WEIGHT: 284.2 LBS | BODY MASS INDEX: 34.61 KG/M2 | DIASTOLIC BLOOD PRESSURE: 70 MMHG | OXYGEN SATURATION: 97 % | HEIGHT: 76 IN | HEART RATE: 78 BPM | TEMPERATURE: 97.9 F

## 2024-09-18 DIAGNOSIS — Z99.89 DEPENDENCE ON OTHER ENABLING MACHINES AND DEVICES: ICD-10-CM

## 2024-09-18 DIAGNOSIS — E66.9 OBESITY (BMI 30.0-34.9): ICD-10-CM

## 2024-09-18 DIAGNOSIS — I10 PRIMARY HYPERTENSION: ICD-10-CM

## 2024-09-18 DIAGNOSIS — G47.33 OSA ON CPAP: Primary | ICD-10-CM

## 2024-09-18 PROCEDURE — G8417 CALC BMI ABV UP PARAM F/U: HCPCS | Performed by: PSYCHIATRY & NEUROLOGY

## 2024-09-18 PROCEDURE — 3078F DIAST BP <80 MM HG: CPT | Performed by: PSYCHIATRY & NEUROLOGY

## 2024-09-18 PROCEDURE — 3074F SYST BP LT 130 MM HG: CPT | Performed by: PSYCHIATRY & NEUROLOGY

## 2024-09-18 PROCEDURE — G8427 DOCREV CUR MEDS BY ELIG CLIN: HCPCS | Performed by: PSYCHIATRY & NEUROLOGY

## 2024-09-18 PROCEDURE — 99214 OFFICE O/P EST MOD 30 MIN: CPT | Performed by: PSYCHIATRY & NEUROLOGY

## 2024-09-18 PROCEDURE — 3017F COLORECTAL CA SCREEN DOC REV: CPT | Performed by: PSYCHIATRY & NEUROLOGY

## 2024-09-18 PROCEDURE — 1123F ACP DISCUSS/DSCN MKR DOCD: CPT | Performed by: PSYCHIATRY & NEUROLOGY

## 2024-09-18 PROCEDURE — 1036F TOBACCO NON-USER: CPT | Performed by: PSYCHIATRY & NEUROLOGY

## 2024-09-18 ASSESSMENT — SLEEP AND FATIGUE QUESTIONNAIRES
HOW LIKELY ARE YOU TO NOD OFF OR FALL ASLEEP WHILE SITTING AND READING: SLIGHT CHANCE OF DOZING
HOW LIKELY ARE YOU TO NOD OFF OR FALL ASLEEP WHEN YOU ARE A PASSENGER IN A CAR FOR AN HOUR WITHOUT A BREAK: SLIGHT CHANCE OF DOZING
ESS TOTAL SCORE: 3
HOW LIKELY ARE YOU TO NOD OFF OR FALL ASLEEP WHILE SITTING QUIETLY AFTER LUNCH WITHOUT ALCOHOL: WOULD NEVER DOZE
HOW LIKELY ARE YOU TO NOD OFF OR FALL ASLEEP WHILE SITTING AND TALKING TO SOMEONE: WOULD NEVER DOZE
HOW LIKELY ARE YOU TO NOD OFF OR FALL ASLEEP WHILE SITTING INACTIVE IN A PUBLIC PLACE: WOULD NEVER DOZE
HOW LIKELY ARE YOU TO NOD OFF OR FALL ASLEEP IN A CAR, WHILE STOPPED FOR A FEW MINUTES IN TRAFFIC: WOULD NEVER DOZE
HOW LIKELY ARE YOU TO NOD OFF OR FALL ASLEEP WHILE LYING DOWN TO REST IN THE AFTERNOON WHEN CIRCUMSTANCES PERMIT: WOULD NEVER DOZE
HOW LIKELY ARE YOU TO NOD OFF OR FALL ASLEEP WHILE WATCHING TV: SLIGHT CHANCE OF DOZING

## 2024-10-01 DIAGNOSIS — K21.9 GASTROESOPHAGEAL REFLUX DISEASE, UNSPECIFIED WHETHER ESOPHAGITIS PRESENT: ICD-10-CM

## 2024-10-01 DIAGNOSIS — I10 PRIMARY HYPERTENSION: ICD-10-CM

## 2024-10-01 RX ORDER — HYDROCHLOROTHIAZIDE 25 MG/1
25 TABLET ORAL DAILY
Qty: 90 TABLET | Refills: 1 | Status: SHIPPED | OUTPATIENT
Start: 2024-10-01

## 2024-10-29 DIAGNOSIS — I10 PRIMARY HYPERTENSION: ICD-10-CM

## 2024-10-29 RX ORDER — POTASSIUM CHLORIDE 750 MG/1
10 TABLET, EXTENDED RELEASE ORAL DAILY
Qty: 90 TABLET | Refills: 0 | Status: SHIPPED | OUTPATIENT
Start: 2024-10-29

## 2024-10-29 RX ORDER — FUROSEMIDE 40 MG/1
40 TABLET ORAL DAILY
Qty: 90 TABLET | Refills: 0 | Status: SHIPPED | OUTPATIENT
Start: 2024-10-29

## 2025-01-10 DIAGNOSIS — M51.379 DEGENERATION OF LUMBAR OR LUMBOSACRAL INTERVERTEBRAL DISC: ICD-10-CM

## 2025-01-10 DIAGNOSIS — E78.00 HYPERCHOLESTEREMIA: ICD-10-CM

## 2025-01-10 DIAGNOSIS — K21.9 GASTROESOPHAGEAL REFLUX DISEASE, UNSPECIFIED WHETHER ESOPHAGITIS PRESENT: ICD-10-CM

## 2025-01-10 DIAGNOSIS — G62.9 PERIPHERAL POLYNEUROPATHY: ICD-10-CM

## 2025-01-10 DIAGNOSIS — I10 PRIMARY HYPERTENSION: ICD-10-CM

## 2025-01-10 RX ORDER — ATORVASTATIN CALCIUM 80 MG/1
80 TABLET, FILM COATED ORAL DAILY
Qty: 90 TABLET | Refills: 1 | Status: SHIPPED | OUTPATIENT
Start: 2025-01-10

## 2025-01-10 RX ORDER — DULOXETIN HYDROCHLORIDE 60 MG/1
CAPSULE, DELAYED RELEASE ORAL DAILY
Qty: 90 CAPSULE | Refills: 1 | Status: SHIPPED | OUTPATIENT
Start: 2025-01-10

## 2025-01-10 RX ORDER — TELMISARTAN 80 MG/1
80 TABLET ORAL DAILY
Qty: 90 TABLET | Refills: 1 | Status: SHIPPED | OUTPATIENT
Start: 2025-01-10

## 2025-01-10 RX ORDER — TRAZODONE HYDROCHLORIDE 100 MG/1
TABLET ORAL
Qty: 270 TABLET | Refills: 1 | Status: SHIPPED | OUTPATIENT
Start: 2025-01-10

## 2025-01-11 DIAGNOSIS — K21.9 GASTROESOPHAGEAL REFLUX DISEASE, UNSPECIFIED WHETHER ESOPHAGITIS PRESENT: ICD-10-CM

## 2025-02-05 ENCOUNTER — TELEPHONE (OUTPATIENT)
Dept: FAMILY MEDICINE CLINIC | Age: 75
End: 2025-02-05

## 2025-02-05 DIAGNOSIS — C61 PROSTATE CANCER (HCC): ICD-10-CM

## 2025-02-05 DIAGNOSIS — I10 PRIMARY HYPERTENSION: ICD-10-CM

## 2025-02-05 RX ORDER — FUROSEMIDE 40 MG/1
40 TABLET ORAL DAILY
Qty: 90 TABLET | Refills: 0 | Status: SHIPPED | OUTPATIENT
Start: 2025-02-05

## 2025-02-05 RX ORDER — POTASSIUM CHLORIDE 750 MG/1
10 TABLET, EXTENDED RELEASE ORAL DAILY
Qty: 90 TABLET | Refills: 0 | Status: SHIPPED | OUTPATIENT
Start: 2025-02-05

## 2025-02-05 RX ORDER — FINASTERIDE 5 MG/1
5 TABLET, FILM COATED ORAL DAILY
Qty: 90 TABLET | Refills: 0 | Status: SHIPPED | OUTPATIENT
Start: 2025-02-05

## 2025-02-10 ENCOUNTER — OFFICE VISIT (OUTPATIENT)
Dept: FAMILY MEDICINE CLINIC | Age: 75
End: 2025-02-10

## 2025-02-10 VITALS
WEIGHT: 273 LBS | HEART RATE: 70 BPM | DIASTOLIC BLOOD PRESSURE: 80 MMHG | SYSTOLIC BLOOD PRESSURE: 120 MMHG | OXYGEN SATURATION: 98 % | HEIGHT: 76 IN | BODY MASS INDEX: 33.24 KG/M2

## 2025-02-10 DIAGNOSIS — Z00.00 MEDICARE ANNUAL WELLNESS VISIT, SUBSEQUENT: Primary | ICD-10-CM

## 2025-02-10 DIAGNOSIS — C61 PROSTATE CANCER (HCC): ICD-10-CM

## 2025-02-10 DIAGNOSIS — G62.9 PERIPHERAL POLYNEUROPATHY: ICD-10-CM

## 2025-02-10 DIAGNOSIS — G56.32 RADIAL NERVE COMPRESSION, LEFT: ICD-10-CM

## 2025-02-10 DIAGNOSIS — F51.01 PRIMARY INSOMNIA: ICD-10-CM

## 2025-02-10 DIAGNOSIS — E78.00 HYPERCHOLESTEREMIA: ICD-10-CM

## 2025-02-10 DIAGNOSIS — N32.81 OAB (OVERACTIVE BLADDER): ICD-10-CM

## 2025-02-10 DIAGNOSIS — K21.9 GASTROESOPHAGEAL REFLUX DISEASE, UNSPECIFIED WHETHER ESOPHAGITIS PRESENT: ICD-10-CM

## 2025-02-10 DIAGNOSIS — I10 PRIMARY HYPERTENSION: ICD-10-CM

## 2025-02-10 DIAGNOSIS — N40.1 BENIGN PROSTATIC HYPERPLASIA WITH LOWER URINARY TRACT SYMPTOMS, SYMPTOM DETAILS UNSPECIFIED: ICD-10-CM

## 2025-02-10 DIAGNOSIS — Z12.5 SCREENING FOR PROSTATE CANCER: ICD-10-CM

## 2025-02-10 RX ORDER — METOPROLOL SUCCINATE 100 MG/1
TABLET, EXTENDED RELEASE ORAL
Qty: 135 TABLET | Refills: 1 | Status: SHIPPED | OUTPATIENT
Start: 2025-02-10

## 2025-02-10 RX ORDER — OXYBUTYNIN CHLORIDE 10 MG/1
10 TABLET, EXTENDED RELEASE ORAL DAILY
Qty: 90 TABLET | Refills: 1 | Status: SHIPPED | OUTPATIENT
Start: 2025-02-10

## 2025-02-10 RX ORDER — HYDROCHLOROTHIAZIDE 25 MG/1
25 TABLET ORAL DAILY
Qty: 90 TABLET | Refills: 1 | Status: SHIPPED | OUTPATIENT
Start: 2025-02-10

## 2025-02-10 RX ORDER — GABAPENTIN 400 MG/1
CAPSULE ORAL
Qty: 180 CAPSULE | Refills: 0 | Status: SHIPPED | OUTPATIENT
Start: 2025-02-10 | End: 2025-03-12

## 2025-02-10 RX ORDER — TAMSULOSIN HYDROCHLORIDE 0.4 MG/1
0.4 CAPSULE ORAL DAILY
Qty: 90 CAPSULE | Refills: 1 | Status: SHIPPED | OUTPATIENT
Start: 2025-02-10

## 2025-02-10 SDOH — ECONOMIC STABILITY: FOOD INSECURITY: WITHIN THE PAST 12 MONTHS, THE FOOD YOU BOUGHT JUST DIDN'T LAST AND YOU DIDN'T HAVE MONEY TO GET MORE.: NEVER TRUE

## 2025-02-10 SDOH — ECONOMIC STABILITY: FOOD INSECURITY: WITHIN THE PAST 12 MONTHS, YOU WORRIED THAT YOUR FOOD WOULD RUN OUT BEFORE YOU GOT MONEY TO BUY MORE.: NEVER TRUE

## 2025-02-10 ASSESSMENT — LIFESTYLE VARIABLES
HOW OFTEN DURING THE LAST YEAR HAVE YOU BEEN UNABLE TO REMEMBER WHAT HAPPENED THE NIGHT BEFORE BECAUSE YOU HAD BEEN DRINKING: NEVER
HAS A RELATIVE, FRIEND, DOCTOR, OR ANOTHER HEALTH PROFESSIONAL EXPRESSED CONCERN ABOUT YOUR DRINKING OR SUGGESTED YOU CUT DOWN: NO
HOW MANY STANDARD DRINKS CONTAINING ALCOHOL DO YOU HAVE ON A TYPICAL DAY: 3 OR 4
HOW OFTEN DO YOU HAVE A DRINK CONTAINING ALCOHOL: 4 OR MORE TIMES A WEEK
HAVE YOU OR SOMEONE ELSE BEEN INJURED AS A RESULT OF YOUR DRINKING: NO
HOW OFTEN DURING THE LAST YEAR HAVE YOU NEEDED AN ALCOHOLIC DRINK FIRST THING IN THE MORNING TO GET YOURSELF GOING AFTER A NIGHT OF HEAVY DRINKING: NEVER
HOW OFTEN DURING THE LAST YEAR HAVE YOU FAILED TO DO WHAT WAS NORMALLY EXPECTED FROM YOU BECAUSE OF DRINKING: NEVER
HOW OFTEN DURING THE LAST YEAR HAVE YOU HAD A FEELING OF GUILT OR REMORSE AFTER DRINKING: NEVER
HOW OFTEN DURING THE LAST YEAR HAVE YOU FOUND THAT YOU WERE NOT ABLE TO STOP DRINKING ONCE YOU HAD STARTED: NEVER

## 2025-02-10 ASSESSMENT — PATIENT HEALTH QUESTIONNAIRE - PHQ9
SUM OF ALL RESPONSES TO PHQ QUESTIONS 1-9: 0
SUM OF ALL RESPONSES TO PHQ QUESTIONS 1-9: 0
7. TROUBLE CONCENTRATING ON THINGS, SUCH AS READING THE NEWSPAPER OR WATCHING TELEVISION: NOT AT ALL
2. FEELING DOWN, DEPRESSED OR HOPELESS: NOT AT ALL
3. TROUBLE FALLING OR STAYING ASLEEP: NOT AT ALL
5. POOR APPETITE OR OVEREATING: NOT AT ALL
SUM OF ALL RESPONSES TO PHQ9 QUESTIONS 1 & 2: 0
SUM OF ALL RESPONSES TO PHQ QUESTIONS 1-9: 0
SUM OF ALL RESPONSES TO PHQ QUESTIONS 1-9: 0
9. THOUGHTS THAT YOU WOULD BE BETTER OFF DEAD, OR OF HURTING YOURSELF: NOT AT ALL
8. MOVING OR SPEAKING SO SLOWLY THAT OTHER PEOPLE COULD HAVE NOTICED. OR THE OPPOSITE, BEING SO FIGETY OR RESTLESS THAT YOU HAVE BEEN MOVING AROUND A LOT MORE THAN USUAL: NOT AT ALL
10. IF YOU CHECKED OFF ANY PROBLEMS, HOW DIFFICULT HAVE THESE PROBLEMS MADE IT FOR YOU TO DO YOUR WORK, TAKE CARE OF THINGS AT HOME, OR GET ALONG WITH OTHER PEOPLE: NOT DIFFICULT AT ALL
4. FEELING TIRED OR HAVING LITTLE ENERGY: NOT AT ALL
6. FEELING BAD ABOUT YOURSELF - OR THAT YOU ARE A FAILURE OR HAVE LET YOURSELF OR YOUR FAMILY DOWN: NOT AT ALL
1. LITTLE INTEREST OR PLEASURE IN DOING THINGS: NOT AT ALL

## 2025-02-10 NOTE — ASSESSMENT & PLAN NOTE
Controlled.  Continue oxybutynin.  Follow-up in 6 months.    Orders:    oxyBUTYnin (DITROPAN-XL) 10 MG extended release tablet; Take 1 tablet by mouth daily

## 2025-02-10 NOTE — ASSESSMENT & PLAN NOTE
Controlled.  Continue tamsulosin and finasteride.  Continue to follow with urology.  Follow-up in 6 months.

## 2025-02-10 NOTE — ASSESSMENT & PLAN NOTE
Chronic.  Uncontrolled.  Restart gabapentin.  Patient did not see improvement with Lyrica.  Increase by 1 capsule every 3 to 5 days up to 800 mg 3 times daily.  Follow-up in 6 months.    Orders:    gabapentin (NEURONTIN) 400 MG capsule; Take one a day for 3-5 days, then increase to 1 twice a day for 3-5 days, then increase to 1 three times a day, then increase to 2 in morning, 1 in the afternoon, and 1 in the evening, then increase to 2 in the morning, 2 in the afternoon, and 1 in the evening, then increase to 2 capsules three times daily going forward.    Comprehensive Metabolic Panel; Future

## 2025-02-10 NOTE — ASSESSMENT & PLAN NOTE
Controlled.  Continue metoprolol, telmisartan, and hydrochlorothiazide.  Fasting labs ordered.  Follow-up in 6 months.    Orders:    metoprolol succinate (TOPROL XL) 100 MG extended release tablet; TAKE 1 AND 1/2 TABLETS BY MOUTH DAILY    hydroCHLOROthiazide (HYDRODIURIL) 25 MG tablet; Take 1 tablet by mouth daily    Comprehensive Metabolic Panel; Future

## 2025-02-10 NOTE — ASSESSMENT & PLAN NOTE
Controlled.  Continue atorvastatin.  Fasting labs ordered to be completed at labs only appointment.  Follow-up in 6 months.    Orders:    Lipid Panel; Future

## 2025-02-10 NOTE — ASSESSMENT & PLAN NOTE
Controlled.  Follows with urology.  Continue with management by them.  Refill tamsulosin.  Follow-up in 6 months.    Orders:    tamsulosin (FLOMAX) 0.4 MG capsule; Take 1 capsule by mouth daily

## 2025-02-10 NOTE — PROGRESS NOTES
Would like to restart gabapentin if possible.  Had been on 800 mg 3 times daily.    -Having numbness to the thumb, index finger, and middle finger on the left hand.  Has been happening for some time but is getting worse.  Does affect  strength a little bit.  Did not know if this was neuropathy or something else.    -Not fasting for labs today but agreeable to coming back for a labs only appointment.  Will look into getting Tdap and RSV vaccines at the local pharmacy.  Mood has been good.  Continues her medications without issue.  Ambulates with a cane.  Typically has a drink with dinner.    Patient's complete Health Risk Assessment and screening values have been reviewed and are found in Flowsheets. The following problems were reviewed today and where indicated follow up appointments were made and/or referrals ordered.    Positive Risk Factor Screenings with Interventions:       Alcohol Screening:  AUDIT-C Score: 5  AUDIT Total Score: 5  Total Score Interpretation: 0-7 suggests low risk alcohol consumption but assess individual risks   Interventions:  Patient declined any further intervention or treatment                Abnormal BMI (obese):  Body mass index is 33.23 kg/m². (!) Abnormal  Interventions:  Low fat diet, exercise for at least 150 minutes/week           Safety:  Do you have any tripping hazards - loose or unsecured carpets or rugs?: (!) Yes  Interventions:  Home safety tips provided    ADL's:   Patient reports needing help with:  Select all that apply: (!) Laundry, Housekeeping  Interventions:  Patient declined any further interventions or treatment                  Objective   Vitals:    02/10/25 1039   BP: 120/80   Site: Right Upper Arm   Position: Sitting   Cuff Size: Medium Adult   Pulse: 70   SpO2: 98%   Weight: 123.8 kg (273 lb)   Height: 1.93 m (6' 4\")      Body mass index is 33.23 kg/m².        General Appearance: alert and oriented to person, place and time, well developed and well-

## 2025-02-18 ENCOUNTER — LAB (OUTPATIENT)
Dept: FAMILY MEDICINE CLINIC | Age: 75
End: 2025-02-18
Payer: MEDICARE

## 2025-02-18 DIAGNOSIS — G62.9 PERIPHERAL POLYNEUROPATHY: ICD-10-CM

## 2025-02-18 DIAGNOSIS — Z12.5 SCREENING FOR PROSTATE CANCER: ICD-10-CM

## 2025-02-18 DIAGNOSIS — E78.00 HYPERCHOLESTEREMIA: ICD-10-CM

## 2025-02-18 DIAGNOSIS — I10 PRIMARY HYPERTENSION: ICD-10-CM

## 2025-02-18 LAB
ALBUMIN SERPL-MCNC: 4.1 G/DL (ref 3.4–5)
ALBUMIN/GLOB SERPL: 2.3 {RATIO} (ref 1.1–2.2)
ALP SERPL-CCNC: 95 U/L (ref 40–129)
ALT SERPL-CCNC: 20 U/L (ref 10–40)
ANION GAP SERPL CALCULATED.3IONS-SCNC: 11 MMOL/L (ref 3–16)
AST SERPL-CCNC: 18 U/L (ref 15–37)
BILIRUB SERPL-MCNC: 0.5 MG/DL (ref 0–1)
BUN SERPL-MCNC: 27 MG/DL (ref 7–20)
CALCIUM SERPL-MCNC: 9.3 MG/DL (ref 8.3–10.6)
CHLORIDE SERPL-SCNC: 96 MMOL/L (ref 99–110)
CHOLEST SERPL-MCNC: 179 MG/DL (ref 0–199)
CO2 SERPL-SCNC: 28 MMOL/L (ref 21–32)
CREAT SERPL-MCNC: 1.4 MG/DL (ref 0.8–1.3)
GFR SERPLBLD CREATININE-BSD FMLA CKD-EPI: 52 ML/MIN/{1.73_M2}
GLUCOSE SERPL-MCNC: 98 MG/DL (ref 70–99)
HDLC SERPL-MCNC: 43 MG/DL (ref 40–60)
LDLC SERPL CALC-MCNC: ABNORMAL MG/DL
LDLC SERPL-MCNC: 85 MG/DL
POTASSIUM SERPL-SCNC: 3.9 MMOL/L (ref 3.5–5.1)
PROT SERPL-MCNC: 5.9 G/DL (ref 6.4–8.2)
PSA SERPL DL<=0.01 NG/ML-MCNC: 0.4 NG/ML (ref 0–4)
SODIUM SERPL-SCNC: 135 MMOL/L (ref 136–145)
TRIGL SERPL-MCNC: 302 MG/DL (ref 0–150)
VLDLC SERPL CALC-MCNC: ABNORMAL MG/DL

## 2025-02-18 PROCEDURE — 36415 COLL VENOUS BLD VENIPUNCTURE: CPT | Performed by: NURSE PRACTITIONER

## 2025-03-01 DIAGNOSIS — C61 PROSTATE CANCER (HCC): ICD-10-CM

## 2025-03-03 RX ORDER — FINASTERIDE 5 MG/1
5 TABLET, FILM COATED ORAL DAILY
Qty: 90 TABLET | Refills: 0 | OUTPATIENT
Start: 2025-03-03

## 2025-03-24 ENCOUNTER — OFFICE VISIT (OUTPATIENT)
Dept: FAMILY MEDICINE CLINIC | Age: 75
End: 2025-03-24
Payer: MEDICARE

## 2025-03-24 VITALS
SYSTOLIC BLOOD PRESSURE: 120 MMHG | HEART RATE: 60 BPM | OXYGEN SATURATION: 97 % | HEIGHT: 76 IN | BODY MASS INDEX: 31.05 KG/M2 | WEIGHT: 255 LBS | DIASTOLIC BLOOD PRESSURE: 80 MMHG

## 2025-03-24 DIAGNOSIS — J06.9 VIRAL URI: ICD-10-CM

## 2025-03-24 DIAGNOSIS — N17.9 AKI (ACUTE KIDNEY INJURY): Primary | ICD-10-CM

## 2025-03-24 LAB
ANION GAP SERPL CALCULATED.3IONS-SCNC: 17 MMOL/L (ref 3–16)
BUN SERPL-MCNC: 48 MG/DL (ref 7–20)
CALCIUM SERPL-MCNC: 9.9 MG/DL (ref 8.3–10.6)
CHLORIDE SERPL-SCNC: 90 MMOL/L (ref 99–110)
CO2 SERPL-SCNC: 28 MMOL/L (ref 21–32)
CREAT SERPL-MCNC: 3.1 MG/DL (ref 0.8–1.3)
GFR SERPLBLD CREATININE-BSD FMLA CKD-EPI: 20 ML/MIN/{1.73_M2}
GLUCOSE SERPL-MCNC: 76 MG/DL (ref 70–99)
MAGNESIUM SERPL-MCNC: 2.04 MG/DL (ref 1.8–2.4)
POTASSIUM SERPL-SCNC: 3.3 MMOL/L (ref 3.5–5.1)
SODIUM SERPL-SCNC: 135 MMOL/L (ref 136–145)

## 2025-03-24 PROCEDURE — 3074F SYST BP LT 130 MM HG: CPT | Performed by: NURSE PRACTITIONER

## 2025-03-24 PROCEDURE — 1036F TOBACCO NON-USER: CPT | Performed by: NURSE PRACTITIONER

## 2025-03-24 PROCEDURE — 1159F MED LIST DOCD IN RCRD: CPT | Performed by: NURSE PRACTITIONER

## 2025-03-24 PROCEDURE — G8427 DOCREV CUR MEDS BY ELIG CLIN: HCPCS | Performed by: NURSE PRACTITIONER

## 2025-03-24 PROCEDURE — 3079F DIAST BP 80-89 MM HG: CPT | Performed by: NURSE PRACTITIONER

## 2025-03-24 PROCEDURE — 1160F RVW MEDS BY RX/DR IN RCRD: CPT | Performed by: NURSE PRACTITIONER

## 2025-03-24 PROCEDURE — 1123F ACP DISCUSS/DSCN MKR DOCD: CPT | Performed by: NURSE PRACTITIONER

## 2025-03-24 PROCEDURE — 36415 COLL VENOUS BLD VENIPUNCTURE: CPT | Performed by: NURSE PRACTITIONER

## 2025-03-24 PROCEDURE — 99213 OFFICE O/P EST LOW 20 MIN: CPT | Performed by: NURSE PRACTITIONER

## 2025-03-24 PROCEDURE — G8417 CALC BMI ABV UP PARAM F/U: HCPCS | Performed by: NURSE PRACTITIONER

## 2025-03-24 ASSESSMENT — ENCOUNTER SYMPTOMS
ABDOMINAL DISTENTION: 0
CONSTIPATION: 0
SINUS PAIN: 0
COUGH: 1
ABDOMINAL PAIN: 0
SINUS PRESSURE: 0
DIARRHEA: 0
VOMITING: 0
NAUSEA: 0
WHEEZING: 0
SHORTNESS OF BREATH: 0
RHINORRHEA: 0

## 2025-03-24 NOTE — PROGRESS NOTES
Bernabe Agosto (:  1950) is a 75 y.o. male,Established patient, here for evaluation of the following chief complaint(s):  Cold Symptoms (cough,chills, diarrhea x 2 weeks , no appetite/go over test results on kidney//)      ASSESSMENT/PLAN:  Assessment & Plan  ALONZO (acute kidney injury)    Patient had ALONZO on last CMP.  Recheck today.  Checking magnesium as well given worsening muscle discomfort and tingling.  Adjustments based upon lab results.    Orders:    Basic Metabolic Panel; Future    Magnesium; Future    Viral URI    Symptoms improving on their own with time.  No additional intervention indicated.  Follow-up as needed.              Return if symptoms worsen or fail to improve.    SUBJECTIVE/OBJECTIVE:  HORACIO Kidd presents today for evaluation of illness as well as discussion of kidney results:    -For about a week was having upset stomach, lack of appetite, diarrhea, chills, and cough.  States that GI symptoms have alleviated.  Cough has been doing fine for past 24 hours.  Want to be evaluated to be safe.  No shortness of breath.    -Was found to have ALONZO with last blood work.  Was recommended to recheck in a month.  Since he is here today, was wondering if you be able to have this rechecked today.  Drinks plenty water throughout the day.  Takes furosemide every other day.  Takes hydrochlorothiazide daily.  Takes telmisartan daily.  Has noted some increase in tingling and muscle discomfort.  Now involving the left leg and right upper extremity along with the left upper extremity.    Review of Systems   Constitutional:  Negative for chills and fever.   HENT:  Negative for congestion, postnasal drip, rhinorrhea, sinus pressure and sinus pain.    Respiratory:  Positive for cough. Negative for shortness of breath and wheezing.    Cardiovascular:  Negative for chest pain, palpitations and leg swelling.   Gastrointestinal:  Negative for abdominal distention, abdominal pain, constipation, diarrhea, nausea and

## 2025-03-26 ENCOUNTER — RESULTS FOLLOW-UP (OUTPATIENT)
Dept: FAMILY MEDICINE CLINIC | Age: 75
End: 2025-03-26

## 2025-03-26 DIAGNOSIS — N17.9 AKI (ACUTE KIDNEY INJURY): Primary | ICD-10-CM

## 2025-03-27 ENCOUNTER — HOSPITAL ENCOUNTER (OUTPATIENT)
Dept: ULTRASOUND IMAGING | Age: 75
Discharge: HOME | End: 2025-03-27
Payer: MEDICARE

## 2025-03-27 DIAGNOSIS — N17.9 AKI (ACUTE KIDNEY INJURY): ICD-10-CM

## 2025-03-27 PROCEDURE — 76770 US EXAM ABDO BACK WALL COMP: CPT

## 2025-03-31 ENCOUNTER — RESULTS FOLLOW-UP (OUTPATIENT)
Dept: FAMILY MEDICINE CLINIC | Age: 75
End: 2025-03-31

## 2025-04-10 ENCOUNTER — PATIENT MESSAGE (OUTPATIENT)
Dept: FAMILY MEDICINE CLINIC | Age: 75
End: 2025-04-10

## 2025-05-14 DIAGNOSIS — M51.379 DEGENERATION OF LUMBAR OR LUMBOSACRAL INTERVERTEBRAL DISC: ICD-10-CM

## 2025-05-14 DIAGNOSIS — C61 PROSTATE CANCER (HCC): ICD-10-CM

## 2025-05-14 RX ORDER — TRAZODONE HYDROCHLORIDE 100 MG/1
TABLET ORAL
Qty: 270 TABLET | Refills: 1 | OUTPATIENT
Start: 2025-05-14

## 2025-05-14 RX ORDER — FINASTERIDE 5 MG/1
5 TABLET, FILM COATED ORAL DAILY
Qty: 90 TABLET | Refills: 0 | Status: SHIPPED | OUTPATIENT
Start: 2025-05-14

## 2025-06-15 DIAGNOSIS — I10 PRIMARY HYPERTENSION: ICD-10-CM

## 2025-06-15 DIAGNOSIS — K21.9 GASTROESOPHAGEAL REFLUX DISEASE, UNSPECIFIED WHETHER ESOPHAGITIS PRESENT: ICD-10-CM

## 2025-06-16 RX ORDER — OMEPRAZOLE 20 MG/1
CAPSULE, DELAYED RELEASE ORAL DAILY
Qty: 90 CAPSULE | Refills: 1 | OUTPATIENT
Start: 2025-06-16

## 2025-06-16 RX ORDER — HYDROCHLOROTHIAZIDE 25 MG/1
25 TABLET ORAL DAILY
Qty: 90 TABLET | Refills: 1 | OUTPATIENT
Start: 2025-06-16

## 2025-07-05 DIAGNOSIS — I10 PRIMARY HYPERTENSION: ICD-10-CM

## 2025-07-05 DIAGNOSIS — G62.9 PERIPHERAL POLYNEUROPATHY: ICD-10-CM

## 2025-07-05 DIAGNOSIS — K21.9 GASTROESOPHAGEAL REFLUX DISEASE, UNSPECIFIED WHETHER ESOPHAGITIS PRESENT: ICD-10-CM

## 2025-07-05 DIAGNOSIS — M51.379 DEGENERATION OF LUMBAR OR LUMBOSACRAL INTERVERTEBRAL DISC: ICD-10-CM

## 2025-07-07 RX ORDER — HYDROCHLOROTHIAZIDE 25 MG/1
25 TABLET ORAL DAILY
Qty: 90 TABLET | Refills: 0 | Status: SHIPPED | OUTPATIENT
Start: 2025-07-07

## 2025-07-07 RX ORDER — OMEPRAZOLE 20 MG/1
CAPSULE, DELAYED RELEASE ORAL DAILY
Qty: 90 CAPSULE | Refills: 0 | Status: SHIPPED | OUTPATIENT
Start: 2025-07-07

## 2025-07-07 RX ORDER — DULOXETIN HYDROCHLORIDE 60 MG/1
CAPSULE, DELAYED RELEASE ORAL DAILY
Qty: 90 CAPSULE | Refills: 0 | Status: SHIPPED | OUTPATIENT
Start: 2025-07-07

## 2025-08-09 DIAGNOSIS — C61 PROSTATE CANCER (HCC): ICD-10-CM

## 2025-08-11 RX ORDER — FINASTERIDE 5 MG/1
5 TABLET, FILM COATED ORAL DAILY
Qty: 90 TABLET | Refills: 0 | Status: SHIPPED | OUTPATIENT
Start: 2025-08-11

## 2025-08-16 DIAGNOSIS — E78.00 HYPERCHOLESTEREMIA: ICD-10-CM

## 2025-08-16 DIAGNOSIS — M51.379 DEGENERATION OF LUMBAR OR LUMBOSACRAL INTERVERTEBRAL DISC: ICD-10-CM

## 2025-08-16 DIAGNOSIS — I10 PRIMARY HYPERTENSION: ICD-10-CM

## 2025-08-18 RX ORDER — ATORVASTATIN CALCIUM 80 MG/1
80 TABLET, FILM COATED ORAL DAILY
Qty: 90 TABLET | Refills: 0 | Status: SHIPPED | OUTPATIENT
Start: 2025-08-18

## 2025-08-18 RX ORDER — TELMISARTAN 80 MG/1
80 TABLET ORAL DAILY
Qty: 90 TABLET | Refills: 0 | Status: SHIPPED | OUTPATIENT
Start: 2025-08-18

## 2025-08-22 DIAGNOSIS — N32.81 OAB (OVERACTIVE BLADDER): ICD-10-CM

## 2025-08-22 RX ORDER — OXYBUTYNIN CHLORIDE 10 MG/1
10 TABLET, EXTENDED RELEASE ORAL DAILY
Qty: 90 TABLET | Refills: 0 | Status: SHIPPED | OUTPATIENT
Start: 2025-08-22

## 2025-08-28 ENCOUNTER — OFFICE VISIT (OUTPATIENT)
Dept: FAMILY MEDICINE CLINIC | Age: 75
End: 2025-08-28
Payer: MEDICARE

## 2025-08-28 VITALS
HEIGHT: 76 IN | HEART RATE: 55 BPM | OXYGEN SATURATION: 97 % | SYSTOLIC BLOOD PRESSURE: 138 MMHG | BODY MASS INDEX: 33.17 KG/M2 | WEIGHT: 272.4 LBS | DIASTOLIC BLOOD PRESSURE: 90 MMHG

## 2025-08-28 DIAGNOSIS — K21.9 GASTROESOPHAGEAL REFLUX DISEASE, UNSPECIFIED WHETHER ESOPHAGITIS PRESENT: ICD-10-CM

## 2025-08-28 DIAGNOSIS — M51.372 DEGENERATION OF INTERVERTEBRAL DISC OF LUMBOSACRAL REGION WITH DISCOGENIC BACK PAIN AND LOWER EXTREMITY PAIN: ICD-10-CM

## 2025-08-28 DIAGNOSIS — C61 PROSTATE CANCER (HCC): ICD-10-CM

## 2025-08-28 DIAGNOSIS — N17.9 AKI (ACUTE KIDNEY INJURY): Primary | ICD-10-CM

## 2025-08-28 DIAGNOSIS — I10 PRIMARY HYPERTENSION: ICD-10-CM

## 2025-08-28 DIAGNOSIS — G62.9 PERIPHERAL POLYNEUROPATHY: ICD-10-CM

## 2025-08-28 LAB
ALBUMIN SERPL-MCNC: 4.2 G/DL (ref 3.4–5)
ALBUMIN/GLOB SERPL: 2.2 {RATIO} (ref 1.1–2.2)
ALP SERPL-CCNC: 87 U/L (ref 40–129)
ALT SERPL-CCNC: 20 U/L (ref 10–40)
ANION GAP SERPL CALCULATED.3IONS-SCNC: 12 MMOL/L (ref 3–16)
AST SERPL-CCNC: 17 U/L (ref 15–37)
BILIRUB SERPL-MCNC: 0.6 MG/DL (ref 0–1)
BUN SERPL-MCNC: 20 MG/DL (ref 7–20)
CALCIUM SERPL-MCNC: 9.4 MG/DL (ref 8.3–10.6)
CHLORIDE SERPL-SCNC: 98 MMOL/L (ref 99–110)
CO2 SERPL-SCNC: 28 MMOL/L (ref 21–32)
CREAT SERPL-MCNC: 1 MG/DL (ref 0.8–1.3)
GFR SERPLBLD CREATININE-BSD FMLA CKD-EPI: 78 ML/MIN/{1.73_M2}
GLUCOSE SERPL-MCNC: 94 MG/DL (ref 70–99)
POTASSIUM SERPL-SCNC: 4.1 MMOL/L (ref 3.5–5.1)
PROT SERPL-MCNC: 6.1 G/DL (ref 6.4–8.2)
SODIUM SERPL-SCNC: 138 MMOL/L (ref 136–145)

## 2025-08-28 PROCEDURE — G8417 CALC BMI ABV UP PARAM F/U: HCPCS | Performed by: NURSE PRACTITIONER

## 2025-08-28 PROCEDURE — 1160F RVW MEDS BY RX/DR IN RCRD: CPT | Performed by: NURSE PRACTITIONER

## 2025-08-28 PROCEDURE — 3075F SYST BP GE 130 - 139MM HG: CPT | Performed by: NURSE PRACTITIONER

## 2025-08-28 PROCEDURE — G8427 DOCREV CUR MEDS BY ELIG CLIN: HCPCS | Performed by: NURSE PRACTITIONER

## 2025-08-28 PROCEDURE — 36415 COLL VENOUS BLD VENIPUNCTURE: CPT | Performed by: NURSE PRACTITIONER

## 2025-08-28 PROCEDURE — G2211 COMPLEX E/M VISIT ADD ON: HCPCS | Performed by: NURSE PRACTITIONER

## 2025-08-28 PROCEDURE — 3080F DIAST BP >= 90 MM HG: CPT | Performed by: NURSE PRACTITIONER

## 2025-08-28 PROCEDURE — 1159F MED LIST DOCD IN RCRD: CPT | Performed by: NURSE PRACTITIONER

## 2025-08-28 PROCEDURE — 1123F ACP DISCUSS/DSCN MKR DOCD: CPT | Performed by: NURSE PRACTITIONER

## 2025-08-28 PROCEDURE — 1036F TOBACCO NON-USER: CPT | Performed by: NURSE PRACTITIONER

## 2025-08-28 PROCEDURE — 99214 OFFICE O/P EST MOD 30 MIN: CPT | Performed by: NURSE PRACTITIONER

## 2025-08-28 RX ORDER — PREGABALIN 75 MG/1
75 CAPSULE ORAL 2 TIMES DAILY
COMMUNITY
Start: 2025-08-11

## 2025-08-28 RX ORDER — AZELAIC ACID 0.15 G/G
AEROSOL, FOAM TOPICAL
COMMUNITY
Start: 2025-05-22

## 2025-08-28 RX ORDER — TAMSULOSIN HYDROCHLORIDE 0.4 MG/1
0.4 CAPSULE ORAL DAILY
Qty: 90 CAPSULE | Refills: 1 | Status: SHIPPED | OUTPATIENT
Start: 2025-08-28

## 2025-08-28 RX ORDER — OMEPRAZOLE 20 MG/1
20 CAPSULE, DELAYED RELEASE ORAL DAILY
Qty: 90 CAPSULE | Refills: 0 | Status: SHIPPED | OUTPATIENT
Start: 2025-08-28

## 2025-08-28 RX ORDER — DULOXETIN HYDROCHLORIDE 60 MG/1
60 CAPSULE, DELAYED RELEASE ORAL DAILY
Qty: 90 CAPSULE | Refills: 0 | Status: SHIPPED | OUTPATIENT
Start: 2025-08-28

## 2025-08-28 RX ORDER — HYDROCHLOROTHIAZIDE 25 MG/1
25 TABLET ORAL DAILY
Qty: 90 TABLET | Refills: 0 | Status: SHIPPED | OUTPATIENT
Start: 2025-08-28

## 2025-08-28 ASSESSMENT — ENCOUNTER SYMPTOMS
SINUS PAIN: 0
BACK PAIN: 1
NAUSEA: 0
EYE PAIN: 0
SORE THROAT: 0
SINUS PRESSURE: 0
WHEEZING: 0
EYE DISCHARGE: 0
COUGH: 0
ABDOMINAL PAIN: 0
SHORTNESS OF BREATH: 0
DIARRHEA: 0
ABDOMINAL DISTENTION: 0
VOMITING: 0
EYE REDNESS: 0